# Patient Record
Sex: MALE | Race: WHITE | Employment: OTHER | URBAN - METROPOLITAN AREA
[De-identification: names, ages, dates, MRNs, and addresses within clinical notes are randomized per-mention and may not be internally consistent; named-entity substitution may affect disease eponyms.]

---

## 2017-02-03 ENCOUNTER — HOSPITAL ENCOUNTER (OUTPATIENT)
Dept: VASCULAR SURGERY | Age: 47
Discharge: HOME | End: 2017-02-03
Attending: SPECIALIST
Payer: COMMERCIAL

## 2017-02-03 DIAGNOSIS — Z86.718 HISTORY OF DVT (DEEP VEIN THROMBOSIS): ICD-10-CM

## 2017-02-03 DIAGNOSIS — M79.89 LEG SWELLING: ICD-10-CM

## 2017-02-03 PROCEDURE — 93970 EXTREMITY STUDY: CPT

## 2017-02-03 NOTE — MR AVS SNAPSHOT
Visit Information Date & Time Provider Department Dept. Phone Encounter #  
 2/3/2017  9:00 AM [facility] [address] [id number] Upcoming Health Maintenance Date Due DTaP/Tdap/Td series (1 - Tdap) 5/18/1991 INFLUENZA AGE 9 TO ADULT 8/1/2016 Allergies as of 2/3/2017  Review Complete On: 12/8/2016 By: Cherry Gustafson RN No Known Allergies Current Immunizations  Never Reviewed Name Date Influenza Vaccine (Quad) PF 1/26/2016 12:10 PM  
  
 Not reviewed this visit You Were Diagnosed With   
  
 Codes Comments Leg swelling     ICD-10-CM: M79.89 ICD-9-CM: 729.81 History of DVT (deep vein thrombosis)     ICD-10-CM: T26.329 ICD-9-CM: V12.51 Vitals Smoking Status Never Smoker Your Updated Medication List  
  
ASK your doctor about these medications   
 amLODIPine 5 mg tablet Commonly known as:  Esha Harris Take 5 mg by mouth daily. lisinopril 40 mg tablet Commonly known as:  Ashia Olivarez Take 40 mg by mouth daily. PERCOCET  mg per tablet Generic drug:  oxyCODONE-acetaminophen Take 1 Tab by mouth every six (6) hours as needed for Pain.  
  
 phenylephrine 1 % Spry Commonly known as:  NEOSYNEPHRINE  
2 Sprays by Both Nostrils route every six (6) hours as needed. We Performed the Following DUPLEX LOWER EXT VENOUS BILAT [id number] CPT(R)] Comments:  
 R/o dvt Introducing [address]! Angel Almaraz introduces [employer] patient portal. Now you can access parts of your medical record, email your doctor's office, and request medication refills online. 1. In your internet browser, go to [email or web address] 2. Click on the First Time User? Click Here link in the Sign In box. You will see the New Member Sign Up page. 3. Enter your [employer] Access Code exactly as it appears below.  You will not need to use this code after youve completed the sign-up process. If you do not sign up before the expiration date, you must request a new code. · [employer] Access Code: [email or web address] Expires: 3/5/2017  2:52 PM 
 
4. Enter the last four digits of your Social Security Number (xxxx) and Date of Birth (mm/dd/yyyy) as indicated and click Submit. You will be taken to the next sign-up page. 5. Create a [employer] ID. This will be your [employer] login ID and cannot be changed, so think of one that is secure and easy to remember. 6. Create a [employer] password. You can change your password at any time. 7. Enter your Password Reset Question and Answer. This can be used at a later time if you forget your password. 8. Enter your e-mail address. You will receive e-mail notification when new information is available in [address]. 9. Click Sign Up. You can now view and download portions of your medical record. 10. Click the Download Summary menu link to download a portable copy of your medical information. If you have questions, please visit the Frequently Asked Questions section of the [employer] website. Remember, [employer] is NOT to be used for urgent needs. For medical emergencies, dial 911. Now available from your iPhone and Android! Please provide this summary of care documentation to your next provider. Your primary care clinician is listed as Michel Lawrence. If you have any questions after today's visit, please call [phone].

## 2017-02-03 NOTE — PROCEDURES
[facility], located within [facility]  *** FINAL REPORT ***    Name: Antonio Ghosh  MRN: [id number]    Outpatient  : 18 May 1970  HIS Order #: [id number]  [address] [facility] Visit #: [id number]  Date: 2017    TYPE OF TEST: Peripheral Venous Testing    REASON FOR TEST  Pain in limb    Right Leg:-  Deep venous thrombosis:           Yes  Proximal extent of thrombus:      Posterior Tibial  Superficial venous thrombosis:    No  Deep venous insufficiency:        Not examined  Superficial venous insufficiency: Not examined    Left Leg:-  Deep venous thrombosis:           Yes  Proximal extent of thrombus:      Posterior Tibial  Superficial venous thrombosis:    No  Deep venous insufficiency:        Not examined  Superficial venous insufficiency: Not examined      INTERPRETATION/FINDINGS  Duplex images were obtained using 2-D gray scale, color flow, and  spectral Doppler analysis. Right leg :  1. Deep vein(s) visualized include the common femoral, deep femoral,  proximal femoral, mid femoral, distal femoral, popliteal(above knee),  popliteal(fossa), popliteal(below knee), posterior tibial and peroneal   veins. 2. Non occlusive deep venous thrombosis identified in the mid  posterior tibial vein. 3. No evidence of superficial thrombosis detected. Left leg :  1. Deep vein(s) visualized include the common femoral, deep femoral,  proximal femoral, mid femoral, distal femoral, popliteal(above knee),  popliteal(fossa), popliteal(below knee), posterior tibial and peroneal   veins. 2. Non occlusive deep venous thrombosis identified in one of the  posterior tibial and peroneal veins. 3. No evidence of superficial thrombosis detected. ADDITIONAL COMMENTS  Verbal report given to Dr. Jeff Lebron have personally reviewed the data relevant to the interpretation of  this  study. TECHNOLOGIST: Frankie Breen  Signed: 2017 10:25 AM    PHYSICIAN: Merrill Black MD  Signed: 2017 01:08 PM

## 2023-02-01 ENCOUNTER — HOSPITAL ENCOUNTER (INPATIENT)
Age: 53
LOS: 8 days | Discharge: HOME HEALTH | DRG: 871 | End: 2023-02-09
Attending: EMERGENCY MEDICINE | Admitting: HOSPITALIST
Payer: COMMERCIAL

## 2023-02-01 ENCOUNTER — APPOINTMENT (OUTPATIENT)
Dept: CT IMAGING | Age: 53
DRG: 871 | End: 2023-02-01
Attending: EMERGENCY MEDICINE
Payer: COMMERCIAL

## 2023-02-01 ENCOUNTER — APPOINTMENT (OUTPATIENT)
Dept: GENERAL RADIOLOGY | Age: 53
DRG: 871 | End: 2023-02-01
Attending: EMERGENCY MEDICINE
Payer: COMMERCIAL

## 2023-02-01 DIAGNOSIS — N17.9 AKI (ACUTE KIDNEY INJURY) (HCC): ICD-10-CM

## 2023-02-01 DIAGNOSIS — E11.622 TYPE 2 DIABETES MELLITUS WITH OTHER SKIN ULCER, WITHOUT LONG-TERM CURRENT USE OF INSULIN (HCC): ICD-10-CM

## 2023-02-01 DIAGNOSIS — L03.115 CELLULITIS OF RIGHT LOWER EXTREMITY: Primary | ICD-10-CM

## 2023-02-01 DIAGNOSIS — I95.9 ACUTE HYPOTENSION: ICD-10-CM

## 2023-02-01 DIAGNOSIS — A41.9 SEPSIS, DUE TO UNSPECIFIED ORGANISM, UNSPECIFIED WHETHER ACUTE ORGAN DYSFUNCTION PRESENT (HCC): ICD-10-CM

## 2023-02-01 DIAGNOSIS — I95.9 HYPOTENSION, UNSPECIFIED HYPOTENSION TYPE: ICD-10-CM

## 2023-02-01 PROBLEM — L03.90 CELLULITIS: Status: ACTIVE | Noted: 2023-02-01

## 2023-02-01 PROBLEM — R65.21 SEPTIC SHOCK (HCC): Status: ACTIVE | Noted: 2023-02-01

## 2023-02-01 PROBLEM — G47.33 OSA (OBSTRUCTIVE SLEEP APNEA): Status: ACTIVE | Noted: 2023-02-01

## 2023-02-01 LAB
ALBUMIN SERPL-MCNC: 2.8 G/DL (ref 3.4–5)
ALBUMIN/GLOB SERPL: 0.6 (ref 0.8–1.7)
ALP SERPL-CCNC: 71 U/L (ref 45–117)
ALT SERPL-CCNC: 29 U/L (ref 16–61)
ANION GAP SERPL CALC-SCNC: 10 MMOL/L (ref 3–18)
AST SERPL-CCNC: 12 U/L (ref 10–38)
BASOPHILS # BLD: 0 K/UL (ref 0–0.1)
BASOPHILS NFR BLD: 0 % (ref 0–2)
BILIRUB SERPL-MCNC: 0.8 MG/DL (ref 0.2–1)
BUN SERPL-MCNC: 25 MG/DL (ref 7–18)
BUN/CREAT SERPL: 8 (ref 12–20)
CALCIUM SERPL-MCNC: 9.6 MG/DL (ref 8.5–10.1)
CHLORIDE SERPL-SCNC: 99 MMOL/L (ref 100–111)
CO2 SERPL-SCNC: 27 MMOL/L (ref 21–32)
CREAT SERPL-MCNC: 3.26 MG/DL (ref 0.6–1.3)
DIFFERENTIAL METHOD BLD: ABNORMAL
EOSINOPHIL # BLD: 0.3 K/UL (ref 0–0.4)
EOSINOPHIL NFR BLD: 1 % (ref 0–5)
ERYTHROCYTE [DISTWIDTH] IN BLOOD BY AUTOMATED COUNT: 13.7 % (ref 11.6–14.5)
GLOBULIN SER CALC-MCNC: 4.9 G/DL (ref 2–4)
GLUCOSE BLD STRIP.AUTO-MCNC: 176 MG/DL (ref 74–106)
GLUCOSE SERPL-MCNC: 184 MG/DL (ref 74–99)
HCT VFR BLD AUTO: 46 % (ref 36–48)
HGB BLD-MCNC: 14.8 G/DL (ref 13–16)
IMM GRANULOCYTES # BLD AUTO: 0.8 K/UL (ref 0–0.04)
IMM GRANULOCYTES NFR BLD AUTO: 3 % (ref 0–0.5)
LACTATE BLD-SCNC: 3.44 MMOL/L (ref 0.4–2)
LACTATE SERPL-SCNC: 3.3 MMOL/L (ref 0.4–2)
LYMPHOCYTES # BLD: 0.8 K/UL (ref 0.9–3.6)
LYMPHOCYTES NFR BLD: 3 % (ref 21–52)
MCH RBC QN AUTO: 27.9 PG (ref 24–34)
MCHC RBC AUTO-ENTMCNC: 32.2 G/DL (ref 31–37)
MCV RBC AUTO: 86.8 FL (ref 78–100)
MONOCYTES # BLD: 1.7 K/UL (ref 0.05–1.2)
MONOCYTES NFR BLD: 6 % (ref 3–10)
NEUTS SEG # BLD: 24.5 K/UL (ref 1.8–8)
NEUTS SEG NFR BLD: 87 % (ref 40–73)
NRBC # BLD: 0 K/UL (ref 0–0.01)
NRBC BLD-RTO: 0 PER 100 WBC
PLATELET # BLD AUTO: 245 K/UL (ref 135–420)
PLATELET COMMENTS,PCOM: ABNORMAL
PMV BLD AUTO: 10.6 FL (ref 9.2–11.8)
POTASSIUM SERPL-SCNC: 4.1 MMOL/L (ref 3.5–5.5)
PROT SERPL-MCNC: 7.7 G/DL (ref 6.4–8.2)
RBC # BLD AUTO: 5.3 M/UL (ref 4.35–5.65)
RBC MORPH BLD: ABNORMAL
RBC MORPH BLD: ABNORMAL
RPR SER QL: NONREACTIVE
SERVICE CMNT-IMP: ABNORMAL
SODIUM SERPL-SCNC: 136 MMOL/L (ref 136–145)
TROPONIN I SERPL HS-MCNC: 16 NG/L (ref 0–78)
WBC # BLD AUTO: 28.1 K/UL (ref 4.6–13.2)
WBC MORPH BLD: ABNORMAL

## 2023-02-01 PROCEDURE — P9047 ALBUMIN (HUMAN), 25%, 50ML: HCPCS | Performed by: HOSPITALIST

## 2023-02-01 PROCEDURE — 96365 THER/PROPH/DIAG IV INF INIT: CPT

## 2023-02-01 PROCEDURE — 87205 SMEAR GRAM STAIN: CPT

## 2023-02-01 PROCEDURE — 87040 BLOOD CULTURE FOR BACTERIA: CPT

## 2023-02-01 PROCEDURE — 74011000250 HC RX REV CODE- 250: Performed by: FAMILY MEDICINE

## 2023-02-01 PROCEDURE — 83605 ASSAY OF LACTIC ACID: CPT

## 2023-02-01 PROCEDURE — 74011250636 HC RX REV CODE- 250/636: Performed by: EMERGENCY MEDICINE

## 2023-02-01 PROCEDURE — 86592 SYPHILIS TEST NON-TREP QUAL: CPT

## 2023-02-01 PROCEDURE — 96375 TX/PRO/DX INJ NEW DRUG ADDON: CPT

## 2023-02-01 PROCEDURE — 71045 X-RAY EXAM CHEST 1 VIEW: CPT

## 2023-02-01 PROCEDURE — 65610000006 HC RM INTENSIVE CARE

## 2023-02-01 PROCEDURE — 85025 COMPLETE CBC W/AUTO DIFF WBC: CPT

## 2023-02-01 PROCEDURE — 93005 ELECTROCARDIOGRAM TRACING: CPT

## 2023-02-01 PROCEDURE — 87147 CULTURE TYPE IMMUNOLOGIC: CPT

## 2023-02-01 PROCEDURE — 84484 ASSAY OF TROPONIN QUANT: CPT

## 2023-02-01 PROCEDURE — 73701 CT LOWER EXTREMITY W/DYE: CPT

## 2023-02-01 PROCEDURE — 80053 COMPREHEN METABOLIC PANEL: CPT

## 2023-02-01 PROCEDURE — 82962 GLUCOSE BLOOD TEST: CPT

## 2023-02-01 PROCEDURE — 74011000636 HC RX REV CODE- 636: Performed by: EMERGENCY MEDICINE

## 2023-02-01 PROCEDURE — 74011000258 HC RX REV CODE- 258: Performed by: EMERGENCY MEDICINE

## 2023-02-01 PROCEDURE — 74011250636 HC RX REV CODE- 250/636: Performed by: HOSPITALIST

## 2023-02-01 PROCEDURE — 99285 EMERGENCY DEPT VISIT HI MDM: CPT

## 2023-02-01 RX ORDER — HEPARIN SODIUM 5000 [USP'U]/ML
5000 INJECTION, SOLUTION INTRAVENOUS; SUBCUTANEOUS EVERY 8 HOURS
Status: DISCONTINUED | OUTPATIENT
Start: 2023-02-01 | End: 2023-02-09 | Stop reason: HOSPADM

## 2023-02-01 RX ORDER — NOREPINEPHRINE BIT/0.9 % NACL 16MG/250ML
.5-3 INFUSION BOTTLE (ML) INTRAVENOUS
Status: DISCONTINUED | OUTPATIENT
Start: 2023-02-01 | End: 2023-02-03

## 2023-02-01 RX ORDER — METFORMIN HYDROCHLORIDE 500 MG/1
500 TABLET, EXTENDED RELEASE ORAL
COMMUNITY

## 2023-02-01 RX ORDER — METOPROLOL TARTRATE 100 MG/1
100 TABLET ORAL 2 TIMES DAILY
COMMUNITY

## 2023-02-01 RX ORDER — BUPROPION HYDROCHLORIDE 300 MG/1
300 TABLET ORAL DAILY
COMMUNITY

## 2023-02-01 RX ORDER — SODIUM CHLORIDE 0.9 % (FLUSH) 0.9 %
5-10 SYRINGE (ML) INJECTION AS NEEDED
Status: DISCONTINUED | OUTPATIENT
Start: 2023-02-01 | End: 2023-02-09 | Stop reason: HOSPADM

## 2023-02-01 RX ORDER — SODIUM CHLORIDE 9 MG/ML
125 INJECTION, SOLUTION INTRAVENOUS CONTINUOUS
Status: DISCONTINUED | OUTPATIENT
Start: 2023-02-01 | End: 2023-02-03

## 2023-02-01 RX ORDER — OXYCODONE AND ACETAMINOPHEN 5; 325 MG/1; MG/1
1 TABLET ORAL
Status: DISCONTINUED | OUTPATIENT
Start: 2023-02-01 | End: 2023-02-02

## 2023-02-01 RX ORDER — VERAPAMIL HYDROCHLORIDE 120 MG/1
120 TABLET, FILM COATED ORAL 3 TIMES DAILY
COMMUNITY

## 2023-02-01 RX ORDER — PANTOPRAZOLE SODIUM 40 MG/1
40 TABLET, DELAYED RELEASE ORAL
Status: DISCONTINUED | OUTPATIENT
Start: 2023-02-02 | End: 2023-02-09 | Stop reason: HOSPADM

## 2023-02-01 RX ORDER — EZETIMIBE 10 MG/1
10 TABLET ORAL DAILY
COMMUNITY

## 2023-02-01 RX ORDER — LATANOPROST 50 UG/ML
1 SOLUTION/ DROPS OPHTHALMIC
COMMUNITY

## 2023-02-01 RX ORDER — VANCOMYCIN 2 GRAM/500 ML IN 0.9 % SODIUM CHLORIDE INTRAVENOUS
2000 ONCE
Status: COMPLETED | OUTPATIENT
Start: 2023-02-01 | End: 2023-02-01

## 2023-02-01 RX ORDER — CLINDAMYCIN HYDROCHLORIDE 300 MG/1
300 CAPSULE ORAL 3 TIMES DAILY
COMMUNITY
End: 2023-02-09

## 2023-02-01 RX ORDER — MORPHINE SULFATE 4 MG/ML
4 INJECTION INTRAVENOUS ONCE
Status: COMPLETED | OUTPATIENT
Start: 2023-02-01 | End: 2023-02-01

## 2023-02-01 RX ORDER — ROSUVASTATIN CALCIUM 40 MG/1
40 TABLET, COATED ORAL
COMMUNITY

## 2023-02-01 RX ORDER — ALBUMIN HUMAN 250 G/1000ML
25 SOLUTION INTRAVENOUS EVERY 6 HOURS
Status: COMPLETED | OUTPATIENT
Start: 2023-02-01 | End: 2023-02-03

## 2023-02-01 RX ADMIN — HEPARIN SODIUM 5000 UNITS: 5000 INJECTION INTRAVENOUS; SUBCUTANEOUS at 20:11

## 2023-02-01 RX ADMIN — SODIUM CHLORIDE 1000 ML: 9 INJECTION, SOLUTION INTRAVENOUS at 15:15

## 2023-02-01 RX ADMIN — SODIUM CHLORIDE 125 ML/HR: 9 INJECTION, SOLUTION INTRAVENOUS at 20:11

## 2023-02-01 RX ADMIN — Medication 4 MCG/MIN: at 20:33

## 2023-02-01 RX ADMIN — IOPAMIDOL 100 ML: 612 INJECTION, SOLUTION INTRAVENOUS at 16:59

## 2023-02-01 RX ADMIN — ALBUMIN (HUMAN) 25 G: 0.25 INJECTION, SOLUTION INTRAVENOUS at 23:41

## 2023-02-01 RX ADMIN — SODIUM CHLORIDE 1000 ML: 9 INJECTION, SOLUTION INTRAVENOUS at 15:14

## 2023-02-01 RX ADMIN — MORPHINE SULFATE 4 MG: 4 INJECTION INTRAVENOUS at 16:40

## 2023-02-01 RX ADMIN — CEFTRIAXONE 1 G: 1 INJECTION, POWDER, FOR SOLUTION INTRAMUSCULAR; INTRAVENOUS at 15:23

## 2023-02-01 RX ADMIN — SODIUM CHLORIDE 121 ML: 9 INJECTION, SOLUTION INTRAVENOUS at 15:15

## 2023-02-01 RX ADMIN — ALBUMIN (HUMAN) 25 G: 0.25 INJECTION, SOLUTION INTRAVENOUS at 20:11

## 2023-02-01 RX ADMIN — VANCOMYCIN HYDROCHLORIDE 2000 MG: 10 INJECTION, POWDER, LYOPHILIZED, FOR SOLUTION INTRAVENOUS at 15:24

## 2023-02-01 NOTE — ED TRIAGE NOTES
Patient wheelchaired to ED states that he was sent by his primary care provider. Patient states he has an abscess to the right hip that he believed is infected.

## 2023-02-01 NOTE — PROGRESS NOTES
[address] [facility] [place], [place] Pharmacokinetic Monitoring Service - Vancomycin     Jannette Benavidez is a 46 y.o. male starting on vancomycin therapy for SSTI. Pharmacy consulted by [facility] for monitoring and adjustment. Target Concentration: Goal AUC/TIMOTHY <500 mg*hr/L    Additional Antimicrobials: Ceftriaxone    Pertinent Laboratory Values: Wt Readings from Last 1 Encounters:   02/01/23 149.7 kg (330 lb)     Temp Readings from Last 1 Encounters:   02/01/23 98.1 °F (36.7 °C)     No components found for: PROCAL  Estimated Creatinine Clearance: 38.4 mL/min (A) (based on SCr of 3.26 mg/dL (H)).   Recent Labs     02/01/23  1500   WBC 28.1*     Plan:  Dosing recommendations based on Bayesian software  Start vancomycin 2 g IV x once 2/1/23 1500 then 750 mg IV q24h 2/2/23 1500  Anticipated AUC of 489 mg/L.hr and trough concentration of 15.3 mcg/ml at steady state  Renal labs as indicated   Vancomycin concentration ordered for 2/2/23 @ 0400   Pharmacy will continue to monitor patient and adjust therapy as indicated    Thank you for the consult,  BOWEN Waldrop  2/1/2023 4:32 PM

## 2023-02-01 NOTE — ED PROVIDER NOTES
EMERGENCY DEPARTMENT HISTORY AND PHYSICAL EXAM      Patient Name: Michaela Sosa  MRN: [id number]  YOB: 1970  Provider: Edward Lou MD  PCP: Steff Reed MD   Time/Date of evaluation: 2:47 PM 2/1/23      History of Presenting Illness     Chief Complaint   Patient presents with    Skin Problem       History Provided By: Patient and family      History (Narative):   Michaela Sosa is a 46 y.o. male with a PMHx of diabetes, chronic lumbar pain, sleep apnea, arthritis  who presents to the emergency department (room 6) by POV C/O skin wound/abscess. Patient feels that the abscess/open wound has been slowly improving over the last 3 weeks, however today he is having more difficulty ambulation due to increased swelling and pain. Associated sxs include leg pain. Pt denies any other sxs or complaints. Past History     Past Medical History:  Past Medical History:   Diagnosis Date    Arthritis     spine    Chronic pain     lumbar    GERD (gastroesophageal reflux disease)     Sleep apnea     CPAP in the past, was 300 lbs, lost weight       Past Surgical History:  Past Surgical History:   Procedure Laterality Date    HX ORTHOPAEDIC  1990    right ankle surgery       Family History:  No family history on file. Social History:  Social History     Tobacco Use    Smoking status: Never    Smokeless tobacco: Never   Substance Use Topics    Alcohol use:  Yes     Alcohol/week: 5.0 standard drinks     Types: 5 Shots of liquor per week    Drug use: No       Medications:  Current Facility-Administered Medications   Medication Dose Route Frequency Provider Last Rate Last Admin    sodium chloride (NS) flush 5-10 mL  5-10 mL IntraVENous PRN Belinda Erwin MD        [START ON 2/2/2023] Vancomycin level due with AM labs 2/2/23 0400  1 Each Other ONCE Jackie Crump MD        Vanocmycin - Rx to dose and monitor  1 Each Other Rx Dosing/Monitoring Jackie Crump MD        [START ON 2/2/2023] vancomycin (VANCOCIN) 750 mg in 0.9% sodium chloride 250 mL (VIAL-MATE)  750 mg IntraVENous Q24H Henri Erwin MD        [START ON 2/2/2023] cefTRIAXone (ROCEPHIN) 2 g in 0.9% sodium chloride (MBP/ADV) 50 mL MBP  2 g IntraVENous Q24H Andrea Carias MD        albumin human 25% (BUMINATE) solution 25 g  25 g IntraVENous Q6H Andrea Carias MD   25 g at 02/01/23 2011    0.9% sodium chloride infusion  125 mL/hr IntraVENous CONTINUOUS Sheila SEBASTIAN  mL/hr at 02/01/23 2011 125 mL/hr at 02/01/23 2011    heparin (porcine) injection 5,000 Units  5,000 Units SubCUTAneous Q8H Sheila SEBASTIAN MD   5,000 Units at 02/01/23 2011    [START ON 2/2/2023] pantoprazole (PROTONIX) tablet 40 mg  40 mg Oral ACB Sheila SEBASTIAN MD        NOREPINephrine (LEVOPHED) 16 mg in 0.9% sodium chloride 250 mL infusion  0.5-30 mcg/min IntraVENous TITRATE Zeinab Laguna MD 4.7 mL/hr at 02/01/23 2147 5 mcg/min at 02/01/23 2147    oxyCODONE-acetaminophen (PERCOCET) 5-325 mg per tablet 1 Tablet  1 Tablet Oral Q4H PRN Zeinab Laguna MD           Allergies:  No Known Allergies    Social Determinants of Health:  Social Determinants of Health     Tobacco Use: Not on file   Alcohol Use: Not on file   Financial Resource Strain: Not on file   Food Insecurity: Not on file   Transportation Needs: Not on file   Physical Activity: Not on file   Stress: Not on file   Social Connections: Not on file   Intimate Partner Violence: Not on file   Depression: Not on file   Housing Stability: Not on file       Review of Systems      Review of Systems   Skin:  Positive for color change and wound. Physical Exam     Vitals:    02/01/23 2001 02/01/23 2004 02/01/23 2015 02/01/23 2019   BP: (!) 143/109 (!) 132/99 (!) 78/52 (!) 88/43   Pulse: 69 67 68 66   Resp: 18 19 21 20   Temp:  98.1 °F (36.7 °C)     SpO2: 93% 98% 95% 99%   Weight:       Height:           Physical Exam  Vitals and nursing note reviewed.    Constitutional:       General: He is not in acute distress. Appearance: Normal appearance. He is normal weight. He is not ill-appearing. HENT:      Head: Normocephalic and atraumatic. Nose: Nose normal. No rhinorrhea. Mouth/Throat:      Mouth: Mucous membranes are moist.      Pharynx: No oropharyngeal exudate or posterior oropharyngeal erythema. Eyes:      Extraocular Movements: Extraocular movements intact. Conjunctiva/sclera: Conjunctivae normal.      Pupils: Pupils are equal, round, and reactive to light. Cardiovascular:      Rate and Rhythm: Regular rhythm. Bradycardia present. Heart sounds: No murmur heard. No friction rub. No gallop. Pulmonary:      Effort: Pulmonary effort is normal. No respiratory distress. Breath sounds: Normal breath sounds. No wheezing, rhonchi or rales. Abdominal:      General: Bowel sounds are normal.      Palpations: Abdomen is soft. Tenderness: There is no abdominal tenderness. There is no guarding or rebound. Musculoskeletal:         General: No swelling, tenderness or deformity. Normal range of motion. Cervical back: Normal range of motion and neck supple. No rigidity. Lymphadenopathy:      Cervical: No cervical adenopathy. Skin:     General: Skin is warm and dry. Findings: Abscess and erythema present. No rash. Neurological:      General: No focal deficit present. Mental Status: He is alert and oriented to person, place, and time.    Psychiatric:         Mood and Affect: Mood normal.         Behavior: Behavior normal.       Diagnostic Study Results     Labs:  Recent Results (from the past 12 hour(s))   TROPONIN-HIGH SENSITIVITY    Collection Time: 02/01/23  3:00 PM   Result Value Ref Range    Troponin-High Sensitivity 16 0 - 78 ng/L   METABOLIC PANEL, COMPREHENSIVE    Collection Time: 02/01/23  3:00 PM   Result Value Ref Range    Sodium 136 136 - 145 mmol/L    Potassium 4.1 3.5 - 5.5 mmol/L    Chloride 99 (L) 100 - 111 mmol/L    CO2 27 21 - 32 mmol/L    Anion gap 10 3.0 - 18 mmol/L    Glucose 184 (H) 74 - 99 mg/dL    BUN 25 (H) 7.0 - 18 MG/DL    Creatinine 3.26 (H) 0.6 - 1.3 MG/DL    BUN/Creatinine ratio 8 (L) 12 - 20      eGFR 22 (L) >60 ml/min/1.73m2    Calcium 9.6 8.5 - 10.1 MG/DL    Bilirubin, total 0.8 0.2 - 1.0 MG/DL    ALT (SGPT) 29 16 - 61 U/L    AST (SGOT) 12 10 - 38 U/L    Alk. phosphatase 71 45 - 117 U/L    Protein, total 7.7 6.4 - 8.2 g/dL    Albumin 2.8 (L) 3.4 - 5.0 g/dL    Globulin 4.9 (H) 2.0 - 4.0 g/dL    A-G Ratio 0.6 (L) 0.8 - 1.7     CBC WITH AUTOMATED DIFF    Collection Time: 02/01/23  3:00 PM   Result Value Ref Range    WBC 28.1 (H) 4.6 - 13.2 K/uL    RBC 5.30 4.35 - 5.65 M/uL    HGB 14.8 13.0 - 16.0 g/dL    HCT 46.0 36.0 - 48.0 %    MCV 86.8 78.0 - 100.0 FL    MCH 27.9 24.0 - 34.0 PG    MCHC 32.2 31.0 - 37.0 g/dL    RDW 13.7 11.6 - 14.5 %    PLATELET 376 361 - 654 K/uL    MPV 10.6 9.2 - 11.8 FL    NRBC 0.0 0  WBC    ABSOLUTE NRBC 0.00 0.00 - 0.01 K/uL    NEUTROPHILS 87 (H) 40 - 73 %    LYMPHOCYTES 3 (L) 21 - 52 %    MONOCYTES 6 3 - 10 %    EOSINOPHILS 1 0 - 5 %    BASOPHILS 0 0 - 2 %    IMMATURE GRANULOCYTES 3 (H) 0.0 - 0.5 %    ABS. NEUTROPHILS 24.5 (H) 1.8 - 8.0 K/UL    ABS. LYMPHOCYTES 0.8 (L) 0.9 - 3.6 K/UL    ABS. MONOCYTES 1.7 (H) 0.05 - 1.2 K/UL    ABS. EOSINOPHILS 0.3 0.0 - 0.4 K/UL    ABS. BASOPHILS 0.0 0.0 - 0.1 K/UL    ABS. IMM.  GRANS. 0.8 (H) 0.00 - 0.04 K/UL    DF AUTOMATED      PLATELET COMMENTS ADEQUATE PLATELETS      RBC COMMENTS POLYCHROMASIA  1+        RBC COMMENTS        OVALOCYTES  FEW  SCHISTOCYTES  FEW  ODILIA CELLS  FEW      WBC COMMENTS VACUOLATED POLYS     LACTIC ACID    Collection Time: 02/01/23  3:00 PM   Result Value Ref Range    Lactic acid 3.3 (HH) 0.4 - 2.0 MMOL/L   RPR    Collection Time: 02/01/23  3:00 PM   Result Value Ref Range    RPR NONREACTIVE NR     POC LACTIC ACID    Collection Time: 02/01/23  3:01 PM   Result Value Ref Range    Lactic Acid (POC) 3.44 (HH) 0.40 - 2.00 mmol/L   EKG, 12 LEAD, INITIAL    Collection Time: 02/01/23  3:21 PM   Result Value Ref Range    Ventricular Rate 56 BPM    QRS Duration 86 ms    Q-T Interval 404 ms    QTC Calculation (Bezet) 389 ms    Calculated R Axis 25 degrees    Calculated T Axis 11 degrees    Diagnosis       Junctional rhythm with retrograde conduction  Abnormal ECG  When compared with ECG of 20-JAN-2016 10:15,  Junctional rhythm has replaced Sinus rhythm  T wave inversion now evident in Inferior leads  Nonspecific T wave abnormality now evident in Anterior leads     GLUCOSE, POC    Collection Time: 02/01/23  6:47 PM   Result Value Ref Range    Glucose,  (H) 74 - 106 MG/DL    Performed by Meredith Cruz        Radiologic Studies:   CT HIP RT W CONT   Final Result      Right thigh cellulitis. Ill-defined phlegmon in the proximal, anterior right   thigh involves the right sartorius muscle. No drainable abscess at this time. No   fracture or osteomyelitis. XR CHEST PORT   Final Result   Cardiomegaly. No focal pulmonary process. DUPLEX LOWER EXT VENOUS RIGHT    (Results Pending)   US ABD COMP    (Results Pending)     CT Results  (Last 48 hours)                 02/01/23 1710  CT HIP RT W CONT Final result    Impression:      Right thigh cellulitis. Ill-defined phlegmon in the proximal, anterior right   thigh involves the right sartorius muscle. No drainable abscess at this time. No   fracture or osteomyelitis. Narrative:  EXAM: CT HIP RT W CONT       INDICATION: Right hip swelling, possible abscess. COMPARISON: None       TECHNIQUE: Helical CT of the right hip with coronal and sagittal reformats. Images reviewed in soft tissue and bone windows. CT dose reduction was achieved   through the use of a standardized protocol tailored for this examination and   automatic exposure control for dose modulation. CONTRAST: Post IV contrast 100 mL Isovue-300       FINDINGS: Bones: Normal bone mineralization.  No acute fracture, dislocation,   suspicious bone lesion, or osteomyelitis. Hardware: Lumbar spinal hardware and postsurgical findings are partially imaged. Severe right foraminal stenosis at L5-S1. Joint fluid: None. Articulations: Mild right hip osteoarthritis. No evidence of septic arthritis. Tendons: No full-thickness tendon tear. Muscles: Mild atrophy of the right gluteus cheryl muscle. Inflammation involves   the right sartorius muscle. Soft tissue mass: No mass. Multiple reactive right inguinal lymph nodes. No   drainable fluid collection. Skin thickening and stranding in the subcutaneous   adipose tissues involve the right sartorius muscle. Heterogeneous increased   attenuation in the fat lateral to the sartorius muscle measures 7.9 x 8.3 x 4.0   cm. CXR Results  (Last 48 hours)                 02/01/23 1646  XR CHEST PORT Final result    Impression:  Cardiomegaly. No focal pulmonary process. Narrative:  INDICATION:  meets SIRS criteria       COMPARISON: None       FINDINGS: Single AP portable view of the chest obtained at 1627 demonstrates an   enlarged cardiac silhouette. The lungs are hypoinspiratory but clear   bilaterally. No osseous abnormalities are seen. Procedures     Procedures    ED Course     2:47 PM I Kaushal Hu MD) am the first provider for this patient. Initial assessment performed. I reviewed the vital signs, available nursing notes, past medical history, past surgical history, family history and social history. The patients presenting problems have been discussed, and they are in agreement with the care plan formulated and outlined with them. I have encouraged them to ask questions as they arise throughout their visit.     Records Reviewed: Nursing Notes    Cardiac Monitor:  Rate: 58 bpm  Rhythm: Bradycardic Rhythm    Pulse Oximetry Analysis - 91% on RA    Is this patient to be included in the SEP-1 core measure due to severe sepsis or septic shock? YesSEP-1 CORE MEASURE DATA      Sepsis Criteria   Severe Sepsis Criteria   Septic Shock Criteria       Must meet 2:    []Temp >100.9 F (38.3 C) or < 96.8 F (36 C)  []HR > 90  []RR > 20  [x]WBC > 12 or < 4 or 10% bands    AND:    [x] Infection Confirmed or Suspected. Must meet 1:    [x]Lactate > 2       or   [x]Signs of Organ Dysfunction:    - SBP < 90 or MAP < 65  -Creatinine > 2 or increased from baseline  -Urine Output < 0.5 ml/kg/hr  -Bilirubin > 2  -INR > 1.5 (not anticoagulated)  -Platelets < 842,761  -Acute Respiratory Failure as evidenced by new need for NIPPV or mechanical ventilation   Must meet 1:    []Lactate > 4        or   [x]SBP < 90 or MAP < 65 for at least two readings in the first hour after fluid bolus administration    []Vasopressors initiated (if hypotension persists after fluid resuscitation)   Patient Vitals for the past 6 hrs:   Level of Consciousness Temp Temp Source Pulse Resp BP MAP (Monitor) MAP (Calculated) SpO2   02/01/23 1438 0 -- -- (!) 58 18 (!) 77/64 -- 68 100 %   02/01/23 1457 -- -- -- -- -- (!) 100/90 95 93 99 %   02/01/23 1459 -- 98.1 °F (36.7 °C) Oral -- -- -- -- -- --   02/01/23 1502 -- -- -- -- -- 94/74 82 81 91 %      No results for input(s): WBC, LACT, INR, PLT, INREXT, PLTEXT in the last 72 hours. No lab exists for component: CREATININE, BILITOT     Date/Time Sepsis Identified: 3:05 PM, 1 February 2023    Fluid Resuscitation Rationale: at least 30mL/kg based on ideal body weight due to obesity defined as BMI >30 (patient's BMI is Body mass index is 48.73 kg/m².  and IBW is Ideal body weight: 70.7 kg (155 lb 13.8 oz)  Adjusted ideal body weight: 102.3 kg (225 lb 8.3 oz))    Repeat lactate level: ordered and pending at this time    Reassessment Exam: I have reassessed tissue perfusion and hemodynamic status after fluid bolus at this date/time: 6:55 PM on 1 February 2023    [address] [facility] ED:  Medications   sodium chloride (NS) flush 5-10 mL (has no administration in time range)   Vancomycin level due with AM labs 2/2/23 0400 (has no administration in time range)   Vanocmycin - Rx to dose and monitor (has no administration in time range)   vancomycin (VANCOCIN) 750 mg in 0.9% sodium chloride 250 mL (VIAL-MATE) (has no administration in time range)   cefTRIAXone (ROCEPHIN) 2 g in 0.9% sodium chloride (MBP/ADV) 50 mL MBP (has no administration in time range)   albumin human 25% (BUMINATE) solution 25 g (25 g IntraVENous New Bag 2/1/23 2011)   0.9% sodium chloride infusion (125 mL/hr IntraVENous New Bag 2/1/23 2011)   heparin (porcine) injection 5,000 Units (5,000 Units SubCUTAneous Given 2/1/23 2011)   pantoprazole (PROTONIX) tablet 40 mg (has no administration in time range)   NOREPINephrine (LEVOPHED) 16 mg in 0.9% sodium chloride 250 mL infusion (5 mcg/min IntraVENous Rate Change 2/1/23 2147)   oxyCODONE-acetaminophen (PERCOCET) 5-325 mg per tablet 1 Tablet (has no administration in time range)   sodium chloride 0.9 % bolus infusion 1,000 mL (1,000 mL IntraVENous New Bag 2/1/23 1514)     Followed by   sodium chloride 0.9 % bolus infusion 1,000 mL (1,000 mL IntraVENous New Bag 2/1/23 1515)     Followed by   sodium chloride 0.9 % bolus infusion 121 mL (121 mL IntraVENous New Bag 2/1/23 1515)   vancomycin (VANCOCIN) 2000 mg in  ml infusion (2,000 mg IntraVENous New Bag 2/1/23 1524)   morphine injection 4 mg (4 mg IntraVENous Given 2/1/23 1640)   iopamidoL (ISOVUE 300) 300 mg iodine /mL (61 %) contrast injection 100 mL (100 mL IntraVENous Given 2/1/23 1659)       ED Course as of 02/01/23 2205 Wed Feb 01, 2023   1521 EKG interpretation:  Rhythm: Junctional rhythm. Rate: 56 bpm; no STEMI  EKG read by Reid Clifton MD   [JM]   6817 CONSULT NOTE:   Reid Clifton MD spoke with Dr. Giselle Lopez, intensivist who agrees with admission to ICU. [JM]   0088 CONSULT NOTE:   Reid Clifton MD spoke with Dr. Aga Gonzalez for ICU admission.   He requests duplex of the RLE. [JM]      ED Course User Index  [JM] Elenore Hammans, MD       Medical Decision Making     DDX: Cellulitis, abscess, sepsis    DISCUSSION:  This appears to be a severe conditon. This appears to be an acute condition. 46 y.o. male with 3 weeks of open sore on right thigh which seems to be improving however over the last several days he has had increased pain and induration of the skin consistent with a cellulitis. In evaluation of the above differential diagnosis, consideration was given to the following tests and treatments. Patient arrived and having concern for sepsis based on hypotension and suspected source of infection with right hip cellulitis. Sepsis protocol was immediately ordered including 30 bag per cake fluid bolus based on ideal body weight due to elevated BMI. Antibiotics were ordered immediately for skin and soft tissue infection pathway. Repeat lactic acid was ordered dizziness initial lactic acid was elevated. Initial glucose was collected and was moderately elevated at 176. Troponin was within normal limits. Creatinine was elevated at 3.26 but it is unknown whether this patient's last creatinine was, although this likely represents an VIKTOR as her distinguished if he has had a history of renal failure in the past.  White blood cell count was elevated markedly to 28,000 with left shift 87% which confirms suspicion of sepsis. CT scan of his hip was ordered and showed a right thigh cellulitis without drainable fluid collection. A culture of the wound was also sent. Patient had some penile lesions which did not seem to be concerning to him but RPR was sent to evaluate for potential syphilis. Chest x-ray was ordered and did not demonstrate any acute process. Due to the patient's persistent hypotension decision was made to admit him to the ICU as he is likely not suitable for telemetry floor.   Discussed with the hospitalist and the intensivist.  Midline was placed in the ED. Pressors were not started in the ED as the patient's MAP was greater than 65. The decision to perform testing and results were discussed with the patient and family. I discussed each of these tests and considerations with the patient and family. The patient and family agrees with the plan of admission. Additional Considerations: The following Chronic Conditions impacted the patient's care: Diabetes because high risk for infection or severe infection. Critical Care Time:     I have spent 99 minutes of critical care time involved in lab review, consultations with specialist, family decision-making, and documentation. This time does not include time spent on separately billable procedures. During this entire length of time, I was immediately available to the patient. Critical Care: The reason for providing this level of medical care for this critically ill patient was due a critical illness that impaired one or more vital organ systems such that there was a high probability of imminent or life-threatening deterioration in the patient's condition. This care involved high complexity decision making to assess, manipulate, and support vital system functions, to treat this degreee vital organ system failure and to prevent further life-threatening deterioration of the patients condition. Dirk Robert MD    Diagnosis and Disposition     Critical Care Time: 99 minutes    Core Measures:  For Hospitalized Patients:    1. Hospitalization Decision Time:  The decision to hospitalize the patient was made by Dirk Robert MD at 3:05 PM on 2/1/2023    2. Aspirin: Aspirin was not given because the patient did not present with a stroke at the time of their Emergency Department evaluation    6:30 PM Patient is being admitted to the hospital by MARTIN Eid. The results of their tests and reasons for their admission have been discussed with them and/or available family.  They convey agreement and understanding for the need to be admitted and for their admission diagnosis. CONDITIONS ON ADMISSION:  Sepsis is present at the time of admission. Deep Vein Thrombosis is not present at the time of admission. Thrombosis is not present at the time of admission. Urinary Tract Infection is not present at the time of admission. Pneumonia is not present at the time of admission. MRSA is not present at the time of admission. Wound infection is not present at the time of admission. Pressure Ulcer is not present at the time of admission. CLINICAL IMPRESSION:    1. Cellulitis of right lower extremity    2. Sepsis, due to unspecified organism, unspecified whether acute organ dysfunction present ([address]    3. Hypotension, unspecified hypotension type    4. VIKTOR (acute kidney injury) ([address]    5. Type 2 diabetes mellitus with other skin ulcer, without long-term current use of insulin ([address]        PLAN:  Admit to ICU    I Feilcity Bravo MD am the primary clinician of record. Dragon Disclaimer     Please note that this dictation was completed with Emair, the computer voice recognition software. Quite often unanticipated grammatical, syntax, homophones, and other interpretive errors are inadvertently transcribed by the computer software. Please disregard these errors. Please excuse any errors that have escaped final proofreading.     Felicity Bravo MD

## 2023-02-01 NOTE — Clinical Note
Status[de-identified] INPATIENT [101]   Type of Bed: Intensive Care [6]   Cardiac Monitoring Required?: Yes   Inpatient Hospitalization Certified Necessary for the Following Reasons: 4.  Patient requires ICU level of care interventions (further clarification in H&P documentation)   Admitting Diagnosis: Sepsis [facility]) [[id number]]   Admitting Diagnosis: Cellulitis [[id number]]   Admitting Diagnosis: Hypotension [[id number]]   Admitting Diagnosis: VIKTOR (acute kidney injury) [facility]) [[id number]]   Admitting Physician: Gaudencio Franklin [[id number]]   Attending Physician: Gaudencio Franklin [[id number]]   Estimated Length of Stay: 2 Midnights   Discharge Plan[de-identified] Home with Office Follow-up

## 2023-02-02 ENCOUNTER — APPOINTMENT (OUTPATIENT)
Dept: NON INVASIVE DIAGNOSTICS | Age: 53
DRG: 871 | End: 2023-02-02
Attending: INTERNAL MEDICINE
Payer: COMMERCIAL

## 2023-02-02 ENCOUNTER — APPOINTMENT (OUTPATIENT)
Dept: VASCULAR SURGERY | Age: 53
DRG: 871 | End: 2023-02-02
Attending: EMERGENCY MEDICINE
Payer: COMMERCIAL

## 2023-02-02 ENCOUNTER — APPOINTMENT (OUTPATIENT)
Dept: VASCULAR SURGERY | Age: 53
DRG: 871 | End: 2023-02-02
Attending: INTERNAL MEDICINE
Payer: COMMERCIAL

## 2023-02-02 ENCOUNTER — APPOINTMENT (OUTPATIENT)
Dept: ULTRASOUND IMAGING | Age: 53
DRG: 871 | End: 2023-02-02
Attending: INTERNAL MEDICINE
Payer: COMMERCIAL

## 2023-02-02 PROBLEM — E87.20 LACTIC ACIDOSIS: Status: ACTIVE | Noted: 2023-02-02

## 2023-02-02 PROBLEM — E11.9 CONTROLLED TYPE 2 DIABETES MELLITUS, WITHOUT LONG-TERM CURRENT USE OF INSULIN (HCC): Status: ACTIVE | Noted: 2023-02-02

## 2023-02-02 LAB
ANION GAP SERPL CALC-SCNC: 9 MMOL/L (ref 3–18)
BUN SERPL-MCNC: 32 MG/DL (ref 7–18)
BUN/CREAT SERPL: 10 (ref 12–20)
CALCIUM SERPL-MCNC: 8.2 MG/DL (ref 8.5–10.1)
CHLORIDE SERPL-SCNC: 103 MMOL/L (ref 100–111)
CO2 SERPL-SCNC: 23 MMOL/L (ref 21–32)
CREAT SERPL-MCNC: 3.33 MG/DL (ref 0.6–1.3)
CREAT UR-MCNC: 55 MG/DL (ref 30–125)
ECHO AO ROOT DIAM: 3.4 CM
ECHO AO ROOT INDEX: 1.33 CM/M2
ECHO AV AREA PEAK VELOCITY: 2.4 CM2
ECHO AV AREA VTI: 2.8 CM2
ECHO AV AREA/BSA PEAK VELOCITY: 0.9 CM2/M2
ECHO AV AREA/BSA VTI: 1.1 CM2/M2
ECHO AV MEAN GRADIENT: 7 MMHG
ECHO AV MEAN VELOCITY: 1.3 M/S
ECHO AV PEAK GRADIENT: 14 MMHG
ECHO AV PEAK VELOCITY: 1.9 M/S
ECHO AV VELOCITY RATIO: 0.63
ECHO AV VTI: 30.5 CM
ECHO LA DIAMETER INDEX: 1.68 CM/M2
ECHO LA DIAMETER: 4.3 CM
ECHO LA TO AORTIC ROOT RATIO: 1.26
ECHO LA VOL 2C: 57 ML (ref 18–58)
ECHO LA VOL 4C: 73 ML (ref 18–58)
ECHO LA VOL BP: 68 ML (ref 18–58)
ECHO LA VOL/BSA BIPLANE: 27 ML/M2 (ref 16–34)
ECHO LA VOLUME AREA LENGTH: 76 ML
ECHO LA VOLUME INDEX A2C: 22 ML/M2 (ref 16–34)
ECHO LA VOLUME INDEX A4C: 29 ML/M2 (ref 16–34)
ECHO LA VOLUME INDEX AREA LENGTH: 30 ML/M2 (ref 16–34)
ECHO LV E' LATERAL VELOCITY: 14 CM/S
ECHO LV E' SEPTAL VELOCITY: 9 CM/S
ECHO LV EDV A2C: 114 ML
ECHO LV EDV A4C: 156 ML
ECHO LV EDV BP: 134 ML (ref 67–155)
ECHO LV EDV INDEX A4C: 61 ML/M2
ECHO LV EDV INDEX BP: 52 ML/M2
ECHO LV EDV NDEX A2C: 45 ML/M2
ECHO LV EJECTION FRACTION A2C: 63 %
ECHO LV EJECTION FRACTION A4C: 55 %
ECHO LV EJECTION FRACTION BIPLANE: 58 % (ref 55–100)
ECHO LV ESV A2C: 42 ML
ECHO LV ESV A4C: 70 ML
ECHO LV ESV BP: 57 ML (ref 22–58)
ECHO LV ESV INDEX A2C: 16 ML/M2
ECHO LV ESV INDEX A4C: 27 ML/M2
ECHO LV ESV INDEX BP: 22 ML/M2
ECHO LV FRACTIONAL SHORTENING: 30 % (ref 28–44)
ECHO LV INTERNAL DIMENSION DIASTOLE INDEX: 2.07 CM/M2
ECHO LV INTERNAL DIMENSION DIASTOLIC: 5.3 CM (ref 4.2–5.9)
ECHO LV INTERNAL DIMENSION SYSTOLIC INDEX: 1.45 CM/M2
ECHO LV INTERNAL DIMENSION SYSTOLIC: 3.7 CM
ECHO LV IVSD: 1.5 CM (ref 0.6–1)
ECHO LV MASS 2D: 352.5 G (ref 88–224)
ECHO LV MASS INDEX 2D: 137.7 G/M2 (ref 49–115)
ECHO LV POSTERIOR WALL DIASTOLIC: 1.5 CM (ref 0.6–1)
ECHO LV RELATIVE WALL THICKNESS RATIO: 0.57
ECHO LVOT AREA: 3.8 CM2
ECHO LVOT AV VTI INDEX: 0.73
ECHO LVOT DIAM: 2.2 CM
ECHO LVOT MEAN GRADIENT: 3 MMHG
ECHO LVOT PEAK GRADIENT: 5 MMHG
ECHO LVOT PEAK VELOCITY: 1.2 M/S
ECHO LVOT STROKE VOLUME INDEX: 32.9 ML/M2
ECHO LVOT SV: 84.3 ML
ECHO LVOT VTI: 22.2 CM
ECHO MV A VELOCITY: 0.77 M/S
ECHO MV E DECELERATION TIME (DT): 151.4 MS
ECHO MV E VELOCITY: 0.95 M/S
ECHO MV E/A RATIO: 1.23
ECHO MV E/E' LATERAL: 6.79
ECHO MV E/E' RATIO (AVERAGED): 8.67
ECHO MV E/E' SEPTAL: 10.56
ECHO PV MAX VELOCITY: 1.1 M/S
ECHO PV PEAK GRADIENT: 4 MMHG
ECHO RV FREE WALL PEAK S': 24 CM/S
ECHO RV INTERNAL DIMENSION: 3.6 CM
ECHO RV TAPSE: 2.9 CM (ref 1.7–?)
ERYTHROCYTE [DISTWIDTH] IN BLOOD BY AUTOMATED COUNT: 13.9 % (ref 11.6–14.5)
EST. AVERAGE GLUCOSE BLD GHB EST-MCNC: 143 MG/DL
GLUCOSE BLD STRIP.AUTO-MCNC: 154 MG/DL (ref 70–110)
GLUCOSE BLD STRIP.AUTO-MCNC: 167 MG/DL (ref 70–110)
GLUCOSE BLD STRIP.AUTO-MCNC: 201 MG/DL (ref 70–110)
GLUCOSE SERPL-MCNC: 140 MG/DL (ref 74–99)
HBA1C MFR BLD: 6.6 % (ref 4.2–5.6)
HCT VFR BLD AUTO: 40.8 % (ref 36–48)
HGB BLD-MCNC: 12.7 G/DL (ref 13–16)
LACTATE SERPL-SCNC: 1.5 MMOL/L (ref 0.4–2)
MCH RBC QN AUTO: 27.5 PG (ref 24–34)
MCHC RBC AUTO-ENTMCNC: 31.1 G/DL (ref 31–37)
MCV RBC AUTO: 88.3 FL (ref 78–100)
NRBC # BLD: 0 K/UL (ref 0–0.01)
NRBC BLD-RTO: 0 PER 100 WBC
PLATELET # BLD AUTO: 194 K/UL (ref 135–420)
PMV BLD AUTO: 11.3 FL (ref 9.2–11.8)
POTASSIUM SERPL-SCNC: 3.9 MMOL/L (ref 3.5–5.5)
RBC # BLD AUTO: 4.62 M/UL (ref 4.35–5.65)
SODIUM SERPL-SCNC: 135 MMOL/L (ref 136–145)
SODIUM UR-SCNC: 90 MMOL/L (ref 20–110)
VANCOMYCIN SERPL-MCNC: 16.5 UG/ML (ref 5–40)
WBC # BLD AUTO: 21.7 K/UL (ref 4.6–13.2)

## 2023-02-02 PROCEDURE — 36415 COLL VENOUS BLD VENIPUNCTURE: CPT

## 2023-02-02 PROCEDURE — 74011250636 HC RX REV CODE- 250/636: Performed by: INTERNAL MEDICINE

## 2023-02-02 PROCEDURE — 80202 ASSAY OF VANCOMYCIN: CPT

## 2023-02-02 PROCEDURE — 83036 HEMOGLOBIN GLYCOSYLATED A1C: CPT

## 2023-02-02 PROCEDURE — 74011250637 HC RX REV CODE- 250/637: Performed by: FAMILY MEDICINE

## 2023-02-02 PROCEDURE — 74011636637 HC RX REV CODE- 636/637: Performed by: INTERNAL MEDICINE

## 2023-02-02 PROCEDURE — 74011000250 HC RX REV CODE- 250: Performed by: INTERNAL MEDICINE

## 2023-02-02 PROCEDURE — 76770 US EXAM ABDO BACK WALL COMP: CPT

## 2023-02-02 PROCEDURE — 65610000006 HC RM INTENSIVE CARE

## 2023-02-02 PROCEDURE — 74011250636 HC RX REV CODE- 250/636: Performed by: EMERGENCY MEDICINE

## 2023-02-02 PROCEDURE — 74011250637 HC RX REV CODE- 250/637: Performed by: HOSPITALIST

## 2023-02-02 PROCEDURE — 80048 BASIC METABOLIC PNL TOTAL CA: CPT

## 2023-02-02 PROCEDURE — 82570 ASSAY OF URINE CREATININE: CPT

## 2023-02-02 PROCEDURE — 74011000258 HC RX REV CODE- 258: Performed by: HOSPITALIST

## 2023-02-02 PROCEDURE — 74011000250 HC RX REV CODE- 250: Performed by: FAMILY MEDICINE

## 2023-02-02 PROCEDURE — 93306 TTE W/DOPPLER COMPLETE: CPT

## 2023-02-02 PROCEDURE — 83605 ASSAY OF LACTIC ACID: CPT

## 2023-02-02 PROCEDURE — 82962 GLUCOSE BLOOD TEST: CPT

## 2023-02-02 PROCEDURE — 84300 ASSAY OF URINE SODIUM: CPT

## 2023-02-02 PROCEDURE — 74011250636 HC RX REV CODE- 250/636: Performed by: HOSPITALIST

## 2023-02-02 PROCEDURE — 74011000258 HC RX REV CODE- 258: Performed by: INTERNAL MEDICINE

## 2023-02-02 PROCEDURE — P9047 ALBUMIN (HUMAN), 25%, 50ML: HCPCS | Performed by: HOSPITALIST

## 2023-02-02 PROCEDURE — 85027 COMPLETE CBC AUTOMATED: CPT

## 2023-02-02 PROCEDURE — 93971 EXTREMITY STUDY: CPT

## 2023-02-02 RX ORDER — SILDENAFIL 100 MG/1
100 TABLET, FILM COATED ORAL 2 TIMES DAILY
COMMUNITY

## 2023-02-02 RX ORDER — DORZOLAMIDE HYDROCHLORIDE AND TIMOLOL MALEATE 20; 5 MG/ML; MG/ML
1 SOLUTION/ DROPS OPHTHALMIC 2 TIMES DAILY
Status: DISCONTINUED | OUTPATIENT
Start: 2023-02-02 | End: 2023-02-09 | Stop reason: HOSPADM

## 2023-02-02 RX ORDER — OXYCODONE HYDROCHLORIDE 5 MG/1
5 TABLET ORAL
Status: DISCONTINUED | OUTPATIENT
Start: 2023-02-02 | End: 2023-02-04

## 2023-02-02 RX ORDER — DEXTROSE MONOHYDRATE 100 MG/ML
0-250 INJECTION, SOLUTION INTRAVENOUS AS NEEDED
Status: DISCONTINUED | OUTPATIENT
Start: 2023-02-02 | End: 2023-02-09 | Stop reason: HOSPADM

## 2023-02-02 RX ORDER — INSULIN LISPRO 100 [IU]/ML
INJECTION, SOLUTION INTRAVENOUS; SUBCUTANEOUS
Status: DISCONTINUED | OUTPATIENT
Start: 2023-02-02 | End: 2023-02-09 | Stop reason: HOSPADM

## 2023-02-02 RX ORDER — IBUPROFEN 200 MG
4 TABLET ORAL AS NEEDED
Status: DISCONTINUED | OUTPATIENT
Start: 2023-02-02 | End: 2023-02-09 | Stop reason: HOSPADM

## 2023-02-02 RX ORDER — HYDROMORPHONE HYDROCHLORIDE 1 MG/ML
1 INJECTION, SOLUTION INTRAMUSCULAR; INTRAVENOUS; SUBCUTANEOUS
Status: DISPENSED | OUTPATIENT
Start: 2023-02-02 | End: 2023-02-04

## 2023-02-02 RX ORDER — ACETAMINOPHEN 325 MG/1
650 TABLET ORAL
Status: DISCONTINUED | OUTPATIENT
Start: 2023-02-02 | End: 2023-02-09 | Stop reason: HOSPADM

## 2023-02-02 RX ADMIN — HEPARIN SODIUM 5000 UNITS: 5000 INJECTION INTRAVENOUS; SUBCUTANEOUS at 03:47

## 2023-02-02 RX ADMIN — Medication 2 UNITS: at 11:33

## 2023-02-02 RX ADMIN — PANTOPRAZOLE SODIUM 40 MG: 40 TABLET, DELAYED RELEASE ORAL at 07:57

## 2023-02-02 RX ADMIN — HYDROMORPHONE HYDROCHLORIDE 1 MG: 1 INJECTION, SOLUTION INTRAMUSCULAR; INTRAVENOUS; SUBCUTANEOUS at 17:25

## 2023-02-02 RX ADMIN — ACETAMINOPHEN 650 MG: 325 TABLET ORAL at 01:49

## 2023-02-02 RX ADMIN — Medication 2 UNITS: at 21:54

## 2023-02-02 RX ADMIN — VANCOMYCIN HYDROCHLORIDE 750 MG: 750 INJECTION, POWDER, LYOPHILIZED, FOR SOLUTION INTRAVENOUS at 15:50

## 2023-02-02 RX ADMIN — DORZOLAMIDE HYDROCHLORIDE AND TIMOLOL MALEATE 1 DROP: 20; 5 SOLUTION/ DROPS OPHTHALMIC at 11:11

## 2023-02-02 RX ADMIN — HYDROMORPHONE HYDROCHLORIDE 1 MG: 1 INJECTION, SOLUTION INTRAMUSCULAR; INTRAVENOUS; SUBCUTANEOUS at 13:46

## 2023-02-02 RX ADMIN — HEPARIN SODIUM 5000 UNITS: 5000 INJECTION INTRAVENOUS; SUBCUTANEOUS at 11:33

## 2023-02-02 RX ADMIN — HYDROMORPHONE HYDROCHLORIDE 1 MG: 1 INJECTION, SOLUTION INTRAMUSCULAR; INTRAVENOUS; SUBCUTANEOUS at 09:40

## 2023-02-02 RX ADMIN — ACETAMINOPHEN 650 MG: 325 TABLET ORAL at 16:33

## 2023-02-02 RX ADMIN — PIPERACILLIN AND TAZOBACTAM 3.38 G: 3; .375 INJECTION, POWDER, LYOPHILIZED, FOR SOLUTION INTRAVENOUS at 12:51

## 2023-02-02 RX ADMIN — SODIUM CHLORIDE 125 ML/HR: 9 INJECTION, SOLUTION INTRAVENOUS at 04:28

## 2023-02-02 RX ADMIN — Medication 4 UNITS: at 16:33

## 2023-02-02 RX ADMIN — DORZOLAMIDE HYDROCHLORIDE AND TIMOLOL MALEATE 1 DROP: 20; 5 SOLUTION/ DROPS OPHTHALMIC at 20:55

## 2023-02-02 RX ADMIN — PIPERACILLIN AND TAZOBACTAM 3.38 G: 3; .375 INJECTION, POWDER, LYOPHILIZED, FOR SOLUTION INTRAVENOUS at 08:39

## 2023-02-02 RX ADMIN — HEPARIN SODIUM 5000 UNITS: 5000 INJECTION INTRAVENOUS; SUBCUTANEOUS at 20:50

## 2023-02-02 RX ADMIN — SODIUM CHLORIDE 125 ML/HR: 9 INJECTION, SOLUTION INTRAVENOUS at 18:42

## 2023-02-02 RX ADMIN — PIPERACILLIN AND TAZOBACTAM 3.38 G: 3; .375 INJECTION, POWDER, LYOPHILIZED, FOR SOLUTION INTRAVENOUS at 20:50

## 2023-02-02 RX ADMIN — ALBUMIN (HUMAN) 25 G: 0.25 INJECTION, SOLUTION INTRAVENOUS at 17:24

## 2023-02-02 RX ADMIN — Medication 9 MCG/MIN: at 01:19

## 2023-02-02 RX ADMIN — ALBUMIN (HUMAN) 25 G: 0.25 INJECTION, SOLUTION INTRAVENOUS at 11:33

## 2023-02-02 RX ADMIN — HYDROMORPHONE HYDROCHLORIDE 1 MG: 1 INJECTION, SOLUTION INTRAMUSCULAR; INTRAVENOUS; SUBCUTANEOUS at 22:05

## 2023-02-02 RX ADMIN — Medication 4 MCG/MIN: at 08:35

## 2023-02-02 RX ADMIN — ALBUMIN (HUMAN) 25 G: 0.25 INJECTION, SOLUTION INTRAVENOUS at 06:00

## 2023-02-02 RX ADMIN — ACETAMINOPHEN 650 MG: 325 TABLET ORAL at 07:57

## 2023-02-02 NOTE — CONSULTS
RENAL CONSULT  2023    Patient:  Sanya Bridges  :  1970  Gender:  male  MRN #:  [id number]    Reason for Consult: Acute renal failure     History of Present Illness:    Sanya Bridges is a 46y.o. year old male  hypertension, GERD, sleep apnea, Type 2 diabetes mellitus, chronic back pain  presented with right thigh wound, redness, swelling. He was seen by PCP  and clindamycin started   For last few days swelling and pain worsening so presented in ED     In ED he was found to have Hypotensive , BP 77/64 mmHg , elevated lactate , creatinine 3.26 mg/dl   CT scan of thigh - showed right thigh cellulitis   Started on IVF, levophed and antibiotics  When I saw him this afternoon , he says he is doing better     Reports did not have renal disease before. He has SHAHRIAR , prescribed but does not uses CPAP   He reports taking 800 mg of ibuprofen 3-4 times a day for many years . Takes verapamil , HCTZ , triamterene and metoprolol   Says urinating fine, no fever and chills       Past Medical History:   Diagnosis Date    Arthritis     spine    Chronic pain     lumbar    GERD (gastroesophageal reflux disease)     Sleep apnea     CPAP in the past, was 300 lbs, lost weight     Past Surgical History:   Procedure Laterality Date    HX ORTHOPAEDIC      right ankle surgery     No family history on file.   No Known Allergies  Current Facility-Administered Medications   Medication Dose Route Frequency Provider Last Rate Last Admin    acetaminophen (TYLENOL) tablet 650 mg  650 mg Oral Q6H PRN Jonathan Laguna MD   650 mg at 23 0757    oxyCODONE IR (ROXICODONE) tablet 5 mg  5 mg Oral Q4H PRN Lolis Laguna MD        piperacillin-tazobactam (ZOSYN) 3.375 g in 0.9% sodium chloride (MBP/ADV) 100 mL MBP  3.375 g IntraVENous Q8H Letitia Koenig MD 25 mL/hr at 23 1251 3.375 g at 23 1251    HYDROmorphone (DILAUDID) injection 1 mg  1 mg IntraVENous Q4H PRN Letitia Koenig MD   1 mg at 02/02/23 1346    insulin lispro (HUMALOG) injection   SubCUTAneous AC&HS Radha Cotton MD   2 Units at 02/02/23 1133    glucose chewable tablet 16 g  4 Tablet Oral PRN Radha Cotton MD        glucagon (GLUCAGEN) injection 1 mg  1 mg IntraMUSCular PRN Radha Cotton MD        dextrose 10% infusion 0-250 mL  0-250 mL IntraVENous PRN Radha Cotton MD        dorzolamide-timoloL (COSOPT) 22.3-6.8 mg/mL ophthalmic solution 1 Drop  1 Drop Both Eyes BID Radha Cotton MD   1 Drop at 02/02/23 1111    perflutren lipid microspheres (DEFINITY) in NS bolus IV  1 mL IntraVENous RAD ONCE Andrea Carias MD        perflutren lipid microspheres (DEFINITY) in NS bolus IV  1 mL IntraVENous RAD ONCE Andrea Carias MD        sodium chloride (NS) flush 5-10 mL  5-10 mL IntraVENous PRN Parth Orr MD        Vanocmycin - Rx to dose and monitor  1 Each Other Rx Dosing/Monitoring Parth Orr MD        vancomycin (VANCOCIN) 750 mg in 0.9% sodium chloride 250 mL (VIAL-MATE)  750 mg IntraVENous Q24H Parth Orr MD        albumin human 25% (BUMINATE) solution 25 g  25 g IntraVENous Q6H Andrea Carias MD   25 g at 02/02/23 1133    0.9% sodium chloride infusion  125 mL/hr IntraVENous CONTINUOUS Elma SEBASTIAN  mL/hr at 02/02/23 0428 125 mL/hr at 02/02/23 0428    heparin (porcine) injection 5,000 Units  5,000 Units SubCUTAneous Q8H Elma SEBASTIAN MD   5,000 Units at 02/02/23 1133    pantoprazole (PROTONIX) tablet 40 mg  40 mg Oral ACB Elma SEBASTIAN MD   40 mg at 02/02/23 0757    NOREPINephrine (LEVOPHED) 16 mg in 0.9% sodium chloride 250 mL infusion  0.5-30 mcg/min IntraVENous TITRATE Javan Laguna MD   Stopped at 02/02/23 1200         Review of Symptoms:     Consitutional Symptoms: No fever, weight loss, weight gain and fatigue  Eyes:- No change in vision , no itching   Ears, Nose, Mouth, Throat:  No neck pain , swelling ,hearing loss and nose bleed.   Pulmonary: No cough and shortness of breath . CVS: No  Chest pain , palpitation and orthopnea  GI: No nausea, vomiting, abdominal pain, and blood in stool   - No burning, frequency ,urgency  and difficulty voiding . Neurological:No dizzy ness, syncope, focal weakness and numbness . Skin : erythema , swelling and pain in right thigh   Endocrine: No feeling of excessive cold or warmth, hot flushes  Psychiatric: Denied feeling depressed    Objective:    Visit Vitals  BP (!) 150/74   Pulse 91   Temp 97.9 °F (36.6 °C)   Resp 22   Ht 5' 9\" (1.753 m)   Wt 149.7 kg (330 lb)   SpO2 98%   BMI 48.73 kg/m²       Physical Exam:    Pt awake,  alert and comfortable  HEENT: No JVD, anicteric sclera, no neck swelling  Lung: clear to auscultation, no crackles and wheeze  Heart: s1s2 regualr no rubs or murmur  Abdomen: soft, non tender, no guarding, normal bowel sounds. Ext:  trace edema in ankle   Tenderness and swelling in right thigh   CNS- Oriented to time , place and person     Laboratory Data:    Lab Results   Component Value Date    BUN 32 (H) 02/02/2023    BUN 25 (H) 02/01/2023    BUN 30 (H) 01/20/2016     (L) 02/02/2023     02/01/2023     01/20/2016    CO2 23 02/02/2023    CO2 27 02/01/2023    CO2 29 01/20/2016     Lab Results   Component Value Date    WBC 21.7 (H) 02/02/2023    HGB 12.7 (L) 02/02/2023    HCT 40.8 02/02/2023     No components found for: CALCIUM, PHOSPHORUS, MAGNESIUM  No results found for: HDL  No results found for: SPECIMENTYP, TURBIDITY, UGLU    Imaging Reveiwed:    CT thigh- Right thigh cellulitis. Ill-defined phlegmon in the proximal, anterior right  thigh involves the right sartorius muscle. No drainable abscess at this time. No  fracture or osteomyelitis      Retroperitoneal USG- IMPRESSION  Medical renal disease. No evidence for hydronephrosis.     Assessment:  Ten Harman is a 46y.o. year old male  hypertension, GERD, sleep apnea, Type 2 diabetes mellitus, chronic back pain  presented with right thigh wound, redness, swelling. He was seen by PCP  and clindamycin started ,For last few days swelling and pain worsening so presented in ED . He was found to have hypotension, leukocytosis , elevated lactate suggestive of septic shock due to cellulitis     He has been taking NSAIDS for long time and reports Hypertension and DM for long duration   On review of renal trajectory he has mckenna renal function on 1/20/23 with creatinine 1.1 mg /dl   No hydronephrosis in USG     Now he has Acute renal failure with creatinine 3.3 mg/dl  - this is ATN caused by septic shock   Hypertension  Diabetes  SHAHRIAR   Obesity   Septic shock       Plan:    Continue Normal saline 125 cc/hour  Vasopressors to keep MAP around 65 mmhg   Treatment of Cellulitis/septic shock per ICU and ID  Strict intake and output recording , especially urine output   Ensure that patient does not retain urine   Bladder scan daily prn   Avoid NSAIDS, contrast , nephrotoxin   Dose all medicine and antibiotics  for current eGFR   No clinical and metabolic indication of dialysis     Hypertension: was taking metoprolol, verapamil , HCTZ plus triamterene  For now hold antihypertensive   Vasopressors for hypotension    Discussed with patient' family and ICU nurse     Total of 40 minutes of critical care time spent in the direct evaluation and formulation of treatment plan of this high risk patient. The reason for providing this level of medical care was due a critical illness that impaired one or more vital organ systems such that there was a high probability of imminent or life threatening deterioration in the patients condition. This care involved high complexity decision making to assess, manipulate, and support vital system functions, to treat this degreee vital organ system failure and to prevent further life threatening deterioration of the patients condition.            Vanessa Farias MD, MD  [facility].- Nephrology

## 2023-02-02 NOTE — PROGRESS NOTES
Zosyn (Piperacillin/Tazobactam) Extended Infusion    Leandro Kearney, a 46 y.o. yo male, has been converted to an extended infusion of Zosyn while in the intensive care unit. A loading dose of 3.375 or 4.5 gm will be given over 30 minutes depending on indication. Extended infusions will begin 4 hours after the initial dose if CrCl  >/= 20 ml/min or 8 hours after the initial dose if CrCl < 20 ml/min. Extended infusions will run over 4 hours (240 minutes).     Recent Labs     02/02/23  0500 02/01/23  1500   CREA 3.33* 3.26*     Ht Readings from Last 1 Encounters:   02/01/23 175.3 cm (69\")     Wt Readings from Last 1 Encounters:   02/01/23 149.7 kg (330 lb)       CrCl : Serum creatinine: 3.33 mg/dL (H) 02/02/23 0500  Estimated creatinine clearance: 37.5 mL/min (A)    Renal adjustment of extended infusion of Zosyn  3.375 or 4.5 gm every 8 hours for CrCl >/= 20 ml/min  3.375 or 4.5 gm every 12 hours for CrCl < 20 ml/min, intermittent HD or PD

## 2023-02-02 NOTE — PROGRESS NOTES
D/C Plan: Home with HH vs. Rehab and possible IV ABX depending upon clinical progression and therapy recommendations    Care Management    Reason for Admission: cellultis    Chart reviewed. Per H&P: \"Jhoan Rebollar is a 46 y.o. male with hypertension, GERD, sleep apnea presents to ER with concerns of right thigh wound, redness, swelling. Patient reports that he may have fallen about 4 weeks ago. Initially he did not notice anything however later he noticed an open wound. He also started developing redness and swelling. He was seen by PCP who put him on clindamycin. Patient reports that it was improving however over the past couple of days his redness and swelling got worse. He denies any fever, chills, nausea, vomiting. The area of induration mostly on the groin site is very painful. In ER he was noted to be hypotensive with blood pressure of 77/64, white count at 28.1, BUN/creatinine of 25/3.26, lactic acid of 3.3. CT scan of right hip showed right thigh cellulitis. Ill-defined phlegmon in the proximal, anterior right thigh involve the right sartorius muscle. No drainable abscess at this point. No fracture or osteomyelitis.        Prior to admission patient was living: with spouse    Prior to admission patient was using the following DME: has a w/c he uses occasionally                     RUR Score: 6%                   Plan for utilizing home health: anticipate possible HH for IV ABX depending upon clinical progression           PCP: First and Last name: Jose De Jesus Aleman MD    Name of Practice:    Are you a current patient: Yes/No: yes   Approximate date of last visit: yesterday afternoon   Can you participate in a virtual visit with your PCP:                     Current Advanced Directive/Advance Care Plan: Full Code    Healthcare Decision Maker:   Primary Decision MakeGerald Guerrier - [phone]    Secondary Decision Maker: Lisa Mitchell [facility] - [phone]    Secondary Decision Maker: Bonnie Mathews - [phone]  Click here to complete [address] including selection of the Healthcare Decision Maker Relationship (ie \"Primary\")                         Transition of Care Plan: Home with [facility] versus rehab and possible IV ABX depending upon clinical progression and therapy recommendations    CM met with patient at bedside. Patient verified facesheet, PCP, that he lives with spouse, is independent at baseline, his only DME is a w/c he uses \"occasionally\" and denies using home O2 or a CPAP (says he \"used to have\" a CPAP but he quit using it due to clogged nasal passages). He is not active with HH and has not been to SNF for rehab in the past 60 days. Per the patient his spouse or friend will drive him home at discharge. CM notes ID and nephrology is on board. Care Management Interventions  PCP Verified by CM: Yes  Last Visit to PCP: 02/01/23  Palliative Care Criteria Met (RRAT>21 & CHF Dx)?: No  Mode of Transport at Discharge:  Other (see comment) (family or frends to drive)  Transition of Care Consult (CM Consult): Discharge Planning  Health Maintenance Reviewed: Yes  Physical Therapy Consult: No  Occupational Therapy Consult: No  Support Systems: Spouse/Significant Other  Confirm Follow Up Transport: Friends  The Plan for Transition of Care is Related to the Following Treatment Goals : home with [facility] versus rehab depending upon clinical progression and therapy recommendations  The Patient and/or Patient Representative was Provided with a Choice of Provider and Agrees with the Discharge Plan?: Yes  Freedom of Choice List was Provided with Basic Dialogue that Supports the Patient's Individualized Plan of Care/Goals, Treatment Preferences and Shares the Quality Data Associated with the Providers?: Yes  Discharge Location  Patient Expects to be Discharged to[de-identified] Home with home health

## 2023-02-02 NOTE — CONSULTS
[facility]  (A Division of [address] [place] [address])                                                           Date of Admission: 2/1/2023   Date of Note: 2/2/2023  Reason for Consult: cellulitis  Referring MD: Dr Kareem Gonsales    Thank you for involving me in the care of this patient. Please do not hesitate to contact me on the above number if question or concern. Current Antimicrobials:    Prior Antimicrobials:  Zosyn and Vancomycin Clindamycin PO PTA  Ceftriaxone 1 gm IV X1 --2/1/23   Allergy to antibiotics:     Assessment:     Sepsis / leucocytosis / elevated lactic acid 2/2 below-- non sustained hypotension  Right thigh SSI/Cellulitis --possible early abscess  VIKTOR-- acute Vs acute on chronic ( BMP 2019- normal)? No CPK   Pre-diabetes-- On Metformin by history  SHAHRIAR  Morbidly obese- BMI 48  Pul Hypertension-- was on Viagra  History of back surgery- 2016    Recommendation -- ID related:     Check CPK-- assess for myositis  FU BCX   MRSA screen  WCX from right hip is from superficial open wound-- FU until final  Agree with Zosyn and Vanco-- will streamline soon  Monitor locally and with imaging progression of R anterior thigh infection-- may need to have I and D if it collects furthe   ICU RN      HPI:      (Patient interviewed with friend in room.)    Arvin Connell is a 46 y.o. male with hypertension, GERD, sleep apnea ,Pul hypertension and Obese. He had a fall 4 weeks ago which happened when he was pushing heavy set equipment. Had small wound with pain over there that later became red and was given Clindamycin by his PCP- > a week ago. It got better initially, but later he noted anterior thigh -right- swelling and pain, had fever/chills and body aches daily for >4-5 days. He came in to ED on 2/2-- found to have leucocytosis, briefly hypotensive, no tachycardia or hypoxia. CT showed R thigh cellulitis with ill defined phlegmon -- no drain able abscess.  No fracture of OM.    C/O of headaches, right thigh pain, but no N/V/Diarrhea/SOB/ abdominal pain. Voiding urine without any issue. Currently on Vancomycin and zosyn. He is unemployed due to back pain. He used to work in construction    Active Hospital Problems    Diagnosis Date Noted    Controlled type 2 diabetes mellitus, without long-term current use of insulin ([facility] 02/02/2023    Lactic acidosis 02/02/2023    Cellulitis 02/01/2023    Hypotension 02/01/2023    Sepsis ([facility] 02/01/2023    VIKTOR (acute kidney injury) ([facility] 02/01/2023    Septic shock ([facility] 02/01/2023    SHAHRIAR (obstructive sleep apnea) 02/01/2023    BMI 45.0-49.9, adult ([facility] 02/01/2023     Past Medical History:   Diagnosis Date    Arthritis     spine    Chronic pain     lumbar    GERD (gastroesophageal reflux disease)     Sleep apnea     CPAP in the past, was 300 lbs, lost weight     Past Surgical History:   Procedure Laterality Date    HX ORTHOPAEDIC  1990    right ankle surgery     No family history on file. Social History     Socioeconomic History    Marital status:      Spouse name: Not on file    Number of children: Not on file    Years of education: Not on file    Highest education level: Not on file   Occupational History    Not on file   Tobacco Use    Smoking status: Never    Smokeless tobacco: Never   Substance and Sexual Activity    Alcohol use:  Yes     Alcohol/week: 5.0 standard drinks     Types: 5 Shots of liquor per week    Drug use: No    Sexual activity: Yes   Other Topics Concern    Not on file   Social History Narrative    Not on file     Social Determinants of Health     Financial Resource Strain: Not on file   Food Insecurity: Not on file   Transportation Needs: Not on file   Physical Activity: Not on file   Stress: Not on file   Social Connections: Not on file   Intimate Partner Violence: Not on file   Housing Stability: Not on file       Medications:  Current Facility-Administered Medications   Medication Dose Route Frequency acetaminophen (TYLENOL) tablet 650 mg  650 mg Oral Q6H PRN    oxyCODONE IR (ROXICODONE) tablet 5 mg  5 mg Oral Q4H PRN    piperacillin-tazobactam (ZOSYN) 3.375 g in 0.9% sodium chloride (MBP/ADV) 100 mL MBP  3.375 g IntraVENous Q8H    piperacillin-tazobactam (ZOSYN) 3.375 g in 0.9% sodium chloride (MBP/ADV) 100 mL MBP  3.375 g IntraVENous ONCE    HYDROmorphone (DILAUDID) injection 1 mg  1 mg IntraVENous Q4H PRN    sodium chloride (NS) flush 5-10 mL  5-10 mL IntraVENous PRN    Vanocmycin - Rx to dose and monitor  1 Each Other Rx Dosing/Monitoring    vancomycin (VANCOCIN) 750 mg in 0.9% sodium chloride 250 mL (VIAL-MATE)  750 mg IntraVENous Q24H    albumin human 25% (BUMINATE) solution 25 g  25 g IntraVENous Q6H    0.9% sodium chloride infusion  125 mL/hr IntraVENous CONTINUOUS    heparin (porcine) injection 5,000 Units  5,000 Units SubCUTAneous Q8H    pantoprazole (PROTONIX) tablet 40 mg  40 mg Oral ACB    NOREPINephrine (LEVOPHED) 16 mg in 0.9% sodium chloride 250 mL infusion  0.5-30 mcg/min IntraVENous TITRATE        Review of Systems     Negative Unless BOLDED     General: fevers, chills, myalgias, arthralgias, unexplained weight loss, malaise, fatigue. HEENT:  headaches, recent URI  PUlMONARY:  cough , shortness of breath  Cardiovascular: chest pain  GI:   nausea, vomiting, diarrhea, abdominal pain, prior C.diff  :  urinary frequency, dysuria, hematuria, bladder incontinence.    Neurologic:  seizures, syncope or prior CVA/TIA, confusion, memory impairment, neuropathy  Musculoskeletal:  myalgias arthralgias, joint pain/ swelling,  back pain  Skin:  Purities, cellulitis,  chronic stasis ulcer, diabetic foot ulcers  Endocrine: polyuria, polydipsia, hair loss, weight gain  Psych: Denies depression or anxiety       Objective:       Visit Vitals  BP (!) 143/41   Pulse 87   Temp 97.9 °F (36.6 °C)   Resp 27   Ht 5' 9\" (1.753 m)   Wt 149.7 kg (330 lb)   SpO2 95%   BMI 48.73 kg/m²     Temp (24hrs), Av °F (36.7 °C), Min:97.9 °F (36.6 °C), Max:98.1 °F (36.7 °C)      Lines:  PIV    General:   WD Obese , on RA awake alert and oriented   Skin:   no diffuse rashes  Erythema and boggy swelling on proximal upper thigh-- groin area and scrotum-- not involved  Dime sized open wound on R buttock area from fall   HEENT:  Normocephalic, atraumatic, EOMI, no scleral icterus or pallor; no conjunctival hemmohage; No thrush. Dentition good. Neck supple, no bruits. Lymph Nodes:   no cervical, axillary or inguinal adenopathy   Lungs:   non-labored, bilaterally clear to aspiration- no crackles wheezes rales or rhonchi   Heart:  RRR, s1 and s2; no murmurs rubs or gallops, no edema, + pedal pulses   Abdomen:  soft, severely obese, active bowel sounds. Appropriate surgical scars for stated surgeries. Non-tender   Genitourinary:  deferred   Extremities:   no clubbing, cyanosis; no joint effusions or swelling; ; muscle mass appropriate for age   Neurologic:  No gross focal sensory abnormalities;  Cranial nerves intact   Psychiatric:   appropriate and interactive.         Labs: Results:   Chemistry Recent Labs     02/02/23  0500 02/01/23  1500   * 184*   * 136   K 3.9 4.1    99*   CO2 23 27   BUN 32* 25*   CREA 3.33* 3.26*   CA 8.2* 9.6   AGAP 9 10   BUCR 10* 8*   AP  --  71   TP  --  7.7   ALB  --  2.8*   GLOB  --  4.9*   AGRAT  --  0.6*      CBC w/Diff Recent Labs     02/02/23  0500 02/01/23  1500   WBC 21.7* 28.1*   RBC 4.62 5.30   HGB 12.7* 14.8   HCT 40.8 46.0    245   GRANS  --  87*   LYMPH  --  3*   EOS  --  1            No results found for: DEANDRE Lab Results   Component Value Date/Time    Culture result: PENDING 02/01/2023 03:20 PM    Culture result:  01/20/2016 09:45 AM     MRSA target DNA is not detected (presumptive not colonized with MRSA)        Results       Procedure Component Value Units Date/Time    CULTURE, BLOOD [[id number]] Collected: 02/02/23 0400    Order Status: Canceled Specimen: Blood     CULTURE, Mat Nielson STAIN [[id number]] Collected: 02/01/23 1520    Order Status: Completed Specimen: Wound from Abscess Updated: 02/01/23 1712     Special Requests: --        RIGHT  HIP       GRAM STAIN OCCASIONAL WBCS SEEN               2+ Gram positive cocci IN PAIRS           Culture result: PENDING    CULTURE, BLOOD [[id number]] Collected: 02/01/23 1500    Order Status: Sent Specimen: Blood Updated: 02/01/23 1536              Imaging: All imaging reviewed from Admission to present as per radiology interpretation in [address] [facility]  Filiberto Hallman MD  [place] [facility])  Office #:     [phone]-CNEOCR #[phone]   Office Fax: [phone]

## 2023-02-02 NOTE — ACP (ADVANCE CARE PLANNING)
Advance Care Planning   Advance Care Planning Inpatient Note  [facility]    Today's Date: 2/2/2023  [facility]    Received request from rounding. Upon review of chart and communication with care team, patient's decision making abilities are not in question. Patient was alone in the room during visit. Goals of ACP Conversation:  Discuss Advance Care planning documents    Health Care Decision Makers:      Primary Decision Maker: Lorene Castellano - [phone]    Secondary Decision Maker: Shabbir Tyler - Parent - [phone]    Secondary Decision Maker: Anurag Pantoja - Sister - [phone]    Summary:  Completed New Documents    Advance Care Planning Documents (Patient Wishes) on file:  Healthcare Power of /Advance Directive appointment of Health care agent  Living Will/ Advance Directive  Anatomical Gift/Organ donation     Assessment:    Patient wanted his wife first and then his parents and sister and brother-in-law. He didn't want \"everything thrown on her (his wife) alone. \" Let him know that she can pull in others to help make decisions but she would be the final decision/one to sign consents. He is comfortable with that.       Interventions:  Provided education on documents for clarity and greater understanding  Assisted in the completion of documents according to patient's wishes at this time  Encouraged ongoing ACP conversation with future decision makers and loved ones    Care Preferences Communicated:  No    Outcomes/Plan:  ACP Discussion Completed  New Advance Directive completed  Returned original document(s) to patient, as well as copies for distribution to appointed agents  Copy of Advance Directive given to staff to scan into medical record    [facility] on 2/2/2023 at 2:14 PM

## 2023-02-02 NOTE — H&P
History & Physical    Patient: Pacheco Harrison MRN: [id number]  CSN: [id number]    YOB: 1970  Age: 46 y.o. Sex: male      DOA: 2/1/2023  Primary Care Provider:  Akash Crawford MD      Assessment/Plan     Patient Active Problem List   Diagnosis Code    Lumbar stenosis M48.061    Cellulitis L03.90    Hypotension I95.9    Sepsis ([facility] [address] A41.9    VIKTOR (acute kidney injury) ([facility] [address] N17.9    Septic shock ([facility] [address] A41.9, R65.21    SHAHRIAR (obstructive sleep apnea) G47.33    BMI 45.0-49.9, adult ([facility] [address] Z68.42       Admit to ICU    CRITICAL CARE PLAN    Resp - no acute respiratory issues, will use oxygen by NC as needed  SHAHRIAR - had CPAP years ago, does not use CPAP    ID - Follow up blood  cx. Cellulitis - right thigh  Open wound right thigh  ANTIBIOTICS vancomycin, ceftriaxone. Trend temps, wbc, LA. CVS - Monitor HD. Septic shock - IVF, albumin, start on levophed if BP not improved    Heme/onc - Follow H&H, plts. Renal - Trend BUN, Cr, follow I/O. Check and replace Mg, K, phos. VIKTOR - started on IVF  Get renal US  Avoid nephrotoxins    Endocrine -  Follow FSG    Neuro/ Pain/ Sedation -   Stable mental status    GI -   Diabetic diet. Prophylaxis - DVT: heparin, GI: protonix     Estimated length of stay : 3-5 days      [id number]  40 minutes of critical care time spent in the direct evaluation and treatment of this high risk patient. The reason for providing this level of medical care for this critically ill patient was due a critical illness that impaired one or more vital organ systems such that there was a high probability of imminent or life threatening deterioration in the patients condition. This care involved high complexity decision making to assess, manipulate, and support vital system functions, to treat this degreee vital organ system failure and to prevent further life threatening deterioration of the patients condition.         CC: right thigh cellulitis       HPI:     Pacheco Harrison is a 46 y.o. male with hypertension, GERD, sleep apnea presents to ER with concerns of right thigh wound, redness, swelling. Patient reports that he may have fallen about 4 weeks ago. Initially he did not notice anything however later he noticed an open wound. He also started developing redness and swelling. He was seen by PCP who put him on clindamycin. Patient reports that it was improving however over the past couple of days his redness and swelling got worse. He denies any fever, chills, nausea, vomiting. The area of induration mostly on the groin site is very painful. In ER he was noted to be hypotensive with blood pressure of 77/64, white count at 28.1, BUN/creatinine of 25/3.26, lactic acid of 3.3. CT scan of right hip showed right thigh cellulitis. Ill-defined phlegmon in the proximal, anterior right thigh involve the right sartorius muscle. No drainable abscess at this point. No fracture or osteomyelitis. Past Medical History:   Diagnosis Date    Arthritis     spine    Chronic pain     lumbar    GERD (gastroesophageal reflux disease)     Sleep apnea     CPAP in the past, was 300 lbs, lost weight       Past Surgical History:   Procedure Laterality Date    HX ORTHOPAEDIC  1990    right ankle surgery       No family history on file. Social History     Socioeconomic History    Marital status:    Tobacco Use    Smoking status: Never    Smokeless tobacco: Never   Substance and Sexual Activity    Alcohol use: Yes     Alcohol/week: 5.0 standard drinks     Types: 5 Shots of liquor per week    Drug use: No    Sexual activity: Yes       Prior to Admission medications    Medication Sig Start Date End Date Taking? Authorizing Provider   oxyCODONE-acetaminophen (PERCOCET)  mg per tablet Take 1 Tab by mouth every six (6) hours as needed for Pain. Provider, Historical   lisinopril (PRINIVIL, ZESTRIL) 40 mg tablet Take 40 mg by mouth daily.     Provider, Historical   amLODIPine (NORVASC) 5 mg tablet Take 5 mg by mouth daily. Provider, Historical   phenylephrine (NEOSYNEPHRINE) 1 % spry 2 Sprays by Both Nostrils route every six (6) hours as needed. Provider, Historical       No Known Allergies    Review of Systems  Gen: No fever, chills, malaise, weight loss/gain. Heent: No headache, rhinorrhea, epistaxis, ear pain, hearing loss, sinus pain, neck pain/stiffness, sore throat. Heart: No chest pain, palpitations, MORENO, pnd, or orthopnea. Resp: No cough, hemoptysis, wheezing and shortness of breath. GI: No nausea, vomiting, diarrhea, constipation, melena or hematochezia. : No urinary obstruction, dysuria or hematuria. Derm: see above. Musc/skeletal: no bone or joint complains. Vasc: No edema, cyanosis or claudication. Endo: No heat/cold intolerance, no polyuria,polydipsia or polyphagia. Neuro: No unilateral weakness, numbness, tingling. No seizures. Heme: No easy bruising or bleeding. Physical Exam:     Physical Exam:  Visit Vitals  /85   Pulse 63   Temp 98.1 °F (36.7 °C)   Resp 20   Ht 5' 9\" (1.753 m)   Wt 149.7 kg (330 lb)   SpO2 93%   BMI 48.73 kg/m²           Temp (24hrs), Av.1 °F (36.7 °C), Min:98.1 °F (36.7 °C), Max:98.1 °F (36.7 °C)    No intake/output data recorded. No intake/output data recorded. General:  Awake, cooperative, no distress. Head:  Normocephalic, without obvious abnormality, atraumatic. Eyes:  Conjunctivae/corneas clear, sclera anicteric, PERRL, EOMs intact. Nose: Nares normal. No drainage or sinus tenderness. Throat: Lips, mucosa, and tongue normal.    Neck: Supple, symmetrical, trachea midline, no adenopathy. Lungs:   Clear to auscultation bilaterally. Heart:   S1, S2, no murmur, click, rub or gallop. Abdomen: Soft, non-tender. Bowel sounds normal. No masses,  No organomegaly. Extremities: Extremities normal, atraumatic, no cyanosis or edema. Capillary refill normal.   Pulses: 2+ and symmetric all extremities. Skin: Skin color pink, turgor normal. No rashes or lesions   Neurologic: CNII-XII intact. No focal motor or sensory deficit. Labs Reviewed:    CMP:   Lab Results   Component Value Date/Time     02/01/2023 03:00 PM    K 4.1 02/01/2023 03:00 PM    CL 99 (L) 02/01/2023 03:00 PM    CO2 27 02/01/2023 03:00 PM    AGAP 10 02/01/2023 03:00 PM     (H) 02/01/2023 03:00 PM    BUN 25 (H) 02/01/2023 03:00 PM    CREA 3.26 (H) 02/01/2023 03:00 PM    CA 9.6 02/01/2023 03:00 PM    ALB 2.8 (L) 02/01/2023 03:00 PM    TP 7.7 02/01/2023 03:00 PM    GLOB 4.9 (H) 02/01/2023 03:00 PM    AGRAT 0.6 (L) 02/01/2023 03:00 PM    ALT 29 02/01/2023 03:00 PM     CBC:   Lab Results   Component Value Date/Time    WBC 28.1 (H) 02/01/2023 03:00 PM    HGB 14.8 02/01/2023 03:00 PM    HCT 46.0 02/01/2023 03:00 PM     02/01/2023 03:00 PM         Procedures/imaging: see electronic medical records for all procedures/Xrays and details which were not copied into this note but were reviewed prior to creation of Plan    Please note that this dictation was completed with [employer], the [employer] voice recognition software. Quite often unanticipated grammatical, syntax, homophones, and other interpretive errors are inadvertently transcribed by the computer software. Please disregard these errors. Please excuse any errors that have escaped final proofreading.         CC: Kvng Bautista MD

## 2023-02-02 NOTE — PROGRESS NOTES
Hospitalist Progress Note    Patient: Mina Gilmore MRN: [id number]  CSN: [id number]    YOB: 1970  Age: 46 y.o. Sex: male    DOA: 2/1/2023 LOS:  LOS: 1 day                Assessment/Plan     Patient Active Problem List   Diagnosis Code    Lumbar stenosis M48.061    Cellulitis L03.90    Hypotension I95.9    Sepsis ([facility] [address] A41.9    VIKTOR (acute kidney injury) ([facility] [address] N17.9    Septic shock (HCC) A41.9, R65.21    SHAHRIAR (obstructive sleep apnea) G47.33    BMI 45.0-49.9, adult ([facility] [address] Z68.42    Controlled type 2 diabetes mellitus, without long-term current use of insulin (HCC) E11.9    Lactic acidosis E87.20        Chief complaint :  Sepsis, cellulitis. 46 y.o. male with hypertension, GERD, sleep apnea presents to ER with concerns of right thigh wound, redness, swelling. CRITICAL CARE PLAN     Resp - no acute respiratory issues, will use oxygen by NC as needed  SHAHRIAR - had CPAP years ago, does not use CPAP. ID - Follow up blood  cx. Cellulitis - right thigh  Open wound right thigh  ANTIBIOTICS vancomycin, zosyn. ID consulted     CVS - Monitor HD. Septic shock - IVF, albumin,    on levophed, wean as tolerated     Heme/onc - Follow H&H, plts. Renal - Trend BUN, Cr, follow I/O. Check and replace Mg, K, phos. VIKTOR - started on IVF  renal US with no obstruction  Avoid nephrotoxins  Nephrology consulted. Endocrine -  Follow FSG     Neuro/ Pain/ Sedation -   Stable mental status     GI -   Diabetic diet. Prophylaxis - DVT: heparin, GI: protonix. Estimated length of stay : 3-4 days. [id number]  35 minutes of critical care time spent in the direct evaluation and treatment of this high risk patient. The reason for providing this level of medical care for this critically ill patient was due a critical illness that impaired one or more vital organ systems such that there was a high probability of imminent or life threatening deterioration in the patients condition.  This care involved high complexity decision making to assess, manipulate, and support vital system functions, to treat this degreee vital organ system failure and to prevent further life threatening deterioration of the patients condition. Review of systems  General: No fevers or chills. Cardiovascular: No chest pain or pressure. No palpitations. Pulmonary: No shortness of breath. Gastrointestinal: No nausea, vomiting. Physical Exam:  General: Awake, cooperative, no acute distress    HEENT: NC, Atraumatic. PERRLA, anicteric sclerae. Lungs: CTA Bilaterally. No Wheezing/Rhonchi/Rales. Heart:  S1 S2,  No murmur, No Rubs, No Gallops  Abdomen: Soft, Non distended, Non tender. +Bowel sounds,   Extremities: Right thigh around groin and right hip are with redness, swelling and induration. TTP. Open wound right thigh  Psych:   Not anxious or agitated. Neurologic:  No acute neurological deficit. Vital signs/Intake and Output:  Visit Vitals  BP (!) 150/74   Pulse 91   Temp 97.9 °F (36.6 °C)   Resp 22   Ht 5' 9\" (1.753 m)   Wt 149.7 kg (330 lb)   SpO2 98%   BMI 48.73 kg/m²     Current Shift:  02/02 0701 - 02/02 1900  In: 340 [P.O.:240; I.V.:100]  Out: 1200 [Urine:1200]  Last three shifts:  01/31 1901 - 02/02 0700  In: 1609.9 [I.V.:1609.9]  Out: 700 [Urine:700]            Labs: Results:       Chemistry Recent Labs     02/02/23  0500 02/01/23  1500   * 184*   * 136   K 3.9 4.1    99*   CO2 23 27   BUN 32* 25*   CREA 3.33* 3.26*   CA 8.2* 9.6   AGAP 9 10   BUCR 10* 8*   AP  --  71   TP  --  7.7   ALB  --  2.8*   GLOB  --  4.9*   AGRAT  --  0.6*      CBC w/Diff Recent Labs     02/02/23  0500 02/01/23  1500   WBC 21.7* 28.1*   RBC 4.62 5.30   HGB 12.7* 14.8   HCT 40.8 46.0    245   GRANS  --  87*   LYMPH  --  3*   EOS  --  1      Cardiac Enzymes No results for input(s): CPK, CKND1, ADELINA in the last 72 hours.     No lab exists for component: CKRMB, TROIP   Coagulation No results for input(s): PTP, INR, APTT, INREXT in the last 72 hours. Lipid Panel No results found for: CHOL, CHOLPOCT, CHOLX, CHLST, CHOLV, 655312, HDL, HDLP, LDL, LDLC, DLDLP, 381431, VLDLC, VLDL, TGLX, TRIGL, TRIGP, TGLPOCT, CHHD, CHHDX   BNP No results for input(s): BNPP in the last 72 hours.    Liver Enzymes Recent Labs     02/01/23  1500   TP 7.7   ALB 2.8*   AP 71      Thyroid Studies No results found for: T4, T3U, TSH, TSHEXT     Procedures/imaging: see electronic medical records for all procedures/Xrays and details which were not copied into this note but were reviewed prior to creation of Plan

## 2023-02-02 NOTE — PROGRESS NOTES
2000 - Arrived from ED. Care receive and shift assessment complete. Alert and orientated, right leg and hip red in color, warm to touch, tender when touched. No other distress noted. 2030 - SBP in 80s, Dr. Tucker Cabello notified, orders received. 2345 - Reassessment complete, no new changes. 0145 - Complain of migraine, Dr. Tucker Cabello notified, orders received. 0230 - Reassessment complete, NAD.     0345 - Reassessment complete, no new changes.

## 2023-02-02 NOTE — PROGRESS NOTES
[address] [facility] Pharmacokinetic Monitoring Service - Vancomycin    Consulting Provider: [facility]   Indication: SSTI  Target Concentration: AUC/TIMOTHY <500 mg*hr/L  Day of Therapy: 2  Additional Antimicrobials: Zosyn    Pertinent Laboratory Values: Wt Readings from Last 1 Encounters:   02/02/23 149.7 kg (330 lb)     Temp Readings from Last 1 Encounters:   02/02/23 97.9 °F (36.6 °C)     No components found for: PROCAL  Estimated Creatinine Clearance: 37.5 mL/min (A) (based on SCr of 3.33 mg/dL (H)).   Recent Labs     02/02/23  0500 02/01/23  1500   WBC 21.7* 28.1*     Assessment:  Date/Time Current Dose Concentration Timing of Concentration (h) AUC   2/2/23 0500 Vancomycin 2000 mg x 1 then 750 mg q24h 16.5 mcg/ml 13 hours since last dose 400 mg/L.hr   Note: Serum concentrations collected for AUC dosing may appear elevated if collected in close proximity to the dose administered, this is not necessarily an indication of toxicity    Plan:  Current dosing regimen is therapeutic  Continue current dose vancomycin 750 mg IV q24h @ 1500 2/2/23  Pharmacy will continue to monitor patient and adjust therapy as indicated    Thank you for the consult,  BOWEN Shepard  2/2/2023 11:09 AM

## 2023-02-02 NOTE — CONSULTS
[facility]  Pulmonary, Critical Care, and Sleep Medicine    Name: Tatum Davidson MRN: [id number]   : 1970 Hospital: [facility] [place]    Date: 2023  Room: [address] [place] [address] Note                                              Consult requesting physician: Dr. Balbina Ontiveros  Reason for Consult: Septic shock       IMPRESSION:   Septic shock  Right thigh cellulitis  Acute renal failure  Lactic acidosis  Type 2 diabetes mellitus  SHAHRIAR-not using CPAP  Morbid obesity    Patient Active Problem List   Diagnosis Code    Lumbar stenosis M48.061    Cellulitis L03.90    Hypotension I95.9    Sepsis ([address] A41.9    VIKTOR (acute kidney injury) ([address] N17.9    Septic shock ([address] A41.9, R65.21    SHAHRIAR (obstructive sleep apnea) G47.33    BMI 45.0-49.9, adult ([address] Z68.42    Controlled type 2 diabetes mellitus, without long-term current use of insulin ([address] E11.9    Lactic acidosis E87.20         Code status: Full code      RECOMMENDATIONS:   Patient with type 2 diabetes and obesity, presenting with right thigh cellulitis and septic shock. Respiratory: Stable respirations; on room air. History of SHAHRIAR, but not able to use CPAP therapy in the past; discussed about CPAP therapy use while in the hospital at nighttime, but patient declined. Chest x-ray reviewed images-cardiomegaly; no focal infiltrates or consolidation; no pleural effusions. Keep SPO2 >=92%. HOB 30 degree elevation all the time. Aspiration precautions. Incentive spirometry. CVS: Monitor blood pressure; Levophed can be weaned down-target systolic blood pressure greater than 95 mmHg; diastolic readings not accurate since blood pressure cuff in the wrist.  Check echo. Ultrasound right leg-negative for DVT. ID: Follow cultures; Leukocytosis due to sepsis. Broad-spectrum antibiotics-switch from ceftriaxone to Zosyn for anaerobic coverage; IV vancomycin; ID consulted. Antibiotics per renal dosing per pharmacy protocol.   Wound care consulted. Lactic acid resolved. Deescalate antibiotic when appropriate. Hematology/Oncology: Monitor hemoglobin and platelets; no active bleeding issues. Renal: Monitor renal function and urine output. Check ultrasound kidneys. Check Ur Na and Crt. If renal function does not improve, would need nephrology consultation. GI: No active issues. Endocrine: Monitor BS. SSI ordered. Neurology: Normal mentation. Pain/Sedation: Prn Dilaudid for thigh and low back pains. Skin/Wound: Wound care consult. Electrolytes: Replace electrolytes per ICU electrolyte replacement protocol; caution renal failure. IVF:  ml/hr. Nutrition: Oral ADA diet. Prophylaxis: DVT Prophylaxis: Sc heparin. GI Prophylaxis: Protonix. Lines/Tubes: PIVs, midlines  Quality Care: PPI, DVT prophylaxis, HOB elevated, Infection control all reviewed and addressed. Care of plan d/w RN. D/w patient, and updated management plans in detail. (answered all questions to satisfaction). High complexity decision making was performed during the evaluation of this patient at high risk for decompensation with multiple organ involvement. Total critical care time spent rendering care exclusive of procedures/family discussion/coordination of care: 42 minutes. Subjective/History of Present Illness:     Patient is a [address] y.o. male with PMHx significant for type 2 diabetes mellitus, SHAHRIAR, but not using CPAP therapy due to intolerance, obesity, lumbar surgeries with chronic lumbar pains. Patient came to the ER yesterday with right thigh cellulitis and septic shock. Patient reports history of oral infection about 4 to 5 weeks ago, which resolved. Subsequently, a week later he fell on his right thigh but did not note any injuries. He noticed pain, and progressive swelling and inflammation in the next few days. Subsequently, he started seeing ulceration in the right upper thigh laterally with some drainage. Patient seen in ICU.   He is awake and alert. No acute issues overnight. No chest pain or palpitations. No cough or shortness of breath. No vomiting or diarrhea. Complaining of right thigh and low back pains. Telemetry-sinus rhythm. Blood pressure being recorded from right wrist due to both arm midlines. Patient on Levophed-currently 7 mcg/min. Urine output-700 ml overnight. Review of Systems:   HEENT: No epistaxis, no nasal drainage, no difficulty in swallowing  Respiratory: No cough or shortness of breath; non-smoker  Cardiovascular: no chest pain, no palpitations, no chronic leg edema  Gastrointestinal: no abd pain, no vomiting, no diarrhea, no bleeding symptoms  Genitourinary: No urinary symptoms or hematuria  Musculoskeletal: As above  Neurological: No focal weakness, no seizures, no headaches  Behvioral/Psych: No anxiety, no depression  Constitutional: No fever, no chills    No Known Allergies   Past Medical History:   Diagnosis Date    Arthritis     spine    Chronic pain     lumbar    GERD (gastroesophageal reflux disease)     Sleep apnea     CPAP in the past, was 300 lbs, lost weight      Past Surgical History:   Procedure Laterality Date    HX ORTHOPAEDIC  1990    right ankle surgery      Social History     Tobacco Use    Smoking status: Never    Smokeless tobacco: Never   Substance Use Topics    Alcohol use: Yes     Alcohol/week: 5.0 standard drinks     Types: 5 Shots of liquor per week      No family history on file. Prior to Admission medications    Medication Sig Start Date End Date Taking? Authorizing Provider   metoprolol tartrate (LOPRESSOR) 100 mg IR tablet Take 100 mg by mouth two (2) times a day. Yes Provider, Historical   ezetimibe (ZETIA) 10 mg tablet Take 10 mg by mouth daily. Yes Provider, Historical   rosuvastatin (CRESTOR) 40 mg tablet Take 40 mg by mouth nightly. Yes Provider, Historical   latanoprost (XALATAN) 0.005 % ophthalmic solution Administer 1 Drop to both eyes nightly.    Yes Provider, Historical   metFORMIN ER (GLUCOPHAGE XR) 500 mg tablet Take 500 mg by mouth daily (with dinner). Yes Provider, Historical   verapamiL (CALAN) 120 mg tablet Take 120 mg by mouth three (3) times daily. Only been taking 2x daily, took 1 tab this morning   Yes Provider, Historical   buPROPion XL (WELLBUTRIN XL) 300 mg XL tablet Take 300 mg by mouth daily. Yes Provider, Historical   clindamycin (CLEOCIN) 300 mg capsule Take 300 mg by mouth three (3) times daily. Has 100 mg bottle will capsules at bedside as well to make a total of 400 mg   Yes Provider, Historical   oxyCODONE-acetaminophen (PERCOCET 10)  mg per tablet Take 1 Tab by mouth every six (6) hours as needed for Pain. Yes Provider, Historical   lisinopril (PRINIVIL, ZESTRIL) 40 mg tablet Take 40 mg by mouth daily. Patient not taking: Reported on 2/1/2023    Provider, Historical   amLODIPine (NORVASC) 5 mg tablet Take 5 mg by mouth daily. Patient not taking: Reported on 2/1/2023    Provider, Historical   phenylephrine (NEOSYNEPHRINE) 1 % spry 2 Sprays by Both Nostrils route every six (6) hours as needed.   Patient not taking: Reported on 2/1/2023    Provider, Historical     Current Facility-Administered Medications   Medication Dose Route Frequency    Vanocmycin - Rx to dose and monitor  1 Each Other Rx Dosing/Monitoring    vancomycin (VANCOCIN) 750 mg in 0.9% sodium chloride 250 mL (VIAL-MATE)  750 mg IntraVENous Q24H    cefTRIAXone (ROCEPHIN) 2 g in 0.9% sodium chloride (MBP/ADV) 50 mL MBP  2 g IntraVENous Q24H    albumin human 25% (BUMINATE) solution 25 g  25 g IntraVENous Q6H    0.9% sodium chloride infusion  125 mL/hr IntraVENous CONTINUOUS    heparin (porcine) injection 5,000 Units  5,000 Units SubCUTAneous Q8H    pantoprazole (PROTONIX) tablet 40 mg  40 mg Oral ACB    NOREPINephrine (LEVOPHED) 16 mg in 0.9% sodium chloride 250 mL infusion  0.5-30 mcg/min IntraVENous TITRATE         Objective:   Vital Signs:    Visit Vitals  /77   Pulse 81   Temp 97.9 °F (36.6 °C)   Resp 28   Ht 5' 9\" (1.753 m)   Wt 149.7 kg (330 lb)   SpO2 100%   BMI 48.73 kg/m²       O2 Device: None (Room air)       Temp (24hrs), Av °F (36.7 °C), Min:97.9 °F (36.6 °C), Max:98.1 °F (36.7 °C)       Intake/Output:   Last shift:      No intake/output data recorded.     Last 3 shifts:  1901 -  0700  In: 1609.9 [I.V.:1609.9]  Out: 700 [Urine:700]      Intake/Output Summary (Last 24 hours) at 2023 8845  Last data filed at 2023 0615  Gross per 24 hour   Intake 1609.92 ml   Output 700 ml   Net 909.92 ml       Last 3 Recorded Weights in this Encounter    23 1438   Weight: 149.7 kg (330 lb)        Physical Exam:   Comfortable; on room air; obese; acyanotic  HEENT: pupils not dilated, reactive, no scleral jaundice, moist oral mucosa  Neck: No adenopathy or thyroid swelling  CVS: S1S2 no murmurs; JVD not elevated; telemetry-sinus rhythm  RS: Good air entry bilaterally, normal respirations, no wheezes or crackles  Abd: soft, non tender, no hepatosplenomegaly, no abd distension, no guarding or rigidity, bowel sounds heard  Neuro: non focal, awake, alert  Extrm: no leg edema or swelling or clubbing; right upper thigh erythematous and swollen  Skin: no rash; right upper thigh small ulcer laterally  Lymphatic: no cervical or supraclavicular adenopathy  Psych: Cooperative        Data:       Recent Results (from the past 24 hour(s))   TROPONIN-HIGH SENSITIVITY    Collection Time: 23  3:00 PM   Result Value Ref Range    Troponin-High Sensitivity 16 0 - 78 ng/L   METABOLIC PANEL, COMPREHENSIVE    Collection Time: 23  3:00 PM   Result Value Ref Range    Sodium 136 136 - 145 mmol/L    Potassium 4.1 3.5 - 5.5 mmol/L    Chloride 99 (L) 100 - 111 mmol/L    CO2 27 21 - 32 mmol/L    Anion gap 10 3.0 - 18 mmol/L    Glucose 184 (H) 74 - 99 mg/dL    BUN 25 (H) 7.0 - 18 MG/DL    Creatinine 3.26 (H) 0.6 - 1.3 MG/DL    BUN/Creatinine ratio 8 (L) 12 - 20      eGFR 22 (L) >60 ml/min/1.73m2    Calcium 9.6 8.5 - 10.1 MG/DL    Bilirubin, total 0.8 0.2 - 1.0 MG/DL    ALT (SGPT) 29 16 - 61 U/L    AST (SGOT) 12 10 - 38 U/L    Alk. phosphatase 71 45 - 117 U/L    Protein, total 7.7 6.4 - 8.2 g/dL    Albumin 2.8 (L) 3.4 - 5.0 g/dL    Globulin 4.9 (H) 2.0 - 4.0 g/dL    A-G Ratio 0.6 (L) 0.8 - 1.7     CBC WITH AUTOMATED DIFF    Collection Time: 02/01/23  3:00 PM   Result Value Ref Range    WBC 28.1 (H) 4.6 - 13.2 K/uL    RBC 5.30 4.35 - 5.65 M/uL    HGB 14.8 13.0 - 16.0 g/dL    HCT 46.0 36.0 - 48.0 %    MCV 86.8 78.0 - 100.0 FL    MCH 27.9 24.0 - 34.0 PG    MCHC 32.2 31.0 - 37.0 g/dL    RDW 13.7 11.6 - 14.5 %    PLATELET 930 312 - 589 K/uL    MPV 10.6 9.2 - 11.8 FL    NRBC 0.0 0  WBC    ABSOLUTE NRBC 0.00 0.00 - 0.01 K/uL    NEUTROPHILS 87 (H) 40 - 73 %    LYMPHOCYTES 3 (L) 21 - 52 %    MONOCYTES 6 3 - 10 %    EOSINOPHILS 1 0 - 5 %    BASOPHILS 0 0 - 2 %    IMMATURE GRANULOCYTES 3 (H) 0.0 - 0.5 %    ABS. NEUTROPHILS 24.5 (H) 1.8 - 8.0 K/UL    ABS. LYMPHOCYTES 0.8 (L) 0.9 - 3.6 K/UL    ABS. MONOCYTES 1.7 (H) 0.05 - 1.2 K/UL    ABS. EOSINOPHILS 0.3 0.0 - 0.4 K/UL    ABS. BASOPHILS 0.0 0.0 - 0.1 K/UL    ABS. IMM. GRANS. 0.8 (H) 0.00 - 0.04 K/UL    DF AUTOMATED      PLATELET COMMENTS ADEQUATE PLATELETS      RBC COMMENTS POLYCHROMASIA  1+        RBC COMMENTS        OVALOCYTES  FEW  SCHISTOCYTES  FEW  ODILIA CELLS  FEW      WBC COMMENTS VACUOLATED POLYS     LACTIC ACID    Collection Time: 02/01/23  3:00 PM   Result Value Ref Range    Lactic acid 3.3 (HH) 0.4 - 2.0 MMOL/L   RPR    Collection Time: 02/01/23  3:00 PM   Result Value Ref Range    RPR NONREACTIVE NR     POC LACTIC ACID    Collection Time: 02/01/23  3:01 PM   Result Value Ref Range    Lactic Acid (POC) 3.44 (HH) 0.40 - 2.00 mmol/L   CULTURE, WOUND W GRAM STAIN    Collection Time: 02/01/23  3:20 PM    Specimen: Abscess;  Wound   Result Value Ref Range    Special Requests: RIGHT  HIP        GRAM STAIN OCCASIONAL WBCS SEEN      GRAM STAIN 2+ Gram positive cocci IN PAIRS      Culture result: PENDING    EKG, 12 LEAD, INITIAL    Collection Time: 02/01/23  3:21 PM   Result Value Ref Range    Ventricular Rate 56 BPM    QRS Duration 86 ms    Q-T Interval 404 ms    QTC Calculation (Bezet) 389 ms    Calculated R Axis 25 degrees    Calculated T Axis 11 degrees    Diagnosis       Junctional rhythm with retrograde conduction  Abnormal ECG  When compared with ECG of 20-JAN-2016 10:15,  Junctional rhythm has replaced Sinus rhythm  T wave inversion now evident in Inferior leads  Nonspecific T wave abnormality now evident in Anterior leads     GLUCOSE, POC    Collection Time: 02/01/23  6:47 PM   Result Value Ref Range    Glucose,  (H) 74 - 106 MG/DL    Performed by Evan Vidales    LACTIC ACID    Collection Time: 02/02/23 12:15 AM   Result Value Ref Range    Lactic acid 1.5 0.4 - 2.0 MMOL/L   VANCOMYCIN, RANDOM    Collection Time: 02/02/23  5:00 AM   Result Value Ref Range    Vancomycin, random 16.5 5.0 - 94.8 UG/ML   METABOLIC PANEL, BASIC    Collection Time: 02/02/23  5:00 AM   Result Value Ref Range    Sodium 135 (L) 136 - 145 mmol/L    Potassium 3.9 3.5 - 5.5 mmol/L    Chloride 103 100 - 111 mmol/L    CO2 23 21 - 32 mmol/L    Anion gap 9 3.0 - 18 mmol/L    Glucose 140 (H) 74 - 99 mg/dL    BUN 32 (H) 7.0 - 18 MG/DL    Creatinine 3.33 (H) 0.6 - 1.3 MG/DL    BUN/Creatinine ratio 10 (L) 12 - 20      eGFR 21 (L) >60 ml/min/1.73m2    Calcium 8.2 (L) 8.5 - 10.1 MG/DL   CBC W/O DIFF    Collection Time: 02/02/23  5:00 AM   Result Value Ref Range    WBC 21.7 (H) 4.6 - 13.2 K/uL    RBC 4.62 4.35 - 5.65 M/uL    HGB 12.7 (L) 13.0 - 16.0 g/dL    HCT 40.8 36.0 - 48.0 %    MCV 88.3 78.0 - 100.0 FL    MCH 27.5 24.0 - 34.0 PG    MCHC 31.1 31.0 - 37.0 g/dL    RDW 13.9 11.6 - 14.5 %    PLATELET 095 325 - 527 K/uL    MPV 11.3 9.2 - 11.8 FL    NRBC 0.0 0  WBC    ABSOLUTE NRBC 0.00 0.00 - 0.01 K/uL         Chemistry Recent Labs     02/02/23  0500 02/01/23  1500   * 184*   * 136   K 3.9 4.1    99*   CO2 23 27   BUN 32* 25*   CREA 3.33* 3.26*   CA 8.2* 9.6   AGAP 9 10   BUCR 10* 8*   AP  --  71   TP  --  7.7   ALB  --  2.8*   GLOB  --  4.9*   AGRAT  --  0.6*        Lactic Acid Lactic acid   Date Value Ref Range Status   02/02/2023 1.5 0.4 - 2.0 MMOL/L Final     Recent Labs     02/02/23  0015 02/01/23  1500   LAC 1.5 3.3*        Liver Enzymes Protein, total   Date Value Ref Range Status   02/01/2023 7.7 6.4 - 8.2 g/dL Final     Albumin   Date Value Ref Range Status   02/01/2023 2.8 (L) 3.4 - 5.0 g/dL Final     Globulin   Date Value Ref Range Status   02/01/2023 4.9 (H) 2.0 - 4.0 g/dL Final     A-G Ratio   Date Value Ref Range Status   02/01/2023 0.6 (L) 0.8 - 1.7   Final     Alk. phosphatase   Date Value Ref Range Status   02/01/2023 71 45 - 117 U/L Final     Recent Labs     02/01/23  1500   TP 7.7   ALB 2.8*   GLOB 4.9*   AGRAT 0.6*   AP 71        CBC w/Diff Recent Labs     02/02/23  0500 02/01/23  1500   WBC 21.7* 28.1*   RBC 4.62 5.30   HGB 12.7* 14.8   HCT 40.8 46.0    245   GRANS  --  87*   LYMPH  --  3*   EOS  --  1          Culture data during this hospitalization. All Micro Results       Procedure Component Value Units Date/Time    CULTURE, BLOOD [[id number]] Collected: 02/02/23 0400    Order Status: Canceled Specimen: Blood     CULTURE, [place] STAIN [[id number]] Collected: 02/01/23 1520    Order Status: Completed Specimen: Wound from Abscess Updated: 02/01/23 8203     Special Requests: --        RIGHT  HIP       GRAM STAIN OCCASIONAL WBCS SEEN               2+ Gram positive cocci IN PAIRS           Culture result: PENDING    CULTURE, BLOOD [[id number]] Collected: 02/01/23 1500    Order Status: Sent Specimen: Blood Updated: 02/01/23 1536             LE Doppler 2/2/2023 Interpretation Summary  No evidence of deep vein thrombosis in the right lower extremity. No evidence of deep vein thrombosis in the left common femoral vein.      ECHO       Images report reviewed by me:  CT (Most Recent)  Results from Hospital Encounter encounter on 02/01/23    CT HIP RT W CONT    Narrative  EXAM: CT HIP RT W CONT    INDICATION: Right hip swelling, possible abscess. COMPARISON: None    TECHNIQUE: Helical CT of the right hip with coronal and sagittal reformats. Images reviewed in soft tissue and bone windows. CT dose reduction was achieved  through the use of a standardized protocol tailored for this examination and  automatic exposure control for dose modulation. CONTRAST: Post IV contrast 100 mL Isovue-300    FINDINGS: Bones: Normal bone mineralization. No acute fracture, dislocation,  suspicious bone lesion, or osteomyelitis. Hardware: Lumbar spinal hardware and postsurgical findings are partially imaged. Severe right foraminal stenosis at L5-S1. Joint fluid: None. Articulations: Mild right hip osteoarthritis. No evidence of septic arthritis. Tendons: No full-thickness tendon tear. Muscles: Mild atrophy of the right gluteus cheryl muscle. Inflammation involves  the right sartorius muscle. Soft tissue mass: No mass. Multiple reactive right inguinal lymph nodes. No  drainable fluid collection. Skin thickening and stranding in the subcutaneous  adipose tissues involve the right sartorius muscle. Heterogeneous increased  attenuation in the fat lateral to the sartorius muscle measures 7.9 x 8.3 x 4.0  cm. Impression  Right thigh cellulitis. Ill-defined phlegmon in the proximal, anterior right  thigh involves the right sartorius muscle. No drainable abscess at this time. No  fracture or osteomyelitis. CXR reviewed by me:  XR (Most Recent). CXR   Results from Hospital Encounter encounter on 02/01/23    XR CHEST PORT    Narrative  INDICATION:  meets SIRS criteria    COMPARISON: None    FINDINGS: Single AP portable view of the chest obtained at 1627 demonstrates an  enlarged cardiac silhouette.  The lungs are hypoinspiratory but clear  bilaterally. No osseous abnormalities are seen. Impression  Cardiomegaly. No focal pulmonary process. Please note: Voice-recognition software may have been used to generate this report, which may have resulted in some phonetic-based errors in grammar and contents. Even though attempts were made to correct all the mistakes, some may have been missed, and remained in the body of the document. Dragon software not working well since recent [employer], and care taken as much as possible to correct mistakes.       Cornelius Ferguson MD  2/2/2023

## 2023-02-03 ENCOUNTER — APPOINTMENT (OUTPATIENT)
Dept: ULTRASOUND IMAGING | Age: 53
DRG: 871 | End: 2023-02-03
Attending: INTERNAL MEDICINE
Payer: COMMERCIAL

## 2023-02-03 PROBLEM — L02.415 ABSCESS OF RIGHT THIGH: Status: ACTIVE | Noted: 2023-02-03

## 2023-02-03 LAB
ANION GAP SERPL CALC-SCNC: 10 MMOL/L (ref 3–18)
BACTERIA SPEC CULT: ABNORMAL
BACTERIA SPEC CULT: ABNORMAL
BACTERIA SPEC CULT: NORMAL
BACTERIA SPEC CULT: NORMAL
BUN SERPL-MCNC: 23 MG/DL (ref 7–18)
BUN/CREAT SERPL: 13 (ref 12–20)
CALCIUM SERPL-MCNC: 8.4 MG/DL (ref 8.5–10.1)
CHLORIDE SERPL-SCNC: 101 MMOL/L (ref 100–111)
CK SERPL-CCNC: 65 U/L (ref 39–308)
CO2 SERPL-SCNC: 24 MMOL/L (ref 21–32)
CREAT SERPL-MCNC: 1.8 MG/DL (ref 0.6–1.3)
ERYTHROCYTE [DISTWIDTH] IN BLOOD BY AUTOMATED COUNT: 13.9 % (ref 11.6–14.5)
GLUCOSE BLD STRIP.AUTO-MCNC: 151 MG/DL (ref 70–110)
GLUCOSE BLD STRIP.AUTO-MCNC: 181 MG/DL (ref 70–110)
GLUCOSE BLD STRIP.AUTO-MCNC: 221 MG/DL (ref 70–110)
GLUCOSE SERPL-MCNC: 154 MG/DL (ref 74–99)
GRAM STN SPEC: ABNORMAL
GRAM STN SPEC: ABNORMAL
HCT VFR BLD AUTO: 39.6 % (ref 36–48)
HGB BLD-MCNC: 12.5 G/DL (ref 13–16)
MCH RBC QN AUTO: 27.7 PG (ref 24–34)
MCHC RBC AUTO-ENTMCNC: 31.6 G/DL (ref 31–37)
MCV RBC AUTO: 87.6 FL (ref 78–100)
NRBC # BLD: 0 K/UL (ref 0–0.01)
NRBC BLD-RTO: 0 PER 100 WBC
PLATELET # BLD AUTO: 199 K/UL (ref 135–420)
PMV BLD AUTO: 11.8 FL (ref 9.2–11.8)
POTASSIUM SERPL-SCNC: 3.6 MMOL/L (ref 3.5–5.5)
RBC # BLD AUTO: 4.52 M/UL (ref 4.35–5.65)
SERVICE CMNT-IMP: ABNORMAL
SERVICE CMNT-IMP: NORMAL
SODIUM SERPL-SCNC: 135 MMOL/L (ref 136–145)
WBC # BLD AUTO: 16.6 K/UL (ref 4.6–13.2)

## 2023-02-03 PROCEDURE — 82550 ASSAY OF CK (CPK): CPT

## 2023-02-03 PROCEDURE — P9047 ALBUMIN (HUMAN), 25%, 50ML: HCPCS | Performed by: HOSPITALIST

## 2023-02-03 PROCEDURE — 65270000029 HC RM PRIVATE

## 2023-02-03 PROCEDURE — 74011000258 HC RX REV CODE- 258: Performed by: INTERNAL MEDICINE

## 2023-02-03 PROCEDURE — 97530 THERAPEUTIC ACTIVITIES: CPT

## 2023-02-03 PROCEDURE — 74011250636 HC RX REV CODE- 250/636: Performed by: INTERNAL MEDICINE

## 2023-02-03 PROCEDURE — 97165 OT EVAL LOW COMPLEX 30 MIN: CPT

## 2023-02-03 PROCEDURE — 74011250637 HC RX REV CODE- 250/637: Performed by: FAMILY MEDICINE

## 2023-02-03 PROCEDURE — 77010033678 HC OXYGEN DAILY

## 2023-02-03 PROCEDURE — 74011636637 HC RX REV CODE- 636/637: Performed by: INTERNAL MEDICINE

## 2023-02-03 PROCEDURE — 36415 COLL VENOUS BLD VENIPUNCTURE: CPT

## 2023-02-03 PROCEDURE — 80048 BASIC METABOLIC PNL TOTAL CA: CPT

## 2023-02-03 PROCEDURE — 74011250636 HC RX REV CODE- 250/636: Performed by: EMERGENCY MEDICINE

## 2023-02-03 PROCEDURE — 74011250636 HC RX REV CODE- 250/636: Performed by: HOSPITALIST

## 2023-02-03 PROCEDURE — 77030040393 HC DRSG OPTIFOAM GENT MDII -B

## 2023-02-03 PROCEDURE — 97162 PT EVAL MOD COMPLEX 30 MIN: CPT

## 2023-02-03 PROCEDURE — 74011250637 HC RX REV CODE- 250/637: Performed by: HOSPITALIST

## 2023-02-03 PROCEDURE — 76882 US LMTD JT/FCL EVL NVASC XTR: CPT

## 2023-02-03 PROCEDURE — 2709999900 HC NON-CHARGEABLE SUPPLY

## 2023-02-03 PROCEDURE — 82962 GLUCOSE BLOOD TEST: CPT

## 2023-02-03 PROCEDURE — 85027 COMPLETE CBC AUTOMATED: CPT

## 2023-02-03 RX ORDER — INSULIN GLARGINE 100 [IU]/ML
6 INJECTION, SOLUTION SUBCUTANEOUS DAILY
Status: DISCONTINUED | OUTPATIENT
Start: 2023-02-03 | End: 2023-02-09 | Stop reason: HOSPADM

## 2023-02-03 RX ORDER — VANCOMYCIN/0.9 % SOD CHLORIDE 1.5G/250ML
1500 PLASTIC BAG, INJECTION (ML) INTRAVENOUS EVERY 24 HOURS
Status: DISCONTINUED | OUTPATIENT
Start: 2023-02-03 | End: 2023-02-04

## 2023-02-03 RX ADMIN — PIPERACILLIN AND TAZOBACTAM 3.38 G: 3; .375 INJECTION, POWDER, LYOPHILIZED, FOR SOLUTION INTRAVENOUS at 04:33

## 2023-02-03 RX ADMIN — Medication 6 UNITS: at 11:00

## 2023-02-03 RX ADMIN — HYDROMORPHONE HYDROCHLORIDE 1 MG: 1 INJECTION, SOLUTION INTRAMUSCULAR; INTRAVENOUS; SUBCUTANEOUS at 18:02

## 2023-02-03 RX ADMIN — DORZOLAMIDE HYDROCHLORIDE AND TIMOLOL MALEATE 1 DROP: 20; 5 SOLUTION/ DROPS OPHTHALMIC at 10:00

## 2023-02-03 RX ADMIN — VANCOMYCIN HYDROCHLORIDE 1500 MG: 10 INJECTION, POWDER, LYOPHILIZED, FOR SOLUTION INTRAVENOUS at 14:24

## 2023-02-03 RX ADMIN — DORZOLAMIDE HYDROCHLORIDE AND TIMOLOL MALEATE 1 DROP: 20; 5 SOLUTION/ DROPS OPHTHALMIC at 22:41

## 2023-02-03 RX ADMIN — CEFTRIAXONE 2 G: 2 INJECTION, POWDER, FOR SOLUTION INTRAMUSCULAR; INTRAVENOUS at 12:31

## 2023-02-03 RX ADMIN — ALBUMIN (HUMAN) 25 G: 0.25 INJECTION, SOLUTION INTRAVENOUS at 00:57

## 2023-02-03 RX ADMIN — PANTOPRAZOLE SODIUM 40 MG: 40 TABLET, DELAYED RELEASE ORAL at 07:49

## 2023-02-03 RX ADMIN — Medication 2 UNITS: at 22:39

## 2023-02-03 RX ADMIN — HYDROMORPHONE HYDROCHLORIDE 1 MG: 1 INJECTION, SOLUTION INTRAMUSCULAR; INTRAVENOUS; SUBCUTANEOUS at 22:40

## 2023-02-03 RX ADMIN — HYDROMORPHONE HYDROCHLORIDE 1 MG: 1 INJECTION, SOLUTION INTRAMUSCULAR; INTRAVENOUS; SUBCUTANEOUS at 09:59

## 2023-02-03 RX ADMIN — Medication 2 UNITS: at 16:50

## 2023-02-03 RX ADMIN — ACETAMINOPHEN 650 MG: 325 TABLET ORAL at 14:24

## 2023-02-03 RX ADMIN — ACETAMINOPHEN 650 MG: 325 TABLET ORAL at 19:50

## 2023-02-03 RX ADMIN — HEPARIN SODIUM 5000 UNITS: 5000 INJECTION INTRAVENOUS; SUBCUTANEOUS at 03:48

## 2023-02-03 RX ADMIN — Medication 4 UNITS: at 12:32

## 2023-02-03 RX ADMIN — ACETAMINOPHEN 650 MG: 325 TABLET ORAL at 07:49

## 2023-02-03 RX ADMIN — SODIUM CHLORIDE 125 ML/HR: 9 INJECTION, SOLUTION INTRAVENOUS at 04:32

## 2023-02-03 RX ADMIN — HYDROMORPHONE HYDROCHLORIDE 1 MG: 1 INJECTION, SOLUTION INTRAMUSCULAR; INTRAVENOUS; SUBCUTANEOUS at 02:21

## 2023-02-03 RX ADMIN — HEPARIN SODIUM 5000 UNITS: 5000 INJECTION INTRAVENOUS; SUBCUTANEOUS at 12:32

## 2023-02-03 NOTE — ROUTINE PROCESS
Bedside shift change report given to [address] Marie (oncoming nurse) by Amarilys Dolan RN (offgoing nurse). Report included the following information SBAR, Kardex, Intake/Output, and MAR.

## 2023-02-03 NOTE — PROGRESS NOTES
RENAL CONSULT PROGRESS NOTE   2/3/2023    Patient:  Dorothy Walters  :  1970  Gender:  male  MRN #:  [id number]    Reason for Consult: Acute renal failure     Subjective:  Says he is doing fine, denied chest pain and SOB  Urinating robust  No fever and chills       Objective:    Visit Vitals  /63   Pulse 92   Temp 98.5 °F (36.9 °C)   Resp 17   Ht 5' 9\" (1.753 m)   Wt 149.7 kg (330 lb)   SpO2 96%   BMI 48.73 kg/m²       Physical Exam:    Pt awake,  alert and comfortable  Lung: clear to auscultation  Ext:  NO edema in ankle   Tenderness and swelling in right thigh   CNS- Oriented to time , place and person     Laboratory Data:    Lab Results   Component Value Date    BUN 23 (H) 2023    BUN 32 (H) 2023    BUN 25 (H) 2023     (L) 2023     (L) 2023     2023    CO2 24 2023    CO2 23 2023    CO2 27 2023     Lab Results   Component Value Date    WBC 16.6 (H) 2023    HGB 12.5 (L) 2023    HCT 39.6 2023     No components found for: CALCIUM, PHOSPHORUS, MAGNESIUM  No results found for: HDL  No results found for: SPECIMENTYP, TURBIDITY, UGLU    Imaging Reveiwed:    CT thigh- Right thigh cellulitis. Ill-defined phlegmon in the proximal, anterior right  thigh involves the right sartorius muscle. No drainable abscess at this time. No  fracture or osteomyelitis      Retroperitoneal USG- IMPRESSION  Medical renal disease. No evidence for hydronephrosis. Assessment:  Dorothy Walters is a 46y.o. year old male  hypertension, GERD, sleep apnea, Type 2 diabetes mellitus, chronic back pain  presented with right thigh wound, redness, swelling. He was seen by PCP  and clindamycin started ,For last few days swelling and pain worsening so presented in ED .  He was found to have hypotension, leukocytosis , elevated lactate suggestive of septic shock due to cellulitis     He has been taking NSAIDS for long time and reports Hypertension and DM for long duration   On review of renal trajectory he has mckenna renal function on 1/20/23 with creatinine 1.1 mg /dl   No hydronephrosis in USG     Now he has Acute renal failure with creatinine 3.3 mg/dl on 2/2/23   - this is ATN caused by septic shock   Hypertension  Diabetes  SHAHRIAR   Obesity   Septic shock - improving       Plan:    Robust urine output and decent improvement in renal function .   Agree to stop normal saline infusion   - closely watch urine output , if he continues to have such polyuria, needs work up and volume infusion to prevent volume depletion   Encourage water intake   Treatment of Cellulitis per ICU and ID  Strict intake and output recording , especially urine output   Ensure that patient does not retain urine   Bladder scan daily prn   Avoid NSAIDS, contrast , nephrotoxin   Dose all medicine and antibiotics  for current eGFR     Hypertension: was taking metoprolol, verapamil , HCTZ plus triamterene  For now hold antihypertensive             Bonnie Escobar MD, MD  [facility], [facility].- Nephrology

## 2023-02-03 NOTE — PROGRESS NOTES
[facility] Pharmacokinetic Monitoring Service - Vancomycin    Consulting Provider: Dr. Anson Bernal   Indication: SSTI   Target Concentration: Goal AUC/TIMOTHY 400-600 mg*hr/L  Day of Therapy: 3  Additional Antimicrobials: Zosyn     Pertinent Laboratory Values: Wt Readings from Last 1 Encounters:   02/02/23 149.7 kg (330 lb)     Temp Readings from Last 1 Encounters:   02/03/23 98.5 °F (36.9 °C)     No components found for: PROCAL  Estimated Creatinine Clearance: 69.5 mL/min (A) (based on SCr of 1.8 mg/dL (H)).   Recent Labs     02/03/23  0412 02/02/23  0500   WBC 16.6* 21.7*     Plan:  Vancomycin current dose 750 mg IV q24h ==> SCr significantly improved to 1.8 today   Predicted  mg/l.h ( sub-therapeutic )   Will increase Vancomycin to 1500 mg IV q24h   Predicted  mg/l.h and trough 16.1 mg/l   Repeat vancomycin concentration ordered for 2/4/23 with am labs    Pharmacy will continue to monitor patient and adjust therapy as indicated    JENNIFER Rodriguez [facility] [place], [facility]   2/3/2023 9:50 AM

## 2023-02-03 NOTE — PROGRESS NOTES
Problem: Self Care Deficits Care Plan (Adult)  Goal: *Acute Goals and Plan of Care (Insert Text)  Description: Occupational Therapy Goals  Initiated 2/3/2023 within 7 day(s). 1.  Patient will perform grooming with supervision/set-up standing at sink for 5 minutes or more. 2.  Patient will perform upper body dressing with independence. 3.  Patient will perform lower body dressing with minimal assistance/contact guard assist.  4.  Patient will perform toilet transfers with minimal assistance/contact guard assist.  5.  Patient will perform all aspects of toileting with minimal assistance/contact guard assist.  6.  Patient will participate in upper extremity therapeutic exercise/activities with supervision/set-up for 10 minutes. 7.  Patient will utilize energy conservation techniques during functional activities with verbal, visual, and tactile cues. OCCUPATIONAL THERAPY EVALUATION    Patient: Cruz Mcclendon (21 y.o. male)  Date: 2/3/2023  Primary Diagnosis: Sepsis ([facility] [address] [A41.9]  Cellulitis [L03.90]  Hypotension [I95.9]  VIKTOR (acute kidney injury) ([facility] [address] [N17.9]       Precautions: ***  Fall  PLOF: ***    ASSESSMENT :  Based on the objective data described below, the patient presents with ***. Patient will benefit from skilled intervention to address the above impairments.   Patient's rehabilitation potential is considered to be {Potential:07661:\"***\"}  Factors which may influence rehabilitation potential include: ***  []             None noted  []             Mental ability/status  []             Medical condition  []             Home/family situation and support systems  []             Safety awareness  []             Pain tolerance/management  []             Other:      PLAN :  Recommendations and Planned Interventions: ***  []               Self Care Training                  []      Therapeutic Activities  []               Functional Mobility Training   []      Cognitive Retraining  [] Therapeutic Exercises           []      Endurance Activities  []               Balance Training                    []      Neuromuscular Re-Education  []               Visual/Perceptual Training     []      Home Safety Training  []               Patient Education                   []      Family Training/Education  []               Other (comment):    Frequency/Duration: Patient will be followed by occupational therapy {FREQUENCY:32812::1-2 times per day/4-7 days per week} to address goals. Discharge Recommendations: {AFTER XSHT:08358063}  Further Equipment Recommendations for Discharge: { :90428::N/A}    AMPAC: ***    This AMPAC score should be considered in conjunction with interdisciplinary team recommendations to determine the most appropriate discharge setting. Patient's social support, diagnosis, medical stability, and prior level of function should also be taken into consideration.      SUBJECTIVE:   Patient stated ***.    OBJECTIVE DATA SUMMARY:     Past Medical History:   Diagnosis Date    Arthritis     spine    Chronic pain     lumbar    GERD (gastroesophageal reflux disease)     Sleep apnea     CPAP in the past, was 300 lbs, lost weight     Past Surgical History:   Procedure Laterality Date    HX ORTHOPAEDIC  1990    right ankle surgery     Barriers to Learning/Limitations: {barriers to learning/limitations:06192:a}  Compensate with: visual, verbal, tactile, kinesthetic cues/model    Home Situation:   Home Situation  Home Environment: Private residence  # Steps to Enter: 5  Rails to Enter: Yes  Hand Rails : Bilateral  Wheelchair Ramp: No  One/Two Story Residence: Two story, live on 1st floor  Living Alone: No  Support Systems: Spouse/Significant Other  Patient Expects to be Discharged to[de-identified] Home with family assistance  Current DME Used/Available at Home: Wheelchair, Walker, rolling, Commode, bedside  Tub or Shower Type: Tub/Shower combination  []  Right hand dominant   []  Left hand dominant    Cognitive/Behavioral Status:  Neurologic State: Alert  Orientation Level: Oriented X4  Cognition: Appropriate for age attention/concentration; Follows commands  Safety/Judgement: Fall prevention    Skin: ***  Edema: ***    Vision/Perceptual:    Tracking: Able to track stimulus in all quadrants w/o difficulty                                Coordination: BUE     Fine Motor Skills-Upper: Left Intact; Right Intact    Gross Motor Skills-Upper: Left Intact; Right Intact    Balance:  Sitting: Intact  Standing: Impaired; With support  Standing - Static: Fair  Standing - Dynamic : Fair    Strength: BUE  ***  Strength: Generally decreased, functional                Tone & Sensation: BUE  ***  Tone: Normal  Sensation: Intact                      Range of Motion: BUE  ***  AROM: Generally decreased, functional                         Functional Mobility and Transfers for ADLs:  Bed Mobility:        Sit to Supine: Moderate assistance  Scooting: Minimum assistance;Contact guard assistance  Transfers:  Sit to Stand: Minimum assistance;Contact guard assistance  Stand to Sit: Contact guard assistance                                 ADL Assessment:   Feeding: Independent    Oral Facial Hygiene/Grooming: Contact guard assistance         Upper Body Dressing: Supervision    Lower Body Dressing: Moderate assistance    Toileting: Moderate assistance                ADL Intervention:                                     Cognitive Retraining  Safety/Judgement: Fall prevention    Therapeutic Exercise:  ***    Pain:  Pain level pre-treatment: ***/10   Pain level post-treatment: ***/10   Pain Intervention(s): Medication (see MAR); Rest, Ice, Repositioning***   Response to intervention: Nurse notified, See doc flow***    Activity Tolerance:   ***  Please refer to the flowsheet for vital signs taken during this treatment.   After treatment:   [] Patient left in no apparent distress sitting up in chair  [] Patient left in no apparent distress in bed  [] Call bell left within reach  [] Nursing notified  [] Caregiver present  [] Bed alarm activated    COMMUNICATION/EDUCATION:   [] Role of Occupational Therapy in the acute care setting  [] Home safety education was provided and the patient/caregiver indicated understanding. [] Patient/family have participated as able in goal setting and plan of care. [] Patient/family agree to work toward stated goals and plan of care. [] Patient understands intent and goals of therapy, but is neutral about his/her participation. [] Patient is unable to participate in goal setting and plan of care. Thank you for this referral.  Fabiana Knox OT  Time Calculation: 18 mins    Eval Complexity: History: {OT IP History:88883}; Examination: {OT IP Examination:82511}; Decision Making:{[facility] Decision [facility] Daily Activity Inpatient Short Form (6-Clicks)*    How much HELP from another person does the patient currently need    (If the patient hasn't done an activity recently, how much help from another person do you think he/she would need if he/she tried?)   Total (Total A or Dep)   A Lot  (Mod to Max A)   A Little (Sup or Min A)   None (Mod I to I)   Putting on and taking off regular lower body clothing? [] 1 [] 2 [] 3 [] 4   2. Bathing (including washing, rinsing,      drying)? [] 1 [] 2 [] 3 [] 4   3. Toileting, which includes using toilet, bedpan or urinal?   [] 1 [] 2 [] 3 [] 4   4. Putting on and taking off regular upper body clothing? [] 1 [] 2 [] 3 [] 4   5. Taking care of personal grooming such as brushing teeth? [] 1 [] 2 [] 3 [] 4   6. Eating meals? [] 1 [] 2 [] 3 [] 4     Based on an AM-PAC score of **/24 and their current ADL deficits; it is recommended that the patient have 5-7 sessions per week of Occupational Therapy at d/c to increase the patient's independence.   Currently, this patient demonstrates the potential endurance, and/or tolerance for 3 hours of therapy each day at d/c. Based on an AM-PAC score of **/24 and their current ADL deficits; it is recommended that the patient have 3-5 sessions per week of Occupational Therapy at d/c to increase the patient's independence. Based on an AM-PAC score of **/24 and their current ADL deficits; it is recommended that the patient have 2-3 sessions per week of Occupational Therapy at d/c to increase the patient's independence. At this time and based on an AM-PAC score of **/24, no further OT is recommended upon discharge due to (i.e. patient at baseline functional statusetc). Recommend patient returns to prior setting with prior services.

## 2023-02-03 NOTE — ROUTINE PROCESS
Bedside shift change report given to Veronique Horner RN (oncoming nurse) by Corine Null RN (offgoing nurse). Report included the following information SBAR, Kardex, Intake/Output, and MAR.

## 2023-02-03 NOTE — PROGRESS NOTES
Problem: Falls - Risk of  Goal: *Absence of Falls  Description: Document Radha Hightower Fall Risk and appropriate interventions in the flowsheet.   Outcome: Progressing Towards Goal  Note: Fall Risk Interventions:            Medication Interventions: Bed/chair exit alarm, Evaluate medications/consider consulting pharmacy

## 2023-02-03 NOTE — PROGRESS NOTES
1915 - Care received and shift assessment complete. Sitting at side of bed, 2L nasal canula in place, NAD. Mother at bedside. 0000 - Reassessment complete, no new changes. 0340 - Reassessment complete, no new changes.

## 2023-02-03 NOTE — PROGRESS NOTES
Problem: Mobility Impaired (Adult and Pediatric)  Goal: *Acute Goals and Plan of Care (Insert Text)  Description: Physical Therapy Goals  Initiated 2/3/2023 and to be accomplished within 7 day(s)  1. Patient will move from supine to sit and sit to supine  in bed with supervision/set-up. 2.  Patient will transfer from bed to chair and chair to bed with modified independence using the least restrictive device. 3.  Patient will perform sit to stand with modified independence. 4.  Patient will ambulate with modified independence for 200 feet with the least restrictive device. 5.  Patient will ascend/descend 5 stairs with 2 handrail(s) with modified independence. Outcome: Progressing Towards Goal     PHYSICAL THERAPY EVALUATION    Patient: Oswaldo Cisneros (19 y.o. male)  Date: 2/3/2023  Primary Diagnosis: Sepsis ([address] [A41.9]  Cellulitis [L03.90]  Hypotension [I95.9]  VIKTOR (acute kidney injury) ([address] [N17.9]  Precautions:   Fall  PLOF: Independent w/o AD    ASSESSMENT :  Based on the objective data described below, the patient is seen on 3S unit. Upon entering the room, the pt was found seated EOB, IV in RUE. During assessment, pt is noted with decreased strength of the RLE>LLE, deficits with bed mobility and transfers, and gait abnormalities as documented below. Pt requires Mod A with sit to supine for LE management. During gait trial, pt required CGA/RW/vc for safety and RW placement, 4ft in room. Pt felt \"a head rush\" and was returned to sitting EOB, /71. Prior to leaving, pt was left with all needs in reach, returned to reclined in bed and nurse [facility] notified of pt condition post encounter. Pt would benefit from skilled therapy services to address listed deficits and to improve activity tolerance in order to safely return to home environment or other discharge arrangements. Consider home with HHPT for discharge arrangements.     Pt Education: Role of physical therapy in acute care setting, fall prevention and safety/technique during functional mobility tasks    Patient's rehabilitation potential is considered to be Fair  Factors which may influence rehabilitation potential include:   []         None noted  []         Mental ability/status  [x]         Medical condition  []         Home/family situation and support systems  []         Safety awareness  [x]         Pain tolerance/management  []         Other:        PLAN :  Recommendations and Planned Interventions:   [x]           Bed Mobility Training             []    Neuromuscular Re-Education  [x]           Transfer Training                   []    Orthotic/Prosthetic Training  [x]           Gait Training                          []    Modalities  [x]           Therapeutic Exercises           []    Edema Management/Control  [x]           Therapeutic Activities            []    Family Training/Education  [x]           Patient Education  []           Other (comment):    Frequency/Duration: Patient will be followed by physical therapy 1-2 times per day/4-7 days per week to address goals. Discharge Recommendations: Home Physical Therapy  Further Equipment Recommendations for Discharge: shower chair (Pt request since he sits at edge of tub during bathing.)    AMPAC: 15/20    This AMPAC score should be considered in conjunction with interdisciplinary team recommendations to determine the most appropriate discharge setting. Patient's social support, diagnosis, medical stability, and prior level of function should also be taken into consideration. SUBJECTIVE:   Patient stated My right leg feels so tight.     OBJECTIVE DATA SUMMARY:     Past Medical History:   Diagnosis Date    Arthritis     spine    Chronic pain     lumbar    GERD (gastroesophageal reflux disease)     Sleep apnea     CPAP in the past, was 300 lbs, lost weight     Past Surgical History:   Procedure Laterality Date    HX ORTHOPAEDIC  1990    right ankle surgery     Barriers to Learning/Limitations: None  Compensate with: N/A  Home Situation:  Home Situation  Home Environment: Private residence  # Steps to Enter: 5  Rails to Enter: Yes  Hand Rails : Bilateral  Wheelchair Ramp: No  One/Two Story Residence: Two story, live on 1st floor  Living Alone: No  Support Systems: Spouse/Significant Other  Patient Expects to be Discharged to[de-identified] Home with family assistance  Current DME Used/Available at Home: Wheelchair, Commode, bedside  Tub or Shower Type: Tub/Shower combination  Critical Behavior:  Neurologic State: Alert  Orientation Level: Oriented X4  Cognition: Appropriate decision making  Psychosocial  Patient Behaviors: Calm; Cooperative  Family  Behaviors: Calm; Cooperative  Visitor Behaviors: Calm  Needs Expressed: Educational  Purposeful Interaction: Yes  Pt Identified Daily Priority: Clinical issues (comment) (pain meds; orient to room)  Caritas Process: Establish trust;Attend basic human needs  Caring Interventions: Reassure  Reassure: Quiet presence;Caring rounds  Therapeutic Modalities: Humor  Skin Condition/Temp: Hot;Rash (R anterio-lateral thigh)  Family  Behaviors: Calm; Cooperative  Skin Integrity: Wound (add Wound LDA); Other (comment) (right hip cellulitis, swelling, open wound)  Skin Integumentary  Skin Color: Appropriate for ethnicity  Skin Condition/Temp: Hot;Rash (R anterio-lateral thigh)  Skin Integrity: Wound (add Wound LDA); Other (comment) (right hip cellulitis, swelling, open wound)  Turgor: Non-tenting  Strength:    Strength: Generally decreased, functional  R>L limited, LLE WFL  Tone & Sensation:   Tone: Normal  Sensation: Intact  Range Of Motion:  AROM: Generally decreased, functional  R>L limited, LLE WFL  Functional Mobility:  Bed Mobility:  Sit to Supine: Moderate assistance (LE management)  Scooting: Contact guard assistance  Transfers:  Sit to Stand: Contact guard assistance  Stand to Sit: Supervision  Balance:   Sitting: Intact  Standing: Impaired; With support  Standing - Static: Fair  Standing - Dynamic : Fair  Ambulation/Gait Training:  Distance (ft): 4 Feet (ft)  Assistive Device: Gait belt;Walker, rolling  Ambulation - Level of Assistance: Contact guard assistance  Gait Description (WDL): Exceptions to WDL  Gait Abnormalities: Step to gait  Base of Support: Narrowed  Stance: Right decreased  Speed/Denice: Slow  Step Length: Right shortened  Interventions: Safety awareness training;Verbal cues  Pain:  Pain level pre-treatment: 10/10   Pain level post-treatment: 10/10   Pain Intervention(s) : Medication (see MAR); Rest, Ice, Repositioning  Response to intervention: Nurse notified, See doc flow  Activity Tolerance:   Fair  Please refer to the flowsheet for vital signs taken during this treatment. After treatment:   []         Patient left in no apparent distress sitting up in chair  [x]         Patient left in no apparent distress in bed  [x]         Call bell left within reach  [x]         Nursing notified  []         Caregiver present  []         Bed alarm activated  []         SCDs applied    COMMUNICATION/EDUCATION:   [x]         Role of Physical Therapy in the acute care setting. [x]         Fall prevention education was provided and the patient/caregiver indicated understanding. [x]         Patient/family have participated as able in goal setting and plan of care. [x]         Patient/family agree to work toward stated goals and plan of care. []         Patient understands intent and goals of therapy, but is neutral about his/her participation. []         Patient is unable to participate in goal setting/plan of care: ongoing with therapy staff.  []         Other:     Thank you for this referral.  Catina Dutta, SPT   Time Calculation: 32 mins      Eval Complexity: History: MEDIUM  Complexity : 1-2 comorbidities / personal factors will impact the outcome/ POC Exam:MEDIUM Complexity : 3 Standardized tests and measures addressing body structure, function, activity limitation and / or participation in recreation  Presentation: MEDIUM Complexity : Evolving with changing characteristics  Clinical Decision Making:Medium Complexity    Overall Complexity:MEDIUM      [employer] AM-PAC® Basic Mobility Inpatient Short Form (6-Clicks) Version 2    How much HELP from another person does the patient currently need    (If the patient hasn't done an activity recently, how much help from another person do you think he/she would need if he/she tried?)   Total (Total A or Dep)   A Lot  (Mod to Max A)   A Little (Sup or Min A)   None (Mod I to I)   Turning from your back to your side while in a flat bed without using bedrails? [] 1 [] 2 [x] 3 [] 4   2. Moving from lying on your back to sitting on the side of a flat bed without using bedrails? [] 1 [] 2 [x] 3 [] 4   3. Moving to and from a bed to a chair (including a wheelchair)? [] 1 [] 2 [x] 3 [] 4   4. Standing up from a chair using your arms (e.g., wheelchair, or bedside chair)? [] 1 [] 2 [x] 3 [] 4   5. Walking in hospital room? [] 1 [] 2 [x] 3 [] 4   6. Climbing 3-5 steps with a railing?+   [] 1 [] 2 [] 3 [] 4   +If stair climbing cannot be assessed, skip item #6. Sum responses from items 1-5. Based on an AM-PAC score of **/24 (or 15/20 if omitting stairs) and their current functional mobility deficits, it is recommended that the patient have 2-3 sessions per week of Physical Therapy at d/c to increase the patient's independence.

## 2023-02-03 NOTE — PROGRESS NOTES
[facility]  Pulmonary, Critical Care, and Sleep Medicine    Name: Amarjit Neumann MRN: [id number]   : 1970 Hospital: [facility] [place], [place]    Date: 2/3/2023  Room: [address] [place] [address] Note                                              Consult requesting physician: Dr. Hilario Connelly  Reason for Consult: Septic shock       IMPRESSION:   Septic shock  Right thigh cellulitis  Acute renal failure  Lactic acidosis  Type 2 diabetes mellitus  SHAHRIAR-not using CPAP  Morbid obesity    Patient Active Problem List   Diagnosis Code    Lumbar stenosis M48.061    Cellulitis L03.90    Hypotension I95.9    Sepsis ([address] A41.9    VIKTOR (acute kidney injury) ([address] N17.9    Septic shock ([address] A41.9, R65.21    SHAHRIAR (obstructive sleep apnea) G47.33    BMI 45.0-49.9, adult ([address] Z68.42    Controlled type 2 diabetes mellitus, without long-term current use of insulin ([address] E11.9    Lactic acidosis E87.20         Code status: Full code      RECOMMENDATIONS:   Patient with type 2 diabetes and obesity, presenting with right thigh cellulitis and septic shock. Respiratory: Stable respirations; on room air. History of SHAHRIAR, but not able to use CPAP therapy in the past; discussed about CPAP therapy use while in the hospital at nighttime, but patient declined. Chest x-ray on admission -cardiomegaly; no focal infiltrates or consolidation; no pleural effusions. Keep SPO2 >=92%. HOB 30 degree elevation all the time. Aspiration precautions. Incentive spirometry. CVS: Stable hemodynamics; patient off Levophed. Echo-normal LV function; no pulmonary hypertension reported. Ultrasound right leg-negative for DVT. ID: Blood cultures is negative; wound culture-Streptococcus. Antibiotics-Zosyn and IV vancomycin. Afebrile; leukocytosis appears to be improving. ID on case. Deescalate antibiotic when appropriate. Hematology/Oncology: Monitor hemoglobin and platelets; no active bleeding issues.   Renal: Monitor renal function and urine output. Ultrasound kidneys-No hydronephrosis. Creatinine improved to 1.80 with fluids. Nephrology on case. GI: No active issues. Endocrine: Monitor BS-mildly elevated; add Lantus insulin 6 units daily. Continue sliding scale insulin. Neurology: Normal mentation. Pain/Sedation: Prn Dilaudid for thigh and low back pains. Skin/Wound: Wound care consult. Electrolytes: Replace electrolytes per ICU electrolyte replacement protocol; caution renal failure. IVF: Discontinued-encouraged oal hydration; improved renal function. Nutrition: Oral ADA diet. Prophylaxis: DVT Prophylaxis: Sc heparin. GI Prophylaxis: Protonix. Lines/Tubes: PIVs, midlines  Quality Care: PPI, DVT prophylaxis, HOB elevated, Infection control all reviewed and addressed. Care of plan d/w RN. D/w patient, and updated management plans in detail. (answered all questions to satisfaction). Patient likely can be discharged out of ICU today; I would sign off-please call if needed. High complexity decision making was performed during the evaluation of this patient at high risk for decompensation with multiple organ involvement. Total critical care time spent rendering care exclusive of procedures/family discussion/coordination of care: 35 minutes. Subjective/History of Present Illness:     Patient is a 46 y.o. male with PMHx significant for type 2 diabetes mellitus, SHAHRIAR, but not using CPAP therapy due to intolerance, obesity, lumbar surgeries with chronic lumbar pains. Patient came to the ER yesterday with right thigh cellulitis and septic shock. Patient reports history of oral infection about 4 to 5 weeks ago, which resolved. Subsequently, a week later he fell on his right thigh but did not note any injuries. He noticed pain, and progressive swelling and inflammation in the next few days. Subsequently, he started seeing ulceration in the right upper thigh laterally with some drainage.     2/3/2023  Patient seen in ICU.  No acute issues overnight. Mild pain right thigh and low back. Breathing-normal.  No chest pain or palpitations. Afebrile; blood pressure-stable; urine output-3.5 L last night. Drips-normal saline 125 ml per hour      Review of Systems:  - No fever or chills  - No chest pain or palpitations  - No cough or hemoptysis  - No vomiting or diarrhea      No Known Allergies   Past Medical History:   Diagnosis Date    Arthritis     spine    Chronic pain     lumbar    GERD (gastroesophageal reflux disease)     Sleep apnea     CPAP in the past, was 300 lbs, lost weight      Past Surgical History:   Procedure Laterality Date    HX ORTHOPAEDIC  1990    right ankle surgery      Social History     Tobacco Use    Smoking status: Never    Smokeless tobacco: Never   Substance Use Topics    Alcohol use: Yes     Alcohol/week: 5.0 standard drinks     Types: 5 Shots of liquor per week      No family history on file. Prior to Admission medications    Medication Sig Start Date End Date Taking? Authorizing Provider   sildenafil citrate (VIAGRA) 100 mg tablet Take 100 mg by mouth two (2) times a day. Yes Provider, Historical   metoprolol tartrate (LOPRESSOR) 100 mg IR tablet Take 100 mg by mouth two (2) times a day. Yes Provider, Historical   ezetimibe (ZETIA) 10 mg tablet Take 10 mg by mouth daily. Yes Provider, Historical   rosuvastatin (CRESTOR) 40 mg tablet Take 40 mg by mouth nightly. Yes Provider, Historical   latanoprost (XALATAN) 0.005 % ophthalmic solution Administer 1 Drop to both eyes nightly. Yes Provider, Historical   metFORMIN ER (GLUCOPHAGE XR) 500 mg tablet Take 500 mg by mouth daily (with dinner). Yes Provider, Historical   verapamiL (CALAN) 120 mg tablet Take 120 mg by mouth three (3) times daily. Only been taking 2x daily, took 1 tab this morning   Yes Provider, Historical   buPROPion XL (WELLBUTRIN XL) 300 mg XL tablet Take 300 mg by mouth daily.    Yes Provider, Historical   clindamycin (CLEOCIN) 300 mg capsule Take 300 mg by mouth three (3) times daily. Has 100 mg bottle will capsules at bedside as well to make a total of 400 mg   Yes Provider, Historical   oxyCODONE-acetaminophen (PERCOCET 10)  mg per tablet Take 1 Tab by mouth every six (6) hours as needed for Pain. Yes Provider, Historical   lisinopril (PRINIVIL, ZESTRIL) 40 mg tablet Take 40 mg by mouth daily. Patient not taking: Reported on 2023    Provider, Historical   amLODIPine (NORVASC) 5 mg tablet Take 5 mg by mouth daily. Patient not taking: Reported on 2023    Provider, Historical   phenylephrine (NEOSYNEPHRINE) 1 % spry 2 Sprays by Both Nostrils route every six (6) hours as needed.   Patient not taking: Reported on 2023    Provider, Historical     Current Facility-Administered Medications   Medication Dose Route Frequency    piperacillin-tazobactam (ZOSYN) 3.375 g in 0.9% sodium chloride (MBP/ADV) 100 mL MBP  3.375 g IntraVENous Q8H    insulin lispro (HUMALOG) injection   SubCUTAneous AC&HS    dorzolamide-timoloL (COSOPT) 22.3-6.8 mg/mL ophthalmic solution 1 Drop  1 Drop Both Eyes BID    Vanocmycin - Rx to dose and monitor  1 Each Other Rx Dosing/Monitoring    vancomycin (VANCOCIN) 750 mg in 0.9% sodium chloride 250 mL (VIAL-MATE)  750 mg IntraVENous Q24H    0.9% sodium chloride infusion  125 mL/hr IntraVENous CONTINUOUS    heparin (porcine) injection 5,000 Units  5,000 Units SubCUTAneous Q8H    pantoprazole (PROTONIX) tablet 40 mg  40 mg Oral ACB    NOREPINephrine (LEVOPHED) 16 mg in 0.9% sodium chloride 250 mL infusion  0.5-30 mcg/min IntraVENous TITRATE         Objective:   Vital Signs:    Visit Vitals  BP (!) 146/70   Pulse 97   Temp 98.5 °F (36.9 °C)   Resp 29   Ht 5' 9\" (1.753 m)   Wt 149.7 kg (330 lb)   SpO2 99%   BMI 48.73 kg/m²       O2 Device: Nasal cannula   O2 Flow Rate (L/min): 2 l/min   Temp (24hrs), Av.9 °F (36.6 °C), Min:97.7 °F (36.5 °C), Max:98.5 °F (36.9 °C)       Intake/Output:   Last shift:      No intake/output data recorded.     Last 3 shifts: 02/01 1901 - 02/03 0700  In: 5733.4 [P.O.:960; I.V.:4773.4]  Out: 5850 [Urine:5850]      Intake/Output Summary (Last 24 hours) at 2/3/2023 0914  Last data filed at 2/3/2023 0917  Gross per 24 hour   Intake 4123.46 ml   Output 4750 ml   Net -626.54 ml         Last 3 Recorded Weights in this Encounter    02/01/23 1438 02/02/23 1002 02/02/23 1228   Weight: 149.7 kg (330 lb) 149.7 kg (330 lb) 149.7 kg (330 lb)        Physical Exam:   Comfortable; on room air; obese; acyanotic  HEENT: pupils not dilated, no scleral jaundice, moist oral mucosa  Neck: No adenopathy or thyroid swelling  CVS: Normal heart sounds; S1 and S2 with no murmur; JVD not elevated; telemetry-sinus rhythm   RS: Good air entry bilateral; no wheezing or crackles; normal respirations   Abd: Soft, nontender, not distended, obese abdomen  Neuro: non focal, awake, alert  Extrm: no leg edema or swelling or clubbing; right upper thigh erythematous and less swollen  Skin: no rash; right upper thigh small ulcer laterally  Lymphatic: no cervical or supraclavicular adenopathy  Psych: Cooperative        Data:       Recent Results (from the past 24 hour(s))   GLUCOSE, POC    Collection Time: 02/02/23 11:10 AM   Result Value Ref Range    Glucose (POC) 154 (H) 70 - 110 mg/dL   SODIUM, UR, RANDOM    Collection Time: 02/02/23 11:55 AM   Result Value Ref Range    Sodium,urine random 90 20 - 110 MMOL/L   CREATININE, UR, RANDOM    Collection Time: 02/02/23 11:55 AM   Result Value Ref Range    Creatinine, urine random 55.00 30 - 125 mg/dL   ECHO ADULT COMPLETE    Collection Time: 02/02/23 12:34 PM   Result Value Ref Range    IVSd 1.5 (A) 0.6 - 1.0 cm    LVIDd 5.3 4.2 - 5.9 cm    LVIDs 3.7 cm    LVOT Diameter 2.2 cm    LVPWd 1.5 (A) 0.6 - 1.0 cm    EF BP 58 55 - 100 %    LV Ejection Fraction A2C 63 %    LV Ejection Fraction A4C 55 %    LV EDV A2C 114 mL    LV EDV A4C 156 mL    LV EDV  67 - 155 mL    LV ESV A2C 42 mL    LV ESV A4C 70 mL    LV ESV BP 57 22 - 58 mL    LVOT Peak Gradient 5 mmHg    LVOT Mean Gradient 3 mmHg    LVOT SV 84.3 ml    LVOT Peak Velocity 1.2 m/s    LVOT VTI 22.2 cm    RVIDd 3.6 cm    RV Free Wall Peak S' 24 cm/s    LA Diameter 4.3 cm    LA Volume A/L 76 mL    LA Volume 2C 57 18 - 58 mL    LA Volume 4C 73 (A) 18 - 58 mL    LA Volume BP 68 (A) 18 - 58 mL    AV Area by Peak Velocity 2.4 cm2    AV Area by VTI 2.8 cm2    AV Peak Gradient 14 mmHg    AV Mean Gradient 7 mmHg    AV Peak Velocity 1.9 m/s    AV Mean Velocity 1.3 m/s    AV VTI 30.5 cm    MV A Velocity 0.77 m/s    MV E Wave Deceleration Time 151.4 ms    MV E Velocity 0.95 m/s    LV E' Lateral Velocity 14 cm/s    LV E' Septal Velocity 9 cm/s    PV Peak Gradient 4 mmHg    PV Max Velocity 1.1 m/s    TAPSE 2.9 1.7 cm    Aortic Root 3.4 cm    Fractional Shortening 2D 30 28 - 44 %    LV ESV Index BP 22 mL/m2    LV EDV Index BP 52 mL/m2    LV ESV Index A4C 27 mL/m2    LV EDV Index A4C 61 mL/m2    LV ESV Index A2C 16 mL/m2    LV EDV Index A2C 45 mL/m2    LVIDd Index 2.07 cm/m2    LVIDs Index 1.45 cm/m2    LV RWT Ratio 0.57     LV Mass 2D 352.5 (A) 88 - 224 g    LV Mass 2D Index 137.7 (A) 49 - 115 g/m2    MV E/A 1.23     E/E' Ratio (Averaged) 8.67     E/E' Lateral 6.79     E/E' Septal 10.56     LA Volume Index BP 27 16 - 34 ml/m2    LA Volume Index A/L 30 16 - 34 mL/m2    LVOT Stroke Volume Index 32.9 mL/m2    LVOT Area 3.8 cm2    LA Volume Index 2C 22 16 - 34 mL/m2    LA Volume Index 4C 29 16 - 34 mL/m2    LA Size Index 1.68 cm/m2    LA/AO Root Ratio 1.26     Ao Root Index 1.33 cm/m2    AV Velocity Ratio 0.63     LVOT:AV VTI Index 0.73     HENNY/BSA VTI 1.1 cm2/m2    HENNY/BSA Peak Velocity 0.9 cm2/m2   GLUCOSE, POC    Collection Time: 02/02/23  4:29 PM   Result Value Ref Range    Glucose (POC) 201 (H) 70 - 110 mg/dL   GLUCOSE, POC    Collection Time: 02/02/23  9:27 PM   Result Value Ref Range    Glucose (POC) 167 (H) 70 - 237 mg/dL   METABOLIC PANEL, BASIC Collection Time: 02/03/23  4:12 AM   Result Value Ref Range    Sodium 135 (L) 136 - 145 mmol/L    Potassium 3.6 3.5 - 5.5 mmol/L    Chloride 101 100 - 111 mmol/L    CO2 24 21 - 32 mmol/L    Anion gap 10 3.0 - 18 mmol/L    Glucose 154 (H) 74 - 99 mg/dL    BUN 23 (H) 7.0 - 18 MG/DL    Creatinine 1.80 (H) 0.6 - 1.3 MG/DL    BUN/Creatinine ratio 13 12 - 20      eGFR 45 (L) >60 ml/min/1.73m2    Calcium 8.4 (L) 8.5 - 10.1 MG/DL         Chemistry Recent Labs     02/03/23  0412 02/02/23  0500 02/01/23  1500   * 140* 184*   * 135* 136   K 3.6 3.9 4.1    103 99*   CO2 24 23 27   BUN 23* 32* 25*   CREA 1.80* 3.33* 3.26*   CA 8.4* 8.2* 9.6   AGAP 10 9 10   BUCR 13 10* 8*   AP  --   --  71   TP  --   --  7.7   ALB  --   --  2.8*   GLOB  --   --  4.9*   AGRAT  --   --  0.6*          Lactic Acid Lactic acid   Date Value Ref Range Status   02/02/2023 1.5 0.4 - 2.0 MMOL/L Final     Recent Labs     02/02/23  0015 02/01/23  1500   LAC 1.5 3.3*          Liver Enzymes Protein, total   Date Value Ref Range Status   02/01/2023 7.7 6.4 - 8.2 g/dL Final     Albumin   Date Value Ref Range Status   02/01/2023 2.8 (L) 3.4 - 5.0 g/dL Final     Globulin   Date Value Ref Range Status   02/01/2023 4.9 (H) 2.0 - 4.0 g/dL Final     A-G Ratio   Date Value Ref Range Status   02/01/2023 0.6 (L) 0.8 - 1.7   Final     Alk. phosphatase   Date Value Ref Range Status   02/01/2023 71 45 - 117 U/L Final     Recent Labs     02/01/23  1500   TP 7.7   ALB 2.8*   GLOB 4.9*   AGRAT 0.6*   AP 71          CBC w/Diff Recent Labs     02/02/23  0500 02/01/23  1500   WBC 21.7* 28.1*   RBC 4.62 5.30   HGB 12.7* 14.8   HCT 40.8 46.0    245   GRANS  --  87*   LYMPH  --  3*   EOS  --  1            Culture data during this hospitalization.    All Micro Results       Procedure Component Value Units Date/Time    CULTURE, BLOOD [[id number]] Collected: 02/01/23 1500    Order Status: Completed Specimen: Blood Updated: 02/03/23 0717     Special Requests: NO SPECIAL REQUESTS        Culture result: NO GROWTH 2 DAYS       CULTURE, MRSA [[id number]] Collected: 02/02/23 1408    Order Status: Sent Specimen: Nasal from Nares Updated: 02/02/23 2314    CULTURE, Blanca Kirkland STAIN [[id number]]  (Abnormal) Collected: 02/01/23 1520    Order Status: Completed Specimen: Wound from Abscess Updated: 02/02/23 1410     Special Requests: --        RIGHT  HIP       GRAM STAIN OCCASIONAL WBCS SEEN               2+ Gram positive cocci IN PAIRS           Culture result:       HEAVY PROBABLE Streptococcus species          CULTURE, BLOOD [[id number]] Collected: 02/02/23 0400    Order Status: Canceled Specimen: Blood              LE Doppler 2/2/2023 Interpretation Summary  No evidence of deep vein thrombosis in the right lower extremity. No evidence of deep vein thrombosis in the left common femoral vein. ECHO 2/2 Interpretation Summary  Result status: Final result     Left Ventricle: Normal left ventricular systolic function with a visually estimated EF of 55 - 60%. Left ventricle size is normal. Moderately increased wall thickness. Findings consistent with moderate concentric hypertrophy. Normal wall motion. Grade I diastolic dysfunction present with normal LV EF. Left Atrium: Left atrium is mildly dilated. Tricuspid Valve: Unable to assess RVSP due to inadequate or insignificant tricuspid regurgitation. Ultrasound kidneys 2/2 FINDINGS:  The kidneys have diffuse mildly increased echogenicity with no mass, stone or hydronephrosis. A 13 mm cyst is incidentally shown in the lower pole the right kidney and a 9 mm cyst in the interpolar region of the left kidney. The right kidney measures 12.5 cm and the left kidney measures 14.0 cm in length. The aorta and the proximal iliac arteries are obscured by bowel gas and not assessed. The IVC is normal. No retroperitoneal mass is identified. The urinary bladder is normal. Bilateral ureteral jets are documented.   IMPRESSION  Medical renal disease. No evidence for hydronephrosis. Images report reviewed by me:  CT 2/1  (Most Recent)  Results from Hospital Encounter encounter on 02/01/23    CT HIP RT W CONT    Narrative  EXAM: CT HIP RT W CONT    INDICATION: Right hip swelling, possible abscess. COMPARISON: None    TECHNIQUE: Helical CT of the right hip with coronal and sagittal reformats. Images reviewed in soft tissue and bone windows. CT dose reduction was achieved  through the use of a standardized protocol tailored for this examination and  automatic exposure control for dose modulation. CONTRAST: Post IV contrast 100 mL Isovue-300    FINDINGS: Bones: Normal bone mineralization. No acute fracture, dislocation,  suspicious bone lesion, or osteomyelitis. Hardware: Lumbar spinal hardware and postsurgical findings are partially imaged. Severe right foraminal stenosis at L5-S1. Joint fluid: None. Articulations: Mild right hip osteoarthritis. No evidence of septic arthritis. Tendons: No full-thickness tendon tear. Muscles: Mild atrophy of the right gluteus cheryl muscle. Inflammation involves  the right sartorius muscle. Soft tissue mass: No mass. Multiple reactive right inguinal lymph nodes. No  drainable fluid collection. Skin thickening and stranding in the subcutaneous  adipose tissues involve the right sartorius muscle. Heterogeneous increased  attenuation in the fat lateral to the sartorius muscle measures 7.9 x 8.3 x 4.0  cm. Impression  Right thigh cellulitis. Ill-defined phlegmon in the proximal, anterior right  thigh involves the right sartorius muscle. No drainable abscess at this time. No  fracture or osteomyelitis. CXR reviewed by me:  XR 2/1  (Most Recent).  CXR   Results from [place] encounter on 02/01/23    XR CHEST PORT    Narrative  INDICATION:  meets SIRS criteria    COMPARISON: None    FINDINGS: Single AP portable view of the chest obtained at 1627 demonstrates an  enlarged cardiac silhouette. The lungs are hypoinspiratory but clear  bilaterally. No osseous abnormalities are seen. Impression  Cardiomegaly. No focal pulmonary process. Please note: Voice-recognition software may have been used to generate this report, which may have resulted in some phonetic-based errors in grammar and contents. Even though attempts were made to correct all the mistakes, some may have been missed, and remained in the body of the document. Dragon software not working well since recent [employer], and care taken as much as possible to correct mistakes.       Enrique Reed MD  2/3/2023

## 2023-02-03 NOTE — PROGRESS NOTES
Pt admitted to room from ICU. He is alert and oriented and in pleasant spirits. Midlines intact and flushed, IV abx running well. Family at bedside. Dressings in place to right hip with visible redness and pt reports tenderness to touch. PRN meds provided for pain and headache. Pt encouraged to call when he needs to go to the restroom, he stood well at bedside x1 assist. Pt noted with some SOB on exertion. No further concerns at this time.

## 2023-02-03 NOTE — PROGRESS NOTES
[facility]  (A Division of [address] [place] [address])                                                           Date of Admission: 2/1/2023   Date of Note: 2/3/2023  Reason for Consult: cellulitis  Referring MD: Dr Kareem Gonsales    Current Antimicrobials:    Prior Antimicrobials:  Zosyn -> Ceftriaxone and Vancomycin 2/1 to date   Clindamycin PO PTA  Ceftriaxone 1 gm IV X1 --2/1/23   Allergy to antibiotics: None     Assessment:     Sepsis / leucocytosis / elevated lactic acid 2/2 below-- non sustained hypotension:   Right thigh SSI/Cellulitis --possible early abscess  Leucocytosis: Declining  VIKTOR-- acute Vs acute on chronic ( BMP 2019- normal)? No CPK   Pre-diabetes-- On Metformin by history  SHAHRIAR  Morbidly obese- BMI 48  Pul Hypertension-- was on Viagra  History of back surgery- 2016    Recommendation -- ID related:     FU BCX : NGSF  MRSA screen: Pending  WCX from right hip is from superficial open wound-- heavy strep spp  Cont ceftriaxone and  Vanco-- will streamline soon  FU US of R thigh ordered-- to assess abscess formation. May need FU CT over weekend  Dr German Patel is on call over weekend . Please call if questions or consults. Thank you. Subjective:     R thigh hurts, still with some headache  Body aches is better  No N/V/Diarreha    Afebrile, wbc improving. Transfer out of ICU today. IFEOMA RN    Notes/Labs/Cultures and Imaging reports reviewed. HPI:       Arvin Connell is a 46 y.o. male with hypertension, GERD, sleep apnea ,Pul hypertension and Obese. He had a fall 4 weeks ago which happened when he was pushing heavy set equipment. Had small wound with pain over there that later became red and was given Clindamycin by his PCP- > a week ago. It got better initially, but later he noted anterior thigh -right- swelling and pain, had fever/chills and body aches daily for >4-5 days.  He came in to ED on 2/2-- found to have leucocytosis, briefly hypotensive, no tachycardia or hypoxia. CT showed R thigh cellulitis with ill defined phlegmon -- no drain able abscess. No fracture of OM. C/O of headaches, right thigh pain, but no N/V/Diarrhea/SOB/ abdominal pain. Voiding urine without any issue. Currently on Vancomycin and zosyn. He is unemployed due to back pain. He used to work in construction    Active Hospital Problems    Diagnosis Date Noted    Controlled type 2 diabetes mellitus, without long-term current use of insulin ([facility] 02/02/2023    Lactic acidosis 02/02/2023    Cellulitis 02/01/2023    Hypotension 02/01/2023    Sepsis ([facility] 02/01/2023    VIKTOR (acute kidney injury) ([facility] 02/01/2023    Se[facility] shock ([facility] 02/01/2023    SHAHRIAR (obstructive sleep apnea) 02/01/2023    BMI 45.0-49.9, adult ([facility] 02/01/2023     Past Medical History:   Diagnosis Date    Arthritis     spine    Chronic pain     lumbar    GERD (gastroesophageal reflux disease)     Sleep apnea     CPAP in the past, was 300 lbs, lost weight     Past Surgical History:   Procedure Laterality Date    HX ORTHOPAEDIC  1990    right ankle surgery     No family history on file. Social History     Socioeconomic History    Marital status:      Spouse name: Not on file    Number of children: Not on file    Years of education: Not on file    Highest education level: Not on file   Occupational History    Not on file   Tobacco Use    Smoking status: Never    Smokeless tobacco: Never   Substance and Sexual Activity    Alcohol use:  Yes     Alcohol/week: 5.0 standard drinks     Types: 5 Shots of liquor per week    Drug use: No    Sexual activity: Yes   Other Topics Concern    Not on file   Social History Narrative    Not on file     Social Determinants of Health     Financial Resource Strain: Not on file   Food Insecurity: Not on file   Transportation Needs: Not on file   Physical Activity: Not on file   Stress: Not on file   Social Connections: Not on file   Intimate Partner Violence: Not on file   Housing Stability: Not on file       Medications:  Current Facility-Administered Medications   Medication Dose Route Frequency    insulin glargine (LANTUS) injection 6 Units  6 Units SubCUTAneous DAILY    vancomycin (VANCOCIN) 1500 mg in  ml infusion  1,500 mg IntraVENous Q24H    [START ON 2023] Vancomycin random level 23 with am labs   1 Each Other ONCE    acetaminophen (TYLENOL) tablet 650 mg  650 mg Oral Q6H PRN    oxyCODONE IR (ROXICODONE) tablet 5 mg  5 mg Oral Q4H PRN    piperacillin-tazobactam (ZOSYN) 3.375 g in 0.9% sodium chloride (MBP/ADV) 100 mL MBP  3.375 g IntraVENous Q8H    HYDROmorphone (DILAUDID) injection 1 mg  1 mg IntraVENous Q4H PRN    insulin lispro (HUMALOG) injection   SubCUTAneous AC&HS    glucose chewable tablet 16 g  4 Tablet Oral PRN    glucagon (GLUCAGEN) injection 1 mg  1 mg IntraMUSCular PRN    dextrose 10% infusion 0-250 mL  0-250 mL IntraVENous PRN    dorzolamide-timoloL (COSOPT) 22.3-6.8 mg/mL ophthalmic solution 1 Drop  1 Drop Both Eyes BID    sodium chloride (NS) flush 5-10 mL  5-10 mL IntraVENous PRN    Vanocmycin - Rx to dose and monitor  1 Each Other Rx Dosing/Monitoring    heparin (porcine) injection 5,000 Units  5,000 Units SubCUTAneous Q8H    pantoprazole (PROTONIX) tablet 40 mg  40 mg Oral ACB        Review of Systems     Negative Unless BOLDED     General: fevers, chills, myalgias, arthralgias, unexplained weight loss, malaise, fatigue.   HEENT:  headaches, recent URI  PUlMONARY:  cough , shortness of breath  Cardiovascular: chest pain         Objective:       Visit Vitals  BP (!) 147/61   Pulse (!) 101   Temp 98.5 °F (36.9 °C)   Resp 18   Ht 5' 9\" (1.753 m)   Wt 149.7 kg (330 lb)   SpO2 (!) 85%   BMI 48.73 kg/m²     Temp (24hrs), Av.9 °F (36.6 °C), Min:97.7 °F (36.5 °C), Max:98.5 °F (36.9 °C)      Lines: PIV    General:   WD Obese , on RA awake alert and oriented   Skin:   no diffuse rashes  Erythema and boggy swelling on proximal upper thigh-- groin area and scrotum-- not involved  Dime sized open wound on R buttock area from fall   HEENT:  Normocephalic, atraumatic, EOMI, no scleral icterus or pallor; no conjunctival hemmohage;       Lungs:   non-labored, bilaterally clear to aspiration   Heart:  RRR, s1 and s2; no murmurs rubs or gallops, no edema, + pedal pulses   Abdomen:  soft, severely obese, active bowel sounds. Appropriate surgical scars for stated surgeries. Non-tender   Genitourinary:  deferred   Extremities:   no clubbing, cyanosis; no joint effusions or swelling; ; muscle mass appropriate for age   Neurologic:  No gross focal sensory abnormalities;  Cranial nerves intact   Psychiatric:   appropriate and interactive.         Labs: Results:   Chemistry Recent Labs     02/03/23 0412 02/02/23  0500 02/01/23  1500   * 140* 184*   * 135* 136   K 3.6 3.9 4.1    103 99*   CO2 24 23 27   BUN 23* 32* 25*   CREA 1.80* 3.33* 3.26*   CA 8.4* 8.2* 9.6   AGAP 10 9 10   BUCR 13 10* 8*   AP  --   --  71   TP  --   --  7.7   ALB  --   --  2.8*   GLOB  --   --  4.9*   AGRAT  --   --  0.6*      CBC w/Diff Recent Labs     02/03/23 0412 02/02/23  0500 02/01/23  1500   WBC 16.6* 21.7* 28.1*   RBC 4.52 4.62 5.30   HGB 12.5* 12.7* 14.8   HCT 39.6 40.8 46.0    194 245   GRANS  --   --  87*   LYMPH  --   --  3*   EOS  --   --  1            No results found for: SDES Lab Results   Component Value Date/Time    Culture result: HEAVY PROBABLE Streptococcus species (A) 02/01/2023 03:20 PM    Culture result: NO GROWTH 2 DAYS 02/01/2023 03:00 PM    Culture result:  01/20/2016 09:45 AM     MRSA target DNA is not detected (presumptive not colonized with MRSA)        Results       Procedure Component Value Units Date/Time    CULTURE, MRSA [[id number]] Collected: 02/02/23 1408    Order Status: Sent Specimen: Nasal from Nares Updated: 02/02/23 2314    CULTURE, BLOOD [[id number]] Collected: 02/02/23 0400    Order Status: Canceled Specimen: Blood     CULTURE, WOUND W GRAM STAIN [[id number]]  (Abnormal) Collected: 02/01/23 1520    Order Status: Completed Specimen: Wound from Abscess Updated: 02/02/23 1410     Special Requests: --        RIGHT  HIP       GRAM STAIN OCCASIONAL WBCS SEEN               2+ Gram positive cocci IN PAIRS           Culture result:       HEAVY PROBABLE Streptococcus species          CULTURE, BLOOD [[id number]] Collected: 02/01/23 1500    Order Status: Completed Specimen: Blood Updated: 02/03/23 0717     Special Requests: NO SPECIAL REQUESTS        Culture result: NO GROWTH 2 DAYS                 Imaging: All imaging reviewed from Admission to present as per radiology interpretation in [address] [facility]  Jillian Garcia MD  [facility])  Office #:     [phone]  [phone]   Office Fax: [phone]

## 2023-02-03 NOTE — WOUND CARE
IP WOUND CONSULT    Josep Rebollar  MEDICAL RECORD NUMBER:  [id number]  AGE: 46 y.o. GENDER: male  : 1970  TODAY'S DATE:  2/3/2023    GENERAL     [] Follow-up   [x] New Consult    [x] Present on Admission  [] Hospital Acquired    Ke Penn is a 46 y.o. male referred by:   [x] Physician  [] Nursing  [] Other:         PAST MEDICAL HISTORY    Past Medical History:   Diagnosis Date    Arthritis     spine    Chronic pain     lumbar    GERD (gastroesophageal reflux disease)     Sleep apnea     CPAP in the past, was 300 lbs, lost weight        PAST SURGICAL HISTORY    Past Surgical History:   Procedure Laterality Date    HX ORTHOPAEDIC      right ankle surgery       FAMILY HISTORY    No family history on file. SOCIAL HISTORY    Social History     Tobacco Use    Smoking status: Never    Smokeless tobacco: Never   Substance Use Topics    Alcohol use: Yes     Alcohol/week: 5.0 standard drinks     Types: 5 Shots of liquor per week    Drug use: No       ALLERGIES    No Known Allergies    MEDICATIONS    No current facility-administered medications on file prior to encounter. Current Outpatient Medications on File Prior to Encounter   Medication Sig Dispense Refill    sildenafil citrate (VIAGRA) 100 mg tablet Take 100 mg by mouth two (2) times a day. metoprolol tartrate (LOPRESSOR) 100 mg IR tablet Take 100 mg by mouth two (2) times a day.      ezetimibe (ZETIA) 10 mg tablet Take 10 mg by mouth daily. rosuvastatin (CRESTOR) 40 mg tablet Take 40 mg by mouth nightly. latanoprost (XALATAN) 0.005 % ophthalmic solution Administer 1 Drop to both eyes nightly. metFORMIN ER (GLUCOPHAGE XR) 500 mg tablet Take 500 mg by mouth daily (with dinner). verapamiL (CALAN) 120 mg tablet Take 120 mg by mouth three (3) times daily. Only been taking 2x daily, took 1 tab this morning      buPROPion XL (WELLBUTRIN XL) 300 mg XL tablet Take 300 mg by mouth daily.       clindamycin (CLEOCIN) 300 mg capsule Take 300 mg by mouth three (3) times daily. Has 100 mg bottle will capsules at bedside as well to make a total of 400 mg      oxyCODONE-acetaminophen (PERCOCET 10)  mg per tablet Take 1 Tab by mouth every six (6) hours as needed for Pain. lisinopril (PRINIVIL, ZESTRIL) 40 mg tablet Take 40 mg by mouth daily. (Patient not taking: Reported on 2/1/2023)      amLODIPine (NORVASC) 5 mg tablet Take 5 mg by mouth daily. (Patient not taking: Reported on 2/1/2023)      phenylephrine (NEOSYNEPHRINE) 1 % spry 2 Sprays by Both Nostrils route every six (6) hours as needed. (Patient not taking: Reported on 2/1/2023)         Wt Readings from Last 3 Encounters:   02/02/23 149.7 kg (330 lb)   12/08/16 114.8 kg (253 lb)   01/25/16 104.4 kg (230 lb 2 oz)       [email or web address] Vitals  BP (!) 154/83 (BP 1 Location: Right lower arm, BP Patient Position: At rest;Sitting)   Pulse 95   Temp 98.7 °F (37.1 °C)   Resp 22   Ht 5' 9\" (1.753 m)   Wt 149.7 kg (330 lb)   SpO2 99%   BMI 48.73 kg/m²       ASSESSMENT     Skin impairment Identification:  Type:  cellulitis    Contributing Factors: diabetes       02/03/23 1631   Wound Thigh Right;Lateral 02/03/23   Date First Assessed/Time First Assessed: 02/03/23 1100   Primary Wound Type: Blister/bullae  Location: Thigh  Wound Location Orientation: Right;Lateral  Date of First Observation: 02/03/23   Wound Image    Wound Etiology Other (Comment)  (cellulitis)   Dressing Status Other (Comment)  (open to air)   Cleansed Cleansed with saline   Dressing/Treatment Collagen with Ag;Alginate with Ag;Silicone border   Wound Length (cm) 1.8 cm   Wound Width (cm) 1.9 cm   Wound Depth (cm) 0.2 cm   Wound Surface Area (cm^2) 3.42 cm^2   Wound Volume (cm^3) 0.684 cm^3   Wound Assessment Granulation tissue   Drainage Amount Moderate   Drainage Description Serosanguinous   Wound Odor None   Lyly-Wound/Incision Assessment Induration;Blanchable erythema; Warm;Other (Comment)  (painfull)   Edges Defined edges   Wound Thickness Description Partial thickness   Wound Thigh Anterior;Proximal;Right 02/03/23   No Date First Assessed or Time First Assessed found. Primary Wound Type: Blister/bullae  Location: Thigh  Wound Location Orientation: Anterior;Proximal;Right  Date of First Observation: 02/03/23   Wound Image    Wound Etiology Other (Comment)  (Cellulitis)   Dressing Status Other (Comment)  (open to air)   Cleansed Irrigated with saline   Dressing/Treatment Alginate with Ag;Silicone border   Wound Length (cm) 4 cm   Wound Width (cm) 9 cm   Wound Surface Area (cm^2) 36 cm^2   Wound Assessment Fluid filled blister   Drainage Amount Small   Drainage Description Purulent; Thin   Wound Odor None   Lyly-Wound/Incision Assessment Blanchable erythema; Induration; Warm   Wound Thickness Description Partial thickness       PLAN     Skin Care & Pressure Relief Recommendations  Minimize layers of linen  Pads under patient to optimize support surface    Recommendations: keep areas covered and avoid skin to skin contact between the abdomen and the thighs    Teaching completed with:   [x] Patient           [x] Family member       [] Caregiver          [x] Nursing  [] Other    Patient/Caregiver Teaching:  Level of patient/caregiver understanding able to:   [x] Indicates understanding       [] Needs reinforcement  [] Unsuccessful      [] Verbal Understanding  [] Demonstrated understanding       [] No evidence of learning  [] Refused teaching         [] N/A       Electronically signed by Mara Cardenas RN on 2/3/2023 at 4:38 PM

## 2023-02-03 NOTE — PROGRESS NOTES
US report--> reviewed  An approximately 7.9 cm x 7.9 cm x 2.7 cm amorphous mild complex collection in  the subcutaneous soft tissues. Surrounding induration and interstitial edema of  the subcutaneous fat layer. Plan:  Recommend Surgical consult    Gina Bennett MD  [facility])  Office #:     [phone]   Office Fax: [phone]

## 2023-02-03 NOTE — PROGRESS NOTES
Hospitalist Progress Note    Patient: Zach Hays MRN: [id number]  CSN: [id number]    YOB: 1970  Age: 46 y.o. Sex: male    DOA: 2/1/2023 LOS:  LOS: 2 days                Assessment/Plan     Patient Active Problem List   Diagnosis Code    Lumbar stenosis M48.061    Cellulitis L03.90    Hypotension I95.9    Sepsis ([facility] A41.9    VIKTOR (acute kidney injury) ([facility] N17.9    Septic shock (HCC) A41.9, R65.21    SHAHRIAR (obstructive sleep apnea) G47.33    BMI 45.0-49.9, adult ([facility] Z68.42    Controlled type 2 diabetes mellitus, without long-term current use of insulin (HCC) E11.9    Lactic acidosis E87.20        Chief complaint :  Sepsis, cellulitis. 46 y.o. male with hypertension, GERD, sleep apnea presents to ER with concerns of right thigh wound, redness, swelling. Resp - no acute respiratory issues, will use oxygen by NC as needed  SHAHRIAR - had CPAP years ago, does not use CPAP. ID - Follow up blood  cx. Cellulitis - right thigh  Open wound right thigh  ANTIBIOTICS vancomycin, zosyn. ID consulted     CVS - Monitor HD. Septic shock - IVF,   Received albumin,   Off levophed     Heme/onc - Follow H&H, plts. Renal - Trend BUN, Cr, follow I/O. Check and replace Mg, K, phos. VIKTOR - started on IVF  renal US with no obstruction  Avoid nephrotoxins  Nephrology consulted. Endocrine -  Follow FSG     Neuro/ Pain/ Sedation -   Stable mental status     GI -   Diabetic diet. Prophylaxis - DVT: heparin, GI: protonix. Transfer to floor     Estimated length of stay : 2-3 days. Review of systems  General: No fevers or chills. Cardiovascular: No chest pain or pressure. No palpitations. Pulmonary: No shortness of breath. Gastrointestinal: No nausea, vomiting. Physical Exam:  General: Awake, cooperative, no acute distress    HEENT: NC, Atraumatic. PERRLA, anicteric sclerae. Lungs: CTA Bilaterally. No Wheezing/Rhonchi/Rales.   Heart:  S1 S2,  No murmur, No Rubs, No Gallops  Abdomen: Soft, Non distended, Non tender. +Bowel sounds,   Extremities: Right thigh around groin and right hip are with redness, swelling and induration. TTP. Open wound right thigh  Psych:   Not anxious or agitated. Neurologic:  No acute neurological deficit. Vital signs/Intake and Output:  Visit Vitals  /63   Pulse 92   Temp 98.5 °F (36.9 °C)   Resp 17   Ht 5' 9\" (1.753 m)   Wt 149.7 kg (330 lb)   SpO2 96%   BMI 48.73 kg/m²     Current Shift:  02/03 0701 - 02/03 1900  In: -   Out: 600 [Urine:600]  Last three shifts:  02/01 1901 - 02/03 0700  In: 5733.4 [P.O.:960; I.V.:4773.4]  Out: 5850 [Urine:5850]            Labs: Results:       Chemistry Recent Labs     02/03/23 0412 02/02/23  0500 02/01/23  1500   * 140* 184*   * 135* 136   K 3.6 3.9 4.1    103 99*   CO2 24 23 27   BUN 23* 32* 25*   CREA 1.80* 3.33* 3.26*   CA 8.4* 8.2* 9.6   AGAP 10 9 10   BUCR 13 10* 8*   AP  --   --  71   TP  --   --  7.7   ALB  --   --  2.8*   GLOB  --   --  4.9*   AGRAT  --   --  0.6*      CBC w/Diff Recent Labs     02/03/23 0412 02/02/23  0500 02/01/23  1500   WBC 16.6* 21.7* 28.1*   RBC 4.52 4.62 5.30   HGB 12.5* 12.7* 14.8   HCT 39.6 40.8 46.0    194 245   GRANS  --   --  87*   LYMPH  --   --  3*   EOS  --   --  1      Cardiac Enzymes No results for input(s): CPK, CKND1, ADELINA in the last 72 hours. No lab exists for component: CKRMB, TROIP   Coagulation No results for input(s): PTP, INR, APTT, INREXT, INREXT in the last 72 hours. Lipid Panel No results found for: CHOL, CHOLPOCT, CHOLX, CHLST, CHOLV, 771418, HDL, HDLP, LDL, LDLC, DLDLP, 407365, VLDLC, VLDL, TGLX, TRIGL, TRIGP, TGLPOCT, CHHD, CHHDX   BNP No results for input(s): BNPP in the last 72 hours.    Liver Enzymes Recent Labs     02/01/23  1500   TP 7.7   ALB 2.8*   AP 71      Thyroid Studies No results found for: T4, T3U, TSH, TSHEXT, TSHEXT     Procedures/imaging: see electronic medical records for all procedures/Xrays and details which were not copied into this note but were reviewed prior to creation of Plan

## 2023-02-04 ENCOUNTER — ANESTHESIA EVENT (OUTPATIENT)
Dept: SURGERY | Age: 53
DRG: 871 | End: 2023-02-04
Payer: COMMERCIAL

## 2023-02-04 ENCOUNTER — ANESTHESIA (OUTPATIENT)
Dept: SURGERY | Age: 53
DRG: 871 | End: 2023-02-04
Payer: COMMERCIAL

## 2023-02-04 LAB
ANION GAP SERPL CALC-SCNC: 9 MMOL/L (ref 3–18)
BUN SERPL-MCNC: 19 MG/DL (ref 7–18)
BUN/CREAT SERPL: 12 (ref 12–20)
CALCIUM SERPL-MCNC: 8.5 MG/DL (ref 8.5–10.1)
CHLORIDE SERPL-SCNC: 102 MMOL/L (ref 100–111)
CO2 SERPL-SCNC: 26 MMOL/L (ref 21–32)
CREAT SERPL-MCNC: 1.61 MG/DL (ref 0.6–1.3)
ERYTHROCYTE [DISTWIDTH] IN BLOOD BY AUTOMATED COUNT: 13.7 % (ref 11.6–14.5)
GLUCOSE BLD STRIP.AUTO-MCNC: 123 MG/DL (ref 70–110)
GLUCOSE BLD STRIP.AUTO-MCNC: 134 MG/DL (ref 70–110)
GLUCOSE BLD STRIP.AUTO-MCNC: 141 MG/DL (ref 70–110)
GLUCOSE BLD STRIP.AUTO-MCNC: 283 MG/DL (ref 70–110)
GLUCOSE SERPL-MCNC: 162 MG/DL (ref 74–99)
HCT VFR BLD AUTO: 42.6 % (ref 36–48)
HGB BLD-MCNC: 13.8 G/DL (ref 13–16)
MCH RBC QN AUTO: 27.6 PG (ref 24–34)
MCHC RBC AUTO-ENTMCNC: 32.4 G/DL (ref 31–37)
MCV RBC AUTO: 85.2 FL (ref 78–100)
NRBC # BLD: 0 K/UL (ref 0–0.01)
NRBC BLD-RTO: 0 PER 100 WBC
PLATELET # BLD AUTO: 225 K/UL (ref 135–420)
PMV BLD AUTO: 11 FL (ref 9.2–11.8)
POTASSIUM SERPL-SCNC: 3.4 MMOL/L (ref 3.5–5.5)
RBC # BLD AUTO: 5 M/UL (ref 4.35–5.65)
SODIUM SERPL-SCNC: 137 MMOL/L (ref 136–145)
VANCOMYCIN SERPL-MCNC: 7.8 UG/ML (ref 5–40)
WBC # BLD AUTO: 17 K/UL (ref 4.6–13.2)

## 2023-02-04 PROCEDURE — 85027 COMPLETE CBC AUTOMATED: CPT

## 2023-02-04 PROCEDURE — 77030020782 HC GWN BAIR PAWS FLX 3M -B: Performed by: SURGERY

## 2023-02-04 PROCEDURE — 77030006643: Performed by: ANESTHESIOLOGY

## 2023-02-04 PROCEDURE — 74011250636 HC RX REV CODE- 250/636: Performed by: INTERNAL MEDICINE

## 2023-02-04 PROCEDURE — 76010000138 HC OR TIME 0.5 TO 1 HR: Performed by: SURGERY

## 2023-02-04 PROCEDURE — 74011250636 HC RX REV CODE- 250/636: Performed by: SURGERY

## 2023-02-04 PROCEDURE — 0J9L0ZZ DRAINAGE OF RIGHT UPPER LEG SUBCUTANEOUS TISSUE AND FASCIA, OPEN APPROACH: ICD-10-PCS | Performed by: SURGERY

## 2023-02-04 PROCEDURE — 74011636637 HC RX REV CODE- 636/637: Performed by: SURGERY

## 2023-02-04 PROCEDURE — 65270000029 HC RM PRIVATE

## 2023-02-04 PROCEDURE — 2709999900 HC NON-CHARGEABLE SUPPLY: Performed by: SURGERY

## 2023-02-04 PROCEDURE — 80202 ASSAY OF VANCOMYCIN: CPT

## 2023-02-04 PROCEDURE — 36415 COLL VENOUS BLD VENIPUNCTURE: CPT

## 2023-02-04 PROCEDURE — 74011250636 HC RX REV CODE- 250/636: Performed by: ANESTHESIOLOGY

## 2023-02-04 PROCEDURE — 77030019895 HC PCKNG STRP IODO -A: Performed by: SURGERY

## 2023-02-04 PROCEDURE — 87075 CULTR BACTERIA EXCEPT BLOOD: CPT

## 2023-02-04 PROCEDURE — 77030008477 HC STYL SATN SLP COVD -A: Performed by: ANESTHESIOLOGY

## 2023-02-04 PROCEDURE — 74011250636 HC RX REV CODE- 250/636: Performed by: NURSE ANESTHETIST, CERTIFIED REGISTERED

## 2023-02-04 PROCEDURE — 74011000258 HC RX REV CODE- 258: Performed by: INTERNAL MEDICINE

## 2023-02-04 PROCEDURE — 80048 BASIC METABOLIC PNL TOTAL CA: CPT

## 2023-02-04 PROCEDURE — 87205 SMEAR GRAM STAIN: CPT

## 2023-02-04 PROCEDURE — 76060000032 HC ANESTHESIA 0.5 TO 1 HR: Performed by: SURGERY

## 2023-02-04 PROCEDURE — 82962 GLUCOSE BLOOD TEST: CPT

## 2023-02-04 PROCEDURE — 74011250637 HC RX REV CODE- 250/637: Performed by: FAMILY MEDICINE

## 2023-02-04 PROCEDURE — 74011000250 HC RX REV CODE- 250: Performed by: NURSE ANESTHETIST, CERTIFIED REGISTERED

## 2023-02-04 PROCEDURE — 77030031139 HC SUT VCRL2 J&J -A: Performed by: SURGERY

## 2023-02-04 PROCEDURE — 74011250637 HC RX REV CODE- 250/637: Performed by: HOSPITALIST

## 2023-02-04 PROCEDURE — 74011636637 HC RX REV CODE- 636/637: Performed by: INTERNAL MEDICINE

## 2023-02-04 PROCEDURE — 76210000006 HC OR PH I REC 0.5 TO 1 HR: Performed by: SURGERY

## 2023-02-04 RX ORDER — PHENYLEPHRINE HCL IN 0.9% NACL 1 MG/10 ML
SYRINGE (ML) INTRAVENOUS AS NEEDED
Status: DISCONTINUED | OUTPATIENT
Start: 2023-02-04 | End: 2023-02-04 | Stop reason: HOSPADM

## 2023-02-04 RX ORDER — MIDAZOLAM HYDROCHLORIDE 1 MG/ML
INJECTION, SOLUTION INTRAMUSCULAR; INTRAVENOUS AS NEEDED
Status: DISCONTINUED | OUTPATIENT
Start: 2023-02-04 | End: 2023-02-04 | Stop reason: HOSPADM

## 2023-02-04 RX ORDER — POTASSIUM CHLORIDE 7.45 MG/ML
10 INJECTION INTRAVENOUS
Status: COMPLETED | OUTPATIENT
Start: 2023-02-04 | End: 2023-02-04

## 2023-02-04 RX ORDER — PROPOFOL 10 MG/ML
INJECTION, EMULSION INTRAVENOUS AS NEEDED
Status: DISCONTINUED | OUTPATIENT
Start: 2023-02-04 | End: 2023-02-04 | Stop reason: HOSPADM

## 2023-02-04 RX ORDER — FLUMAZENIL 0.1 MG/ML
0.2 INJECTION INTRAVENOUS
Status: DISCONTINUED | OUTPATIENT
Start: 2023-02-04 | End: 2023-02-04 | Stop reason: HOSPADM

## 2023-02-04 RX ORDER — SODIUM CHLORIDE, SODIUM LACTATE, POTASSIUM CHLORIDE, CALCIUM CHLORIDE 600; 310; 30; 20 MG/100ML; MG/100ML; MG/100ML; MG/100ML
100 INJECTION, SOLUTION INTRAVENOUS CONTINUOUS
Status: DISCONTINUED | OUTPATIENT
Start: 2023-02-04 | End: 2023-02-04 | Stop reason: HOSPADM

## 2023-02-04 RX ORDER — FENTANYL CITRATE 50 UG/ML
INJECTION, SOLUTION INTRAMUSCULAR; INTRAVENOUS AS NEEDED
Status: DISCONTINUED | OUTPATIENT
Start: 2023-02-04 | End: 2023-02-04 | Stop reason: HOSPADM

## 2023-02-04 RX ORDER — DEXAMETHASONE SODIUM PHOSPHATE 4 MG/ML
INJECTION, SOLUTION INTRA-ARTICULAR; INTRALESIONAL; INTRAMUSCULAR; INTRAVENOUS; SOFT TISSUE AS NEEDED
Status: DISCONTINUED | OUTPATIENT
Start: 2023-02-04 | End: 2023-02-04 | Stop reason: HOSPADM

## 2023-02-04 RX ORDER — HYDROMORPHONE HYDROCHLORIDE 1 MG/ML
0.5 INJECTION, SOLUTION INTRAMUSCULAR; INTRAVENOUS; SUBCUTANEOUS
Status: DISCONTINUED | OUTPATIENT
Start: 2023-02-04 | End: 2023-02-04 | Stop reason: HOSPADM

## 2023-02-04 RX ORDER — ONDANSETRON 2 MG/ML
4 INJECTION INTRAMUSCULAR; INTRAVENOUS ONCE
Status: DISCONTINUED | OUTPATIENT
Start: 2023-02-04 | End: 2023-02-04 | Stop reason: HOSPADM

## 2023-02-04 RX ORDER — SUCCINYLCHOLINE CHLORIDE 100 MG/5ML
SYRINGE (ML) INTRAVENOUS AS NEEDED
Status: DISCONTINUED | OUTPATIENT
Start: 2023-02-04 | End: 2023-02-04 | Stop reason: HOSPADM

## 2023-02-04 RX ORDER — FENTANYL CITRATE 50 UG/ML
50 INJECTION, SOLUTION INTRAMUSCULAR; INTRAVENOUS
Status: DISCONTINUED | OUTPATIENT
Start: 2023-02-04 | End: 2023-02-04 | Stop reason: HOSPADM

## 2023-02-04 RX ORDER — HYDROMORPHONE HYDROCHLORIDE 1 MG/ML
1 INJECTION, SOLUTION INTRAMUSCULAR; INTRAVENOUS; SUBCUTANEOUS
Status: DISCONTINUED | OUTPATIENT
Start: 2023-02-04 | End: 2023-02-09 | Stop reason: HOSPADM

## 2023-02-04 RX ORDER — OXYCODONE HYDROCHLORIDE 5 MG/1
5 TABLET ORAL
Status: DISCONTINUED | OUTPATIENT
Start: 2023-02-04 | End: 2023-02-09 | Stop reason: HOSPADM

## 2023-02-04 RX ORDER — ONDANSETRON 2 MG/ML
INJECTION INTRAMUSCULAR; INTRAVENOUS AS NEEDED
Status: DISCONTINUED | OUTPATIENT
Start: 2023-02-04 | End: 2023-02-04 | Stop reason: HOSPADM

## 2023-02-04 RX ORDER — NALOXONE HYDROCHLORIDE 0.4 MG/ML
0.4 INJECTION, SOLUTION INTRAMUSCULAR; INTRAVENOUS; SUBCUTANEOUS AS NEEDED
Status: DISCONTINUED | OUTPATIENT
Start: 2023-02-04 | End: 2023-02-04 | Stop reason: HOSPADM

## 2023-02-04 RX ORDER — EPHEDRINE SULFATE/0.9% NACL/PF 50 MG/5 ML
SYRINGE (ML) INTRAVENOUS AS NEEDED
Status: DISCONTINUED | OUTPATIENT
Start: 2023-02-04 | End: 2023-02-04 | Stop reason: HOSPADM

## 2023-02-04 RX ADMIN — POTASSIUM CHLORIDE 10 MEQ: 7.46 INJECTION, SOLUTION INTRAVENOUS at 17:02

## 2023-02-04 RX ADMIN — HYDROMORPHONE HYDROCHLORIDE 1 MG: 1 INJECTION, SOLUTION INTRAMUSCULAR; INTRAVENOUS; SUBCUTANEOUS at 07:49

## 2023-02-04 RX ADMIN — HYDROMORPHONE HYDROCHLORIDE 0.5 MG: 1 INJECTION, SOLUTION INTRAMUSCULAR; INTRAVENOUS; SUBCUTANEOUS at 15:38

## 2023-02-04 RX ADMIN — DORZOLAMIDE HYDROCHLORIDE AND TIMOLOL MALEATE 1 DROP: 20; 5 SOLUTION/ DROPS OPHTHALMIC at 08:36

## 2023-02-04 RX ADMIN — CEFTRIAXONE 2 G: 2 INJECTION, POWDER, FOR SOLUTION INTRAMUSCULAR; INTRAVENOUS at 12:00

## 2023-02-04 RX ADMIN — ACETAMINOPHEN 650 MG: 325 TABLET ORAL at 12:15

## 2023-02-04 RX ADMIN — VANCOMYCIN HYDROCHLORIDE 1000 MG: 1 INJECTION, POWDER, LYOPHILIZED, FOR SOLUTION INTRAVENOUS at 21:28

## 2023-02-04 RX ADMIN — Medication 100 MCG: at 14:42

## 2023-02-04 RX ADMIN — DEXAMETHASONE SODIUM PHOSPHATE 4 MG: 4 INJECTION, SOLUTION INTRAMUSCULAR; INTRAVENOUS at 14:42

## 2023-02-04 RX ADMIN — ONDANSETRON HYDROCHLORIDE 4 MG: 2 INJECTION INTRAMUSCULAR; INTRAVENOUS at 14:55

## 2023-02-04 RX ADMIN — Medication 100 MG: at 14:40

## 2023-02-04 RX ADMIN — VANCOMYCIN HYDROCHLORIDE 1000 MG: 1 INJECTION, POWDER, LYOPHILIZED, FOR SOLUTION INTRAVENOUS at 14:00

## 2023-02-04 RX ADMIN — HYDROMORPHONE HYDROCHLORIDE 1 MG: 1 INJECTION, SOLUTION INTRAMUSCULAR; INTRAVENOUS; SUBCUTANEOUS at 18:50

## 2023-02-04 RX ADMIN — PROPOFOL 130 MG: 10 INJECTION, EMULSION INTRAVENOUS at 14:40

## 2023-02-04 RX ADMIN — VANCOMYCIN HYDROCHLORIDE 1000 MG: 1 INJECTION, POWDER, LYOPHILIZED, FOR SOLUTION INTRAVENOUS at 06:42

## 2023-02-04 RX ADMIN — MIDAZOLAM 2 MG: 1 INJECTION INTRAMUSCULAR; INTRAVENOUS at 14:34

## 2023-02-04 RX ADMIN — Medication 200 MCG: at 14:48

## 2023-02-04 RX ADMIN — DORZOLAMIDE HYDROCHLORIDE AND TIMOLOL MALEATE 1 DROP: 20; 5 SOLUTION/ DROPS OPHTHALMIC at 21:36

## 2023-02-04 RX ADMIN — FENTANYL CITRATE 50 MCG: 50 INJECTION, SOLUTION INTRAMUSCULAR; INTRAVENOUS at 14:59

## 2023-02-04 RX ADMIN — Medication 10 MG: at 14:52

## 2023-02-04 RX ADMIN — Medication 6 UNITS: at 08:33

## 2023-02-04 RX ADMIN — ACETAMINOPHEN 650 MG: 325 TABLET ORAL at 03:57

## 2023-02-04 RX ADMIN — HEPARIN SODIUM 5000 UNITS: 5000 INJECTION INTRAVENOUS; SUBCUTANEOUS at 21:28

## 2023-02-04 RX ADMIN — Medication 6 UNITS: at 21:28

## 2023-02-04 RX ADMIN — PANTOPRAZOLE SODIUM 40 MG: 40 TABLET, DELAYED RELEASE ORAL at 08:34

## 2023-02-04 RX ADMIN — POTASSIUM CHLORIDE 10 MEQ: 7.46 INJECTION, SOLUTION INTRAVENOUS at 18:58

## 2023-02-04 RX ADMIN — Medication 100 MCG: at 14:40

## 2023-02-04 RX ADMIN — HYDROMORPHONE HYDROCHLORIDE 0.5 MG: 1 INJECTION, SOLUTION INTRAMUSCULAR; INTRAVENOUS; SUBCUTANEOUS at 16:09

## 2023-02-04 RX ADMIN — POTASSIUM CHLORIDE 10 MEQ: 7.46 INJECTION, SOLUTION INTRAVENOUS at 18:14

## 2023-02-04 RX ADMIN — FENTANYL CITRATE 100 MCG: 50 INJECTION, SOLUTION INTRAMUSCULAR; INTRAVENOUS at 14:40

## 2023-02-04 NOTE — H&P (VIEW-ONLY)
[address] [facility]    Name:  Donna Callaway  MR#:   [id number]  :  1970  ACCOUNT #:  [de-identified]  DATE OF SERVICE:  2023    GENERAL SURGERY CONSULTATION AND PREOPERATIVE NOTE    REASON FOR CONSULTATION:  Right thigh abscess with surrounding cellulitis, acidosis, and sepsis. HISTORY OF PRESENT ILLNESS:  The patient is a 26-year-old white male with a history of morbid obesity, diabetes, admitted to the Hospitalist's Service two days ago with a several day history of increasing pain, redness, and swelling involving his proximal right thigh. Initial CT showed an ill-defined phlegmon in the proximal anterior right thigh, possibly involving the right sartorius muscle, but without drainable abscess. His white count was significantly elevated. While that white count has improved, it has remained elevated. He was initially hypotensive in the ER, but that also has improved. Lactic acid was also elevated initially at 3.3. Since admission, the discomfort has improved, but really has become more focal in the proximal right thigh. He denies any previous similar episode in this area, although he does admit to having small little \"boils\" lanced in the past in various areas of his body. He denies any chest pain, shortness of breath, or other symptoms at this time. PAST MEDICAL HISTORY:  Diabetes, sleep apnea, morbid obesity, chronic back pain, arthritis. PAST SURGICAL HISTORY:  Lumbar back surgery, right ankle surgery, multiple incision and drainage, for superficial abscess, procedures. MEDICATIONS:  Please see MedRec sheet. I do not see he is on any blood thinners or antiplatelet medications. ALLERGIES:  NO KNOWN DRUG ALLERGIES. SOCIAL HISTORY:  He does not smoke or use tobacco products. Rarely drinks alcohol. He does not use any illicit drugs. FAMILY HISTORY:  Noncontributory.     REVIEW OF SYSTEMS:  A 12-point review of systems was reviewed with the patient, negative apart from that listed in the HPI. PHYSICAL EXAMINATION:  GENERAL:  He is a well developed, super morbidly obese with a BMI of 48.73, not in any acute distress. VITAL SIGNS:  He has been afebrile for more than the last 24 hours. Last temperature taken 98.7, blood pressure at that time 154/83; pulse 95; saturating 99% to 100% on room air, respirations 22. HEENT:  Normocephalic, atraumatic. Pupils equal, round, and reactive to light and accommodation. Extraocular movements intact. Sclerae anicteric bilaterally. NECK:  Supple. No JVD. CARDIOVASCULAR:  Regular rate and rhythm. LUNGS:  Clear to auscultation, slightly decreased at bilateral bases. ABDOMEN:  Soft, nontender. Exam is significantly limited due to the patient's body habitus. EXTREMITIES:  Lower extremity edema noted. No calf tenderness. Good capillary refill. SKIN/INTEGUMENT:  Limited to the proximal anterior right thigh, shows a fairly large area of induration/edema and erythema, which by previously marked out periphery has decreased since admission. Exam is significantly limited by the patient's body habitus. No crepitus is noted. ANCILLARY STUDIES:  White count on admission was elevated at 28.1, improved, till yesterday 21.7; today, it has continued to improve, 16.6. H and H have been essentially stable throughout his stay, decreasing slightly likely with dilution from IV fluid only. Last recorded today 12.5 and 39.6. Platelets normal at 649. BMP at admission showed glucose elevated at 184, BUN and creatinine elevated at 25 and 3.26. Glucose is still elevated on this morning's BMP, 154. Bicarb is normal.  BUN and creatinine have improved to 23 and 1.80. Lactic acid on admission has been elevated at 3.3, improved to the following day at 1.5. No repeat has been taken.     RADIOLOGY:  CT on admission showed right thigh cellulitis with ill-defined phlegmon in the proximal anterior right thigh involving the right sartorius muscle, but no drainable abscess. Ultrasound done today, midday, showed an approximately 7.9 x 7.9 x 2.7 cm amorphous mild complex collection in the subcutaneous soft tissues with surrounding induration and interstitial edema of the subcutaneous fat layer. ASSESSMENT:  A 55-year-old white male with;  1.  Right proximal anterior thigh complex abscess with surrounding cellulitis. 2.  Acidosis, improving. 3.  Sepsis, improving. 4.  Elevated white count secondary to #1, improving. 5.  Super morbid obesity. Body mass index 48.73. PLAN:  As he has already eaten his dinner today, he will be made n.p.o. after midnight for OR tomorrow. That procedure would be incision, drainage, and possible debridement of right inferior thigh abscess/cellulitis. I discussed with the patient the nature of surgery, alternatives, benefits, and risks. The risks include, but are not limited to, medication reaction, anesthesia complications, cardiac or pulmonary complications including death, possible need for the patient to be intubated after surgery, inadvertant injury to nerves or blood vessels resulting in motor or sensory deficit or bleeding, recurrent/persistent infection requiring additional surgery and other measures, need to leave the wound open to heal by secondary intention postop, need for additional surgery, poor wound healing/scarring, postoperative pain, etc.      The patient understands these risks and is willing to give informed consent to proceed with surgery.       Doreen Payton MD      [id number]  D:  02/03/2023 17:05  T:  02/04/2023 1:10  JOB #:  [id number]  CC:  Ryan Sena MD

## 2023-02-04 NOTE — PERIOP NOTES
TRANSFER - IN REPORT:    Verbal report received from OR RN(name) on Jhoan Rebollar  being received from OR(unit) for routine progression of care      Report consisted of patients Situation, Background, Assessment and   Recommendations(SBAR). Information from the following report(s) OR Summary, Procedure Summary, Intake/Output, and MAR was reviewed with the receiving nurse. Opportunity for questions and clarification was provided. Assessment completed upon patients arrival to unit and care assumed.

## 2023-02-04 NOTE — ROUTINE PROCESS
Bedside shift change report given to Rosana Brittle, RN (oncoming nurse) by Rd Bean RN (offgoing nurse). Report included the following information SBAR, Procedure Summary, Intake/Output, MAR, and Recent Results.

## 2023-02-04 NOTE — PROGRESS NOTES
1102: Pt sitting up EOB with 2 visitors present. Pt to have surgery today, no scheduled time yet, will follow up again for PT.    1355: Pt in the bathroom, family in the room stated he is going for surgery in a few minutes. Will follow up after surgery for a re-evaluation for PT services.

## 2023-02-04 NOTE — PROGRESS NOTES
Vancomycin random level = 7.8 @ 0237 on 02/04/2023        Regimen: 1000 mg IV every 8 hours.   Start time: 14:24 on 02/04/2023  Exposure target: AUC24 (range)400-600 mg/L.hr   AUC24,ss: 461 mg/L.hr  Probability of AUC24 > 400: 100 %  Ctrough,ss: 12.5 mg/L  Probability of Ctrough,ss > 20: 0 %  Probability of nephrotoxicity (Lodise BRIDGET 2009): 8 %

## 2023-02-04 NOTE — PROGRESS NOTES
Bedside report received on pt, no complaint voiced, family members at bedside, pt noted to be shaking will check vital, call light within pt's reach.

## 2023-02-04 NOTE — INTERVAL H&P NOTE
Update History & Physical    The Patient's History and Physical of February 4, 2023 was reviewed with the patient and I examined the patient. There was no change. The surgical site was confirmed by the patient and me. Plan:  The risk, benefits, expected outcome, and alternative to the recommended procedure have been discussed with the patient. Patient understands and wants to proceed with the procedure.     Electronically signed by Cheo Apple MD on 2/4/2023 at 2:32 PM

## 2023-02-04 NOTE — PROGRESS NOTES
D/C Plan: [facility] with physician follow up pending clinical progression     Noted pt transfer to [address] [place] [address]. Noted plan incision, drainage, and possible debridement of right inferior thigh abscess/cellulitis by surgery. Please consider re consulting therapy services following surgical intervention to assist with care transition.   CM to continue to follow and assist.

## 2023-02-04 NOTE — BRIEF OP NOTE
Brief Postoperative Note    Patient: Kellen Montelongo  YOB: 1970  MRN: [id number]    Date of Procedure: 2/4/2023     Pre-Op Diagnosis: right thigh abcess    Post-Op Diagnosis: Same as preoperative diagnosis.       Procedure(s):  INCISION AND DRAINAGE RIGHT THIGH ABSCESS    Surgeon(s):  Kim Bal MD    Surgical Assistant: Surg Asst-1: Rita BARRETT    Anesthesia: General     Estimated Blood Loss (mL): Minimal    Complications: None    Specimens:   ID Type Source Tests Collected by Time Destination   1 : swab right thigh Wound Thigh CULTURE, ANAEROBIC, CULTURE, WOUND W Chelsea Velasquez MD 2/4/2023 1459 Microbiology        Implants: * No implants in log *    Drains: * No LDAs found *    Findings: see op note    Electronically Signed by Shalonda Solorzano MD on 2/4/2023 at 3:10 PM    Op note dictated #[id number]  RP

## 2023-02-04 NOTE — PROGRESS NOTES
Hospitalist Progress Note    Patient: Aileen Fields MRN: [id number]  CSN: [id number]    YOB: 1970  Age: 46 y.o. Sex: male    DOA: 2/1/2023 LOS:  LOS: 3 days            Patient Active Problem List   Diagnosis Code    Lumbar stenosis M48.061    Cellulitis L03.90    Hypotension I95.9    Sepsis ([address] A41.9    VIKTOR (acute kidney injury) ([address] N17.9    Septic shock (HCC) A41.9, R65.21    SHAHRIAR (obstructive sleep apnea) G47.33    BMI 45.0-49.9, adult ([address] Z68.42    Controlled type 2 diabetes mellitus, without long-term current use of insulin (HCC) E11.9    Lactic acidosis E87.20    Abscess of right thigh L02.415        IMPRESSION and Plan:    Aileen Fields is a 46 y.o. male with   Patient Active Problem List    Diagnosis Date Noted    Abscess of right thigh 02/03/2023    Controlled type 2 diabetes mellitus, without long-term current use of insulin ([address] 02/02/2023    Lactic acidosis 02/02/2023    Cellulitis 02/01/2023    Hypotension 02/01/2023    Sepsis ([address] 02/01/2023    VIKTOR (acute kidney injury) ([address] 02/01/2023    Septic shock ([address] 02/01/2023    SHAHRIAR (obstructive sleep apnea) 02/01/2023    BMI 45.0-49.9, adult ([address] 02/01/2023    Lumbar stenosis 01/25/2016     Principal Problem:    Cellulitis (2/1/2023)    Active Problems:    Hypotension (2/1/2023)      Sepsis ([address] (2/1/2023)      VIKTOR (acute kidney injury) ([address] (2/1/2023)      Septic shock ([address] (2/1/2023)      SHAHRIAR (obstructive sleep apnea) (2/1/2023)      BMI 45.0-49.9, adult ([address] (2/1/2023)      Controlled type 2 diabetes mellitus, without long-term current use of insulin ([address] (2/2/2023)      Lactic acidosis (2/2/2023)      Abscess of right thigh (2/3/2023)      Chief complaint :  Sepsis, cellulitis. 46 y.o. male with hypertension, GERD, sleep apnea presents to ER with concerns of right thigh wound, redness, swelling. SHAHRIAR - had CPAP years ago, does not use CPAP.       Right proximal anterior thigh complex abscess with surrounding cellulitis  ANTIBIOTICS vancomycin, zosyn. ID consulted. -- FOr I and d today        Septic shock - IVF, impoved     Heme/onc - Follow H&H, plts. VIKTOR - started on IVF  renal US with no obstruction  Avoid nephrotoxins  Nephrology consulted. Endocrine -  Follow FSG              Patient's condition is fair        Recommend to continue hospitalization. Discussed with patient. D/w family at bedside    Chief Complaints:   Chief Complaint   Patient presents with    Skin Problem     SUBJECTIVE:  Pt is seen and examined. Chart reviewed    For I and d today    Denies any pain        Review of systems:    Review of Systems   Constitutional:  Positive for malaise/fatigue. HENT: Negative. Eyes: Negative. Respiratory:  Negative for shortness of breath. Cardiovascular:  Negative for chest pain, palpitations, orthopnea and leg swelling. Gastrointestinal: Negative. Negative for abdominal pain, diarrhea and heartburn. Genitourinary:  Negative for dysuria and hematuria. Skin: Negative. Neurological:  Positive for weakness. Psychiatric/Behavioral:  Negative for depression, substance abuse and suicidal ideas. The patient is not nervous/anxious.       PE:  Patient Vitals for the past 24 hrs:   BP Temp Pulse Resp SpO2 Weight   02/04/23 0809 (!) 166/80 98.6 °F (37 °C) 76 19 100 % --   02/04/23 0345 -- -- -- -- -- 157.2 kg (346 lb 9 oz)   02/04/23 0312 (!) 165/62 98.4 °F (36.9 °C) 86 16 95 % --   02/03/23 2243 -- 99.8 °F (37.7 °C) -- -- -- --   02/03/23 2228 (!) 159/86 100.3 °F (37.9 °C) 95 16 97 % --   02/03/23 1941 (!) 163/87 99.2 °F (37.3 °C) (!) 103 20 100 % --   02/03/23 1612 (!) 171/71 -- 96 -- 99 % --   02/03/23 1412 (!) 154/83 98.7 °F (37.1 °C) 95 22 99 % --   02/03/23 1245 -- -- 94 20 97 % --   02/03/23 1230 (!) 142/89 -- 98 25 95 % --   02/03/23 1226 -- -- 92 17 96 % --   02/03/23 1225 -- -- 90 25 97 % --   02/03/23 1224 -- -- 89 25 96 % --   02/03/23 1223 -- -- 88 18 95 % --   02/03/23 1222 -- -- 92 17 96 % --   02/03/23 1221 -- -- 93 21 96 % --   02/03/23 1219 -- -- -- 19 -- --   02/03/23 1218 -- -- -- 18 -- --   02/03/23 1217 -- -- -- (!) 31 -- --   02/03/23 1216 -- -- -- 15 -- --   02/03/23 1215 -- -- 92 22 97 % --   02/03/23 1200 (!) 149/80 -- 97 25 97 % --   02/03/23 1145 -- -- 87 26 97 % --   02/03/23 1130 (!) 140/82 -- 93 26 93 % --       Intake/Output Summary (Last 24 hours) at 2/4/2023 1123  Last data filed at 2/4/2023 6421  Gross per 24 hour   Intake --   Output 2800 ml   Net -2800 ml     Patient Vitals for the past 120 hrs:   Weight   02/01/23 1438 149.7 kg (330 lb)   02/02/23 1002 149.7 kg (330 lb)   02/02/23 1228 149.7 kg (330 lb)   02/04/23 0345 157.2 kg (346 lb 9 oz)         Physical Exam  Vitals and nursing note reviewed. Constitutional:       General: He is in acute distress. HENT:      Mouth/Throat:      Mouth: Mucous membranes are moist.   Eyes:      Extraocular Movements: Extraocular movements intact. Pupils: Pupils are equal, round, and reactive to light. Cardiovascular:      Rate and Rhythm: Normal rate and regular rhythm. Heart sounds: Normal heart sounds. Pulmonary:      Effort: Pulmonary effort is normal. No respiratory distress. Breath sounds: Normal breath sounds. Abdominal:      General: Bowel sounds are normal. There is no distension. Palpations: Abdomen is soft. Tenderness: There is no abdominal tenderness. There is no rebound. Musculoskeletal:         General: Normal range of motion. Cervical back: Normal range of motion and neck supple. Skin:     General: Skin is warm and dry. Neurological:      Mental Status: He is alert. Intake and Output:  Current Shift:  No intake/output data recorded.   Last three shifts:  02/02 1901 - 02/04 0700  In: 1685.4 [I.V.:1685.4]  Out: 1407 [Urine:6975]    Lab/Data Reviewed:  Recent Results (from the past 8 hour(s))   GLUCOSE, POC    Collection Time: 02/04/23  7:46 AM   Result Value Ref Range    Glucose (POC) 141 (H) 70 - 110 mg/dL     Medications:  Current Facility-Administered Medications   Medication Dose Route Frequency    vancomycin (VANCOCIN) 1,000 mg in 0.9% sodium chloride 250 mL (VIAL-MATE)  1,000 mg IntraVENous Q8H    insulin glargine (LANTUS) injection 6 Units  6 Units SubCUTAneous DAILY    cefTRIAXone (ROCEPHIN) 2 g in 0.9% sodium chloride (MBP/ADV) 50 mL MBP  2 g IntraVENous Q24H    acetaminophen (TYLENOL) tablet 650 mg  650 mg Oral Q6H PRN    oxyCODONE IR (ROXICODONE) tablet 5 mg  5 mg Oral Q4H PRN    insulin lispro (HUMALOG) injection   SubCUTAneous AC&HS    glucose chewable tablet 16 g  4 Tablet Oral PRN    glucagon (GLUCAGEN) injection 1 mg  1 mg IntraMUSCular PRN    dextrose 10% infusion 0-250 mL  0-250 mL IntraVENous PRN    dorzolamide-timoloL (COSOPT) 22.3-6.8 mg/mL ophthalmic solution 1 Drop  1 Drop Both Eyes BID    sodium chloride (NS) flush 5-10 mL  5-10 mL IntraVENous PRN    Vanocmycin - Rx to dose and monitor  1 Each Other Rx Dosing/Monitoring    heparin (porcine) injection 5,000 Units  5,000 Units SubCUTAneous Q8H    pantoprazole (PROTONIX) tablet 40 mg  40 mg Oral ACB       Recent Results (from the past 24 hour(s))   GLUCOSE, POC    Collection Time: 02/03/23  4:36 PM   Result Value Ref Range    Glucose (POC) 181 (H) 70 - 110 mg/dL   GLUCOSE, POC    Collection Time: 02/03/23  9:06 PM   Result Value Ref Range    Glucose (POC) 151 (H) 70 - 110 mg/dL   CBC W/O DIFF    Collection Time: 02/04/23  2:37 AM   Result Value Ref Range    WBC 17.0 (H) 4.6 - 13.2 K/uL    RBC 5.00 4.35 - 5.65 M/uL    HGB 13.8 13.0 - 16.0 g/dL    HCT 42.6 36.0 - 48.0 %    MCV 85.2 78.0 - 100.0 FL    MCH 27.6 24.0 - 34.0 PG    MCHC 32.4 31.0 - 37.0 g/dL    RDW 13.7 11.6 - 14.5 %    PLATELET 740 521 - 943 K/uL    MPV 11.0 9.2 - 11.8 FL    NRBC 0.0 0  WBC    ABSOLUTE NRBC 0.00 0.00 - 5.13 K/uL   METABOLIC PANEL, BASIC    Collection Time: 02/04/23  2:37 AM   Result Value Ref Range    Sodium 137 136 - 145 mmol/L    Potassium 3.4 (L) 3.5 - 5.5 mmol/L    Chloride 102 100 - 111 mmol/L    CO2 26 21 - 32 mmol/L    Anion gap 9 3.0 - 18 mmol/L    Glucose 162 (H) 74 - 99 mg/dL    BUN 19 (H) 7.0 - 18 MG/DL    Creatinine 1.61 (H) 0.6 - 1.3 MG/DL    BUN/Creatinine ratio 12 12 - 20      eGFR 51 (L) >60 ml/min/1.73m2    Calcium 8.5 8.5 - 10.1 MG/DL   VANCOMYCIN, RANDOM    Collection Time: 02/04/23  2:37 AM   Result Value Ref Range    Vancomycin, random 7.8 5.0 - 40.0 UG/ML   GLUCOSE, POC    Collection Time: 02/04/23  7:46 AM   Result Value Ref Range    Glucose (POC) 141 (H) 70 - 110 mg/dL       Procedures/imaging: see electronic medical records for all procedures/Xrays and details which were not copied into this note but were reviewed prior to creation of Alexander Yancey MD   2/4/2023, 11:23 AM

## 2023-02-04 NOTE — PROGRESS NOTES
Problem: Diabetes Self-Management  Goal: *Using medications safely  Description: State effect of diabetes medications on diabetes; name diabetes medication taking, action and side effects. Outcome: Progressing Towards Goal  Goal: *Monitoring blood glucose, interpreting and using results  Description: Identify recommended blood glucose targets  and personal targets. Outcome: Progressing Towards Goal     Problem: Falls - Risk of  Goal: *Absence of Falls  Description: Document Eudelia Romberg Fall Risk and appropriate interventions in the flowsheet.   Outcome: Progressing Towards Goal  Note: Fall Risk Interventions:  Mobility Interventions: Communicate number of staff needed for ambulation/transfer, Patient to call before getting OOB         Medication Interventions: Evaluate medications/consider consulting pharmacy, Patient to call before getting OOB, Teach patient to arise slowly    Elimination Interventions: Call light in reach, Patient to call for help with toileting needs, Toilet paper/wipes in reach, Urinal in reach    History of Falls Interventions: Door open when patient unattended, Evaluate medications/consider consulting pharmacy

## 2023-02-04 NOTE — PROGRESS NOTES
Pt complains of pain and there was not an active order in pt's chart except for roxicodone. Pt states he cannot take roxicodone due to severe sweating and nausea. MD Lilly Cantrell was paged. MD Lilly Cantrell started pt on dilaudid IV. Pt notified.

## 2023-02-04 NOTE — PERIOP NOTES
[address] [place] [address],[address] of [facility] Bibiana Oviedo  (NP)  2022 11:40:15 Danielle Garibay  (NP)  2022 06:39:28

## 2023-02-04 NOTE — ANESTHESIA PREPROCEDURE EVALUATION
Relevant Problems   RESPIRATORY SYSTEM   (+) SHAHRIAR (obstructive sleep apnea)      RENAL FAILURE   (+) VIKTOR (acute kidney injury) ([facility]      ENDOCRINE   (+) Controlled type 2 diabetes mellitus, without long-term current use of insulin (HCC)       Anesthetic History        Pertinent negatives: No increased risk of difficult airway, PONV, pseudocholinesterase deficiency, malignant hyperthermia and history of awareness of surgery under anesthesia  Comments: Low BPs with back surgery that resulted in 70% vision loss     Review of Systems / Medical History  Patient summary reviewed, nursing notes reviewed and pertinent labs reviewed    Pulmonary        Sleep apnea: CPAP      Pertinent negatives: No COPD, asthma, recent URI and smoker     Neuro/Psych   Within defined limits           Cardiovascular    Hypertension: well controlled            Pertinent negatives: No past MI, CAD, dysrhythmias, angina and CHF  Exercise tolerance: <4 METS  Comments: Mild pulm HTN   GI/Hepatic/Renal     GERD: well controlled    Renal disease: ARF    Pertinent negatives: No hepatitis and liver disease  Comments: Food specific GERD Endo/Other    Diabetes    Morbid obesity and arthritis  Pertinent negatives: No hypothyroidism, hyperthyroidism and blood dyscrasia   Other Findings   Comments: Cellulitis right thigh           Physical Exam    Airway  Mallampati: III  TM Distance: 4 - 6 cm  Neck ROM: normal range of motion   Mouth opening: Normal     Cardiovascular  Regular rate and rhythm,  S1 and S2 normal,  no murmur, click, rub, or gallop             Dental  No notable dental hx       Pulmonary  Breath sounds clear to auscultation               Abdominal  GI exam deferred       Other Findings            Anesthetic Plan    ASA: 3, emergent  Anesthesia type: general          Induction: Intravenous and RSI  Anesthetic plan and risks discussed with: Patient and Family      GA/OETT/RSI. Blood glucose 134 @ 1139.

## 2023-02-04 NOTE — PROGRESS NOTES
Assessment:     Cayla Guadarrama is a 46y.o. year old male  hypertension, GERD, sleep apnea, Type 2 diabetes mellitus, chronic back pain  presented with right thigh wound, redness, swelling. He was seen by PCP  and clindamycin started ,For last few days swelling and pain worsening so presented in ED . He was found to have hypotension, leukocytosis , elevated lactate suggestive of septic shock due to cellulitis      He has been taking NSAIDS for long time and reports Hypertension and DM for long duration   On review of renal trajectory he has mckenna renal function on 1/20/23 with creatinine 1.1 mg /dl   No hydronephrosis in [facility]      Now he has Acute renal failure with creatinine 3.3 mg/dl on 2/2/23   - this is ATN caused by septic shock   Hypertension  Diabetes  SHAHRIAR   Obesity   Septic shock - improving   Will follow up in am.     Plan:   Continue conservative management and treating the infection  Replete K   10 mew IV times 3    CC: ARF,       Pt is being wheeled to OR. Blood pressure (!) 145/94, pulse 80, temperature 99.3 °F (37.4 °C), resp. rate 18, height 5' 9\" (1.753 m), weight 157.2 kg (346 lb 9 oz), SpO2 99 %.           Intake/Output Summary (Last 24 hours) at 2/4/2023 1410  Last data filed at 2/4/2023 8615  Gross per 24 hour   Intake --   Output 2800 ml   Net -2800 ml      Recent Labs     02/04/23  0237   WBC 17.0*     Lab Results   Component Value Date/Time    Sodium 137 02/04/2023 02:37 AM    Potassium 3.4 (L) 02/04/2023 02:37 AM    Chloride 102 02/04/2023 02:37 AM    CO2 26 02/04/2023 02:37 AM    Anion gap 9 02/04/2023 02:37 AM    Glucose 162 (H) 02/04/2023 02:37 AM    BUN 19 (H) 02/04/2023 02:37 AM    Creatinine 1.61 (H) 02/04/2023 02:37 AM    BUN/Creatinine ratio 12 02/04/2023 02:37 AM    GFR est AA >60 01/20/2016 09:45 AM    GFR est non-AA >60 01/20/2016 09:45 AM    Calcium 8.5 02/04/2023 02:37 AM        Current Facility-Administered Medications   Medication Dose Route Frequency Provider Last Rate Last Admin    vancomycin (VANCOCIN) 1,000 mg in 0.9% sodium chloride 250 mL (VIAL-MATE)  1,000 mg IntraVENous Q8H Robert Wylie  mL/hr at 02/04/23 0642 1,000 mg at 02/04/23 6654    insulin glargine (LANTUS) injection 6 Units  6 Units SubCUTAneous DAILY Letitia Koenig MD   6 Units at 02/04/23 3253    cefTRIAXone (ROCEPHIN) 2 g in 0.9% sodium chloride (MBP/ADV) 50 mL MBP  2 g IntraVENous Q24H Gina Chavez  mL/hr at 02/04/23 1200 2 g at 02/04/23 1200    acetaminophen (TYLENOL) tablet 650 mg  650 mg Oral Q6H PRN Jonathan Laguna MD   650 mg at 02/04/23 1215    oxyCODONE IR (ROXICODONE) tablet 5 mg  5 mg Oral Q4H PRN Jonathan Laguna MD        insulin lispro (HUMALOG) injection   SubCUTAneous AC&HS Letitia Koenig MD   2 Units at 02/03/23 2239    glucose chewable tablet 16 g  4 Tablet Oral PRN Letitia Koenig MD        glucagon (GLUCAGEN) injection 1 mg  1 mg IntraMUSCular PRN Letitia Koenig MD        dextrose 10% infusion 0-250 mL  0-250 mL IntraVENous PRN Letitia Koenig MD        dorzolamide-timoloL (COSOPT) 22.3-6.8 mg/mL ophthalmic solution 1 Drop  1 Drop Both Eyes BID Letitia Koenig MD   1 Drop at 02/04/23 0836    sodium chloride (NS) flush 5-10 mL  5-10 mL IntraVENous PRN Robert Wylie MD        Vanocmycin - Rx to dose and monitor  1 Each Other Rx Dosing/Monitoring Robert Wylie MD        heparin (porcine) injection 5,000 Units  5,000 Units SubCUTAneous Q8H Iris SEBASTIAN MD   5,000 Units at 02/03/23 1232    pantoprazole (PROTONIX) tablet 40 mg  40 mg Oral ACB Lenard Hollins MD   40 mg at 02/04/23 2205

## 2023-02-04 NOTE — ANESTHESIA POSTPROCEDURE EVALUATION
Post-Anesthesia Evaluation Note    Patient: Sadaf Romero MRN: [id number]  SSN: [id number]    YOB: 1970  Age: 46 y.o. Sex: male         Vital signs in patient's normal range: Yes  Respiratory function stable airway patent: Yes  Cardiovascular function and hydration status stable: Yes  Pain control satisfactory: Yes  Nausea and vomiting control satisfactory: Yes    Patient awake and participates in evaluation: Yes    Procedure(s):  INCISION AND DRAINAGE RIGHT THIGH. general    <BSHSIANPOST>    INITIAL Post-op Vital signs:   Vitals Value Taken Time   /90 02/04/23 1555   Temp 36.2 °C (97.2 °F) 02/04/23 1548   Pulse 93 02/04/23 1557   Resp 17 02/04/23 1557   SpO2 97 % 02/04/23 1557   Vitals shown include unvalidated device data.

## 2023-02-04 NOTE — CONSULTS
[address] [facility]    Name:  José Camara  MR#:   [id number]  :  1970  ACCOUNT #:  [de-identified]  DATE OF SERVICE:  2023    GENERAL SURGERY CONSULTATION AND PREOPERATIVE NOTE    REASON FOR CONSULTATION:  Right thigh abscess with surrounding cellulitis, acidosis, and sepsis. HISTORY OF PRESENT ILLNESS:  The patient is a 59-year-old white male with a history of morbid obesity, diabetes, admitted to the Hospitalist's Service two days ago with a several day history of increasing pain, redness, and swelling involving his proximal right thigh. Initial CT showed an ill-defined phlegmon in the proximal anterior right thigh, possibly involving the right sartorius muscle, but without drainable abscess. His white count was significantly elevated. While that white count has improved, it has remained elevated. He was initially hypotensive in the ER, but that also has improved. Lactic acid was also elevated initially at 3.3. Since admission, the discomfort has improved, but really has become more focal in the proximal right thigh. He denies any previous similar episode in this area, although he does admit to having small little \"boils\" lanced in the past in various areas of his body. He denies any chest pain, shortness of breath, or other symptoms at this time. PAST MEDICAL HISTORY:  Diabetes, sleep apnea, morbid obesity, chronic back pain, arthritis. PAST SURGICAL HISTORY:  Lumbar back surgery, right ankle surgery, multiple incision and drainage, for superficial abscess, procedures. MEDICATIONS:  Please see MedRec sheet. I do not see he is on any blood thinners or antiplatelet medications. ALLERGIES:  NO KNOWN DRUG ALLERGIES. SOCIAL HISTORY:  He does not smoke or use tobacco products. Rarely drinks alcohol. He does not use any illicit drugs. FAMILY HISTORY:  Noncontributory.     REVIEW OF SYSTEMS:  A 12-point review of systems was reviewed with the patient, negative apart from that listed in the HPI. PHYSICAL EXAMINATION:  GENERAL:  He is a well developed, super morbidly obese with a BMI of 48.73, not in any acute distress. VITAL SIGNS:  He has been afebrile for more than the last 24 hours. Last temperature taken 98.7, blood pressure at that time 154/83; pulse 95; saturating 99% to 100% on room air, respirations 22. HEENT:  Normocephalic, atraumatic. Pupils equal, round, and reactive to light and accommodation. Extraocular movements intact. Sclerae anicteric bilaterally. NECK:  Supple. No JVD. CARDIOVASCULAR:  Regular rate and rhythm. LUNGS:  Clear to auscultation, slightly decreased at bilateral bases. ABDOMEN:  Soft, nontender. Exam is significantly limited due to the patient's body habitus. EXTREMITIES:  Lower extremity edema noted. No calf tenderness. Good capillary refill. SKIN/INTEGUMENT:  Limited to the proximal anterior right thigh, shows a fairly large area of induration/edema and erythema, which by previously marked out periphery has decreased since admission. Exam is significantly limited by the patient's body habitus. No crepitus is noted. ANCILLARY STUDIES:  White count on admission was elevated at 28.1, improved, till yesterday 21.7; today, it has continued to improve, 16.6. H and H have been essentially stable throughout his stay, decreasing slightly likely with dilution from IV fluid only. Last recorded today 12.5 and 39.6. Platelets normal at 188. BMP at admission showed glucose elevated at 184, BUN and creatinine elevated at 25 and 3.26. Glucose is still elevated on this morning's BMP, 154. Bicarb is normal.  BUN and creatinine have improved to 23 and 1.80. Lactic acid on admission has been elevated at 3.3, improved to the following day at 1.5. No repeat has been taken.     RADIOLOGY:  CT on admission showed right thigh cellulitis with ill-defined phlegmon in the proximal anterior right thigh involving the right sartorius muscle, but no drainable abscess. Ultrasound done today, midday, showed an approximately 7.9 x 7.9 x 2.7 cm amorphous mild complex collection in the subcutaneous soft tissues with surrounding induration and interstitial edema of the subcutaneous fat layer. ASSESSMENT:  A 60-year-old white male with;  1.  Right proximal anterior thigh complex abscess with surrounding cellulitis. 2.  Acidosis, improving. 3.  Sepsis, improving. 4.  Elevated white count secondary to #1, improving. 5.  Super morbid obesity. Body mass index 48.73. PLAN:  As he has already eaten his dinner today, he will be made n.p.o. after midnight for OR tomorrow. That procedure would be incision, drainage, and possible debridement of right inferior thigh abscess/cellulitis. I discussed with the patient the nature of surgery, alternatives, benefits, and risks. The risks include, but are not limited to, medication reaction, anesthesia complications, cardiac or pulmonary complications including death, possible need for the patient to be intubated after surgery, inadvertant injury to nerves or blood vessels resulting in motor or sensory deficit or bleeding, recurrent/persistent infection requiring additional surgery and other measures, need to leave the wound open to heal by secondary intention postop, need for additional surgery, poor wound healing/scarring, postoperative pain, etc.      The patient understands these risks and is willing to give informed consent to proceed with surgery.       Agnieszka Kapadia MD      [id number]  D:  02/03/2023 17:05  T:  02/04/2023 1:10  JOB #:  [id number]  CC:  Valarie Paris MD

## 2023-02-04 NOTE — PERIOP NOTES
TRANSFER - OUT REPORT:    Verbal report given to [facility]erg[facility] with [facility] RN(name) on Giselle Michaels  being transferred to [address] [place] [address](unit) for routine progression of care       Report consisted of patients Situation, Background, Assessment and   Recommendations(SBAR). Information from the following report(s) OR Summary, Procedure Summary, Intake/Output, and MAR was reviewed with the receiving nurse. Lines:       Opportunity for questions and clarification was provided.       Patient transported with:   Registered Nurse

## 2023-02-05 LAB
ANION GAP SERPL CALC-SCNC: 6 MMOL/L (ref 3–18)
BUN SERPL-MCNC: 21 MG/DL (ref 7–18)
BUN/CREAT SERPL: 16 (ref 12–20)
CALCIUM SERPL-MCNC: 8.8 MG/DL (ref 8.5–10.1)
CALCULATED R AXIS, ECG10: 25 DEGREES
CALCULATED R AXIS, ECG10: 38 DEGREES
CALCULATED T AXIS, ECG11: 11 DEGREES
CALCULATED T AXIS, ECG11: 5 DEGREES
CHLORIDE SERPL-SCNC: 104 MMOL/L (ref 100–111)
CO2 SERPL-SCNC: 28 MMOL/L (ref 21–32)
CREAT SERPL-MCNC: 1.29 MG/DL (ref 0.6–1.3)
DIAGNOSIS, 93000: NORMAL
DIAGNOSIS, 93000: NORMAL
ERYTHROCYTE [DISTWIDTH] IN BLOOD BY AUTOMATED COUNT: 14.1 % (ref 11.6–14.5)
GLUCOSE BLD STRIP.AUTO-MCNC: 158 MG/DL (ref 70–110)
GLUCOSE BLD STRIP.AUTO-MCNC: 162 MG/DL (ref 70–110)
GLUCOSE BLD STRIP.AUTO-MCNC: 200 MG/DL (ref 70–110)
GLUCOSE BLD STRIP.AUTO-MCNC: 241 MG/DL (ref 70–110)
GLUCOSE SERPL-MCNC: 207 MG/DL (ref 74–99)
HCT VFR BLD AUTO: 43.1 % (ref 36–48)
HGB BLD-MCNC: 13.4 G/DL (ref 13–16)
MCH RBC QN AUTO: 27.6 PG (ref 24–34)
MCHC RBC AUTO-ENTMCNC: 31.1 G/DL (ref 31–37)
MCV RBC AUTO: 88.9 FL (ref 78–100)
NRBC # BLD: 0 K/UL (ref 0–0.01)
NRBC BLD-RTO: 0 PER 100 WBC
PLATELET # BLD AUTO: 261 K/UL (ref 135–420)
PMV BLD AUTO: 10.6 FL (ref 9.2–11.8)
POTASSIUM SERPL-SCNC: 4.7 MMOL/L (ref 3.5–5.5)
Q-T INTERVAL, ECG07: 404 MS
Q-T INTERVAL, ECG07: 406 MS
QRS DURATION, ECG06: 86 MS
QRS DURATION, ECG06: 90 MS
QTC CALCULATION (BEZET), ECG08: 389 MS
QTC CALCULATION (BEZET), ECG08: 391 MS
RBC # BLD AUTO: 4.85 M/UL (ref 4.35–5.65)
SODIUM SERPL-SCNC: 138 MMOL/L (ref 136–145)
VENTRICULAR RATE, ECG03: 56 BPM
VENTRICULAR RATE, ECG03: 56 BPM
WBC # BLD AUTO: 16.8 K/UL (ref 4.6–13.2)

## 2023-02-05 PROCEDURE — 82962 GLUCOSE BLOOD TEST: CPT

## 2023-02-05 PROCEDURE — 77030040393 HC DRSG OPTIFOAM GENT MDII -B

## 2023-02-05 PROCEDURE — 80048 BASIC METABOLIC PNL TOTAL CA: CPT

## 2023-02-05 PROCEDURE — 74011250636 HC RX REV CODE- 250/636: Performed by: SURGERY

## 2023-02-05 PROCEDURE — 36415 COLL VENOUS BLD VENIPUNCTURE: CPT

## 2023-02-05 PROCEDURE — 74011000258 HC RX REV CODE- 258: Performed by: SURGERY

## 2023-02-05 PROCEDURE — 65270000029 HC RM PRIVATE

## 2023-02-05 PROCEDURE — 74011250637 HC RX REV CODE- 250/637: Performed by: INTERNAL MEDICINE

## 2023-02-05 PROCEDURE — 74011250636 HC RX REV CODE- 250/636: Performed by: INTERNAL MEDICINE

## 2023-02-05 PROCEDURE — 85027 COMPLETE CBC AUTOMATED: CPT

## 2023-02-05 PROCEDURE — 74011000250 HC RX REV CODE- 250: Performed by: SURGERY

## 2023-02-05 PROCEDURE — 74011636637 HC RX REV CODE- 636/637: Performed by: SURGERY

## 2023-02-05 PROCEDURE — 74011250637 HC RX REV CODE- 250/637: Performed by: SURGERY

## 2023-02-05 RX ORDER — VERAPAMIL HYDROCHLORIDE 120 MG/1
120 TABLET, FILM COATED ORAL 3 TIMES DAILY
Status: DISCONTINUED | OUTPATIENT
Start: 2023-02-05 | End: 2023-02-05

## 2023-02-05 RX ORDER — BUPROPION HYDROCHLORIDE 150 MG/1
300 TABLET ORAL DAILY
Status: DISCONTINUED | OUTPATIENT
Start: 2023-02-06 | End: 2023-02-09 | Stop reason: HOSPADM

## 2023-02-05 RX ORDER — VERAPAMIL HYDROCHLORIDE 120 MG/1
120 TABLET, FILM COATED ORAL 2 TIMES DAILY
Status: DISCONTINUED | OUTPATIENT
Start: 2023-02-05 | End: 2023-02-06

## 2023-02-05 RX ORDER — ROSUVASTATIN CALCIUM 10 MG/1
40 TABLET, COATED ORAL
Status: DISCONTINUED | OUTPATIENT
Start: 2023-02-05 | End: 2023-02-09 | Stop reason: HOSPADM

## 2023-02-05 RX ORDER — METOPROLOL TARTRATE 50 MG/1
100 TABLET ORAL 2 TIMES DAILY
Status: DISCONTINUED | OUTPATIENT
Start: 2023-02-05 | End: 2023-02-05

## 2023-02-05 RX ADMIN — SODIUM CHLORIDE, PRESERVATIVE FREE 10 ML: 5 INJECTION INTRAVENOUS at 14:11

## 2023-02-05 RX ADMIN — VANCOMYCIN HYDROCHLORIDE 1000 MG: 1 INJECTION, POWDER, LYOPHILIZED, FOR SOLUTION INTRAVENOUS at 15:24

## 2023-02-05 RX ADMIN — HYDROMORPHONE HYDROCHLORIDE 1 MG: 1 INJECTION, SOLUTION INTRAMUSCULAR; INTRAVENOUS; SUBCUTANEOUS at 05:20

## 2023-02-05 RX ADMIN — Medication 2 UNITS: at 22:17

## 2023-02-05 RX ADMIN — Medication 4 UNITS: at 14:07

## 2023-02-05 RX ADMIN — Medication 4 UNITS: at 09:01

## 2023-02-05 RX ADMIN — HEPARIN SODIUM 5000 UNITS: 5000 INJECTION INTRAVENOUS; SUBCUTANEOUS at 14:07

## 2023-02-05 RX ADMIN — PANTOPRAZOLE SODIUM 40 MG: 40 TABLET, DELAYED RELEASE ORAL at 09:00

## 2023-02-05 RX ADMIN — VERAPAMIL HYDROCHLORIDE 120 MG: 120 TABLET ORAL at 20:38

## 2023-02-05 RX ADMIN — VANCOMYCIN HYDROCHLORIDE 1000 MG: 1 INJECTION, POWDER, LYOPHILIZED, FOR SOLUTION INTRAVENOUS at 22:17

## 2023-02-05 RX ADMIN — CEFTRIAXONE 2 G: 2 INJECTION, POWDER, FOR SOLUTION INTRAMUSCULAR; INTRAVENOUS at 14:07

## 2023-02-05 RX ADMIN — DORZOLAMIDE HYDROCHLORIDE AND TIMOLOL MALEATE 1 DROP: 20; 5 SOLUTION/ DROPS OPHTHALMIC at 20:58

## 2023-02-05 RX ADMIN — ROSUVASTATIN CALCIUM 40 MG: 10 TABLET, COATED ORAL at 22:16

## 2023-02-05 RX ADMIN — Medication 2 UNITS: at 18:02

## 2023-02-05 RX ADMIN — DORZOLAMIDE HYDROCHLORIDE AND TIMOLOL MALEATE 1 DROP: 20; 5 SOLUTION/ DROPS OPHTHALMIC at 14:20

## 2023-02-05 RX ADMIN — VANCOMYCIN HYDROCHLORIDE 1000 MG: 1 INJECTION, POWDER, LYOPHILIZED, FOR SOLUTION INTRAVENOUS at 05:09

## 2023-02-05 RX ADMIN — HYDROMORPHONE HYDROCHLORIDE 1 MG: 1 INJECTION, SOLUTION INTRAMUSCULAR; INTRAVENOUS; SUBCUTANEOUS at 22:16

## 2023-02-05 RX ADMIN — HEPARIN SODIUM 5000 UNITS: 5000 INJECTION INTRAVENOUS; SUBCUTANEOUS at 05:09

## 2023-02-05 RX ADMIN — Medication 6 UNITS: at 09:03

## 2023-02-05 RX ADMIN — HYDROMORPHONE HYDROCHLORIDE 1 MG: 1 INJECTION, SOLUTION INTRAMUSCULAR; INTRAVENOUS; SUBCUTANEOUS at 18:01

## 2023-02-05 RX ADMIN — HYDROMORPHONE HYDROCHLORIDE 1 MG: 1 INJECTION, SOLUTION INTRAMUSCULAR; INTRAVENOUS; SUBCUTANEOUS at 09:00

## 2023-02-05 RX ADMIN — HEPARIN SODIUM 5000 UNITS: 5000 INJECTION INTRAVENOUS; SUBCUTANEOUS at 20:39

## 2023-02-05 RX ADMIN — HYDROMORPHONE HYDROCHLORIDE 1 MG: 1 INJECTION, SOLUTION INTRAMUSCULAR; INTRAVENOUS; SUBCUTANEOUS at 14:18

## 2023-02-05 NOTE — PROGRESS NOTES
Bedside report received on pt, no complaint voiced, pain level tolerable at this moment, pt educated on pain med, he verbalized understanding. Dressing to right thigh with minimal drainage, dressing marked for observation, will re-enforced as needed and per order. Call light within pt's reach.     2154: right thigh dressing re-enforced, pt tolerated well

## 2023-02-05 NOTE — PROGRESS NOTES
POD#1  Pt feeling better  AVSS    R groin I&D site clean w dressing    WBC stable/16.8    Stable s/p I&D R groin abscess  Wound care consulted -- start daily packing changes tomorrow  ID following -- follow up wound cx  OOB  IM care  Signing off, re-consult prn

## 2023-02-05 NOTE — PROGRESS NOTES
Assessment:     Tish Love is a 46y.o. year old male  hypertension, GERD, sleep apnea, Type 2 diabetes mellitus, chronic back pain  presented with right thigh wound, redness, swelling. He was seen by PCP  and clindamycin started ,For last few days swelling and pain worsening so presented in ED . He was found to have hypotension, leukocytosis , elevated lactate suggestive of septic shock due to cellulitis      He has been taking NSAIDS for long time and reports Hypertension and DM for long duration   On review of renal trajectory he has mckenna renal function on 1/20/23 with creatinine 1.1 mg /dl   No hydronephrosis in [facility]     Hypertension  Diabetes  SHAHRIAR   Obesity   Septic shock - improving   Will follow up in am.      Plan:   GFR is improving  D/w pt, he would like to see us as out pt  Will sign off for now please call us with any questions    CC: ARF,        Review of Systems  Negative for  SOB,  Nausea, vomiting        Blood pressure (!) 164/93, pulse 70, temperature 97.9 °F (36.6 °C), resp. rate 20, height 5' 9\" (1.753 m), weight (!) 161 kg (354 lb 15.1 oz), SpO2 96 %.       NAD, Conversant  No edema  Psychicatric: good judgment and insights, alert and oreinted        Intake/Output Summary (Last 24 hours) at 2/5/2023 1515  Last data filed at 2/5/2023 1117  Gross per 24 hour   Intake 1320 ml   Output 2150 ml   Net -830 ml      Recent Labs     02/05/23  0720   WBC 16.8*     Lab Results   Component Value Date/Time    Sodium 138 02/05/2023 07:20 AM    Potassium 4.7 02/05/2023 07:20 AM    Chloride 104 02/05/2023 07:20 AM    CO2 28 02/05/2023 07:20 AM    Anion gap 6 02/05/2023 07:20 AM    Glucose 207 (H) 02/05/2023 07:20 AM    BUN 21 (H) 02/05/2023 07:20 AM    Creatinine 1.29 02/05/2023 07:20 AM    BUN/Creatinine ratio 16 02/05/2023 07:20 AM    GFR est AA >60 01/20/2016 09:45 AM    GFR est non-AA >60 01/20/2016 09:45 AM    Calcium 8.8 02/05/2023 07:20 AM        Current Facility-Administered Medications   Medication Dose Route Frequency Provider Last Rate Last Admin    [START ON 2/6/2023] Vancomycin Random Level 02/06/23 with AM labs  1 Each Other ONCE Perico Jama MD        vancomycin (VANCOCIN) 1,000 mg in 0.9% sodium chloride 250 mL (VIAL-MATE)  1,000 mg IntraVENous Q8H Post, Christie Taylor  mL/hr at 02/05/23 0509 1,000 mg at 02/05/23 0509    oxyCODONE IR (ROXICODONE) tablet 5 mg  5 mg Oral Q4H PRN Jose Pal MD        HYDROmorphone (DILAUDID) injection 1 mg  1 mg IntraVENous Q4H PRN Yola Brothers DO   1 mg at 02/05/23 1418    insulin glargine (LANTUS) injection 6 Units  6 Units SubCUTAneous DAILY Post, Christie Taylor MD   6 Units at 02/05/23 4814    cefTRIAXone (ROCEPHIN) 2 g in 0.9% sodium chloride (MBP/ADV) 50 mL MBP  2 g IntraVENous Q24H Jose Pal  mL/hr at 02/05/23 1407 2 g at 02/05/23 1407    acetaminophen (TYLENOL) tablet 650 mg  650 mg Oral Q6H PRN Jose Pal MD   650 mg at 02/04/23 1215    insulin lispro (HUMALOG) injection   SubCUTAneous BRITTANY&HS Jose Pal MD   4 Units at 02/05/23 1407    glucose chewable tablet 16 g  4 Tablet Oral PRN oJse Pal MD        glucagon (GLUCAGEN) injection 1 mg  1 mg IntraMUSCular PRN Jose Pal MD        dextrose 10% infusion 0-250 mL  0-250 mL IntraVENous OMAN Jose Pal MD        dorzolamide-timoloL (COSOPT) 22.3-6.8 mg/mL ophthalmic solution 1 Drop  1 Drop Both Eyes BID Jose Pal MD   1 Drop at 02/05/23 1420    sodium chloride (NS) flush 5-10 mL  5-10 mL IntraVENous PRN Jose Pal MD   10 mL at 02/05/23 1411    Vanocmycin - Rx to dose and monitor  1 Each Other Rx Dosing/Monitoring Jose Pal MD        heparin (porcine) injection 5,000 Units  5,000 Units SubCUTAneous Q8H Post, Christie Taylor MD   5,000 Units at 02/05/23 1407    pantoprazole (PROTONIX) tablet 40 mg  40 mg Oral ACB Jose Pal MD   40 mg at 02/05/23 0900    Total time spent 31 minutes

## 2023-02-05 NOTE — PROGRESS NOTES
[address] Pharmacokinetic Monitoring Service - Vancomycin    Consulting Provider: Mervyn Harada   Indication: SSTI  Target Concentration: Goal AUC/TIMOTHY 400-600 mg*hr/L  Day of Therapy: 5  Additional Antimicrobials: Rocephin    Pertinent Laboratory Values: Wt Readings from Last 1 Encounters:   02/05/23 (!) 161 kg (354 lb 15.1 oz)     Temp Readings from Last 1 Encounters:   02/05/23 97.2 °F (36.2 °C)     No components found for: PROCAL  Estimated Creatinine Clearance: 101.2 mL/min (based on SCr of 1.29 mg/dL).   Recent Labs     02/05/23  0720 02/04/23  0237   WBC 16.8* 17.0*       Plan:  Current dosing regimen is therapeutic  Vancomycin random level 02/06/23 with AM labs before increasing dose any further  Pharmacy will continue to monitor patient and adjust therapy as indicated      BOWEN Dozier  2/5/2023 9:27 AM

## 2023-02-05 NOTE — OP NOTES
[facility]  OPERATIVE REPORT    Name:  Cynthia Hensley  MR#:   [id number]  :  1970  ACCOUNT #:  [de-identified]  DATE OF SERVICE:  2023    GENERAL SURGERY OPERATIVE NOTE    PREOPERATIVE DIAGNOSIS:  Right proximal thigh multiloculated, deep subcutaneous abscess with surrounding cellulitis. POSTOPERATIVE DIAGNOSIS:  Right proximal thigh multiloculated, deep subcutaneous abscess with surrounding cellulitis. PROCEDURE PERFORMED:  Incision and drainage of right thigh abscess. SURGEON:  Kofi Beltrán MD.    ASSISTANT:  None. ANESTHESIA:  General.    COMPLICATIONS:  None. SPECIMENS REMOVED:  Abscess fluid for culture and sensitivity; anaerobic, aerobic, gram stain. DRAINS/IMPLANTS:  None (wound packed open with one-inch iodoform packing). ESTIMATED BLOOD LOSS:  Minimal.    PROCEDURE:  The patient is a 44-year-old white male with a history of morbid obesity and diabetes, admitted to the Hospitalist Service with right proximal thigh cellulitis, confirmed by CT with elevated white count. Ultrasound done subsequent to that did show a deep subcutaneous fluid collection, consistent with abscess. I discussed with him the nature of surgery. I recommended the alternatives, benefits, and risks, he gave consent to proceed. He was taken to the OR on 2023, met and identified in the preoperative holding area. After surgical site was marked, he was then brought to the operating room. He was already on antibiotics per Infectious Disease recommendation. No additional preoperative antibiotics were necessary. In the room, because of his BMI of 51, he was left on the transport stretcher. In the OR, general anesthesia was then administered with monitors and oxygen in place. Sequential compression devices were applied. The area was prepped and draped in the usual sterile fashion. After surgical pause, surgical site was identified.   I made a 2 x 2 cm cruciate incision at the inferior aspect of, what I felt to be, the flexure point of the abscess. The abscess started approximately 2 cm below the skin. Once we entered the abscess, we were able to express more than 100 mL of grossly purulent material mixed with some clot. This was sent for anaerobic and aerobic culture and sensitivity as well as gram stain. Then, with my finger, I did a blunt dissection. The abscess extended all the way down to the myofascial compartment, did not appear to grossly go beyond it. All subcutaneous septations and loculations were broken up with finger and suctioned. Abscess was suctioned out as completely as possible, and then the wound was irrigated with several 100 mL of normal saline using Roberto Carlos syringe. Irrigation was evacuated as completely as possible, as well. Hemostasis was satisfactory throughout the entire procedure. The wound was packed open with one-inch iodoform packing. The patient tolerated the entire procedure without incident. He was extubated in the OR and taken to recovery room postoperatively in stable condition.       Agnieszka Kapadia MD [id number]  D:  02/04/2023 15:10  T:  02/05/2023 0:53  JOB #:  [id number]

## 2023-02-05 NOTE — PROGRESS NOTES
Problem: Diabetes Self-Management  Goal: *Disease process and treatment process  Description: Define diabetes and identify own type of diabetes; list 3 options for treating diabetes. Outcome: Progressing Towards Goal  Goal: *Incorporating nutritional management into lifestyle  Description: Describe effect of type, amount and timing of food on blood glucose; list 3 methods for planning meals. Outcome: Progressing Towards Goal  Goal: *Incorporating physical activity into lifestyle  Description: State effect of exercise on blood glucose levels. Outcome: Progressing Towards Goal  Goal: *Developing strategies to promote health/change behavior  Description: Define the ABC's of diabetes; identify appropriate screenings, schedule and personal plan for screenings. Outcome: Progressing Towards Goal  Goal: *Using medications safely  Description: State effect of diabetes medications on diabetes; name diabetes medication taking, action and side effects. Outcome: Progressing Towards Goal  Goal: *Monitoring blood glucose, interpreting and using results  Description: Identify recommended blood glucose targets  and personal targets. Outcome: Progressing Towards Goal  Goal: *Prevention, detection, treatment of acute complications  Description: List symptoms of hyper- and hypoglycemia; describe how to treat low blood sugar and actions for lowering  high blood glucose level. Outcome: Progressing Towards Goal  Goal: *Prevention, detection and treatment of chronic complications  Description: Define the natural course of diabetes and describe the relationship of blood glucose levels to long term complications of diabetes.   Outcome: Progressing Towards Goal  Goal: *Developing strategies to address psychosocial issues  Description: Describe feelings about living with diabetes; identify support needed and support network  Outcome: Progressing Towards Goal  Goal: *Insulin pump training  Outcome: Progressing Towards Goal  Goal: *Sick day guidelines  Outcome: Progressing Towards Goal  Goal: *Patient Specific Goal (EDIT GOAL, INSERT TEXT)  Outcome: Progressing Towards Goal     Problem: Falls - Risk of  Goal: *Absence of Falls  Description: Document Mahamed Fall Risk and appropriate interventions in the flowsheet.   Outcome: Progressing Towards Goal  Note: Fall Risk Interventions:  Mobility Interventions: Communicate number of staff needed for ambulation/transfer, Patient to call before getting OOB         Medication Interventions: Evaluate medications/consider consulting pharmacy, Patient to call before getting OOB, Teach patient to arise slowly    Elimination Interventions: Call light in reach, Patient to call for help with toileting needs, Toilet paper/wipes in reach, Urinal in reach    History of Falls Interventions: Bed/chair exit alarm, Door open when patient unattended, Evaluate medications/consider consulting pharmacy

## 2023-02-05 NOTE — PROGRESS NOTES
0750 - Bedside shift report received from Boone Ramirez RN. Assumed care of patient. Patient noted resting in bed at this time. Call light in reach. 0183 - Assessment completed as per flowsheet. Patient alert and oriented x4. No s/s of any respiratory distress. ABD pad and tegaderm dressing intact to right thigh with moderate amount of old drainage noted. Mepilex intact to right lateral thigh. Reports pain to right thigh 6/10. PRN dilaudid administered. Patient resting in bed with call light in reach. 1418 - PRN Dilaudid administered. 1430 - Dressings to right thigh soiled and changed as per order. Wound packing remains in place. 1801 - PRN Dilaudid administered for pain relief.

## 2023-02-05 NOTE — PROGRESS NOTES
Hospitalist Progress Note    Patient: Gina Briceño MRN: [id number]  CSN: [id number]    YOB: 1970  Age: 46 y.o. Sex: male    DOA: 2/1/2023 LOS:  LOS: 4 days            Patient Active Problem List   Diagnosis Code    Lumbar stenosis M48.061    Cellulitis L03.90    Hypotension I95.9    Sepsis ([address] A41.9    VIKTOR (acute kidney injury) ([address] N17.9    Septic shock (HCC) A41.9, R65.21    SHAHRIAR (obstructive sleep apnea) G47.33    BMI 45.0-49.9, adult ([address] Z68.42    Controlled type 2 diabetes mellitus, without long-term current use of insulin (HCC) E11.9    Lactic acidosis E87.20    Abscess of right thigh L02.415        IMPRESSION and Plan:    Gina Briceño is a 46 y.o. male with   Patient Active Problem List    Diagnosis Date Noted    Abscess of right thigh 02/03/2023    Controlled type 2 diabetes mellitus, without long-term current use of insulin ([address] 02/02/2023    Lactic acidosis 02/02/2023    Cellulitis 02/01/2023    Hypotension 02/01/2023    Sepsis ([address] 02/01/2023    VIKTOR (acute kidney injury) ([address] 02/01/2023    Septic shock ([address] 02/01/2023    SHAHRIAR (obstructive sleep apnea) 02/01/2023    BMI 45.0-49.9, adult ([address] 02/01/2023    Lumbar stenosis 01/25/2016     Principal Problem:    Cellulitis (2/1/2023)    Active Problems:    Hypotension (2/1/2023)      Sepsis ([address] (2/1/2023)      VIKTOR (acute kidney injury) ([address] (2/1/2023)      Septic shock ([address] (2/1/2023)      SHAHRIAR (obstructive sleep apnea) (2/1/2023)      BMI 45.0-49.9, adult ([address] (2/1/2023)      Controlled type 2 diabetes mellitus, without long-term current use of insulin ([address] (2/2/2023)      Lactic acidosis (2/2/2023)      Abscess of right thigh (2/3/2023)    Chief complaint :  Sepsis, cellulitis. 46 y.o. male with hypertension, GERD, sleep apnea presents to ER with concerns of right thigh wound, redness, swelling. SHAHRIAR - had CPAP years ago, does not use CPAP.       Right proximal anterior thigh complex abscess with surrounding cellulitis  ANTIBIOTICS vancomycin, zosyn. ID consulted. -- s/p INCISION AND DRAINAGE RIGHT THIGH ABSCESS POD #1. Tolerated well. Cont current abx. Cx pending        Septic shock - IVF, impoved     Heme/onc - Follow H&H, plts. VIKTOR - cr is improved      DM-ii -- accuchecks and ssi       weakness -- oob pt/ot      Patient's condition is fair        Recommend to continue hospitalization. Discussed with patient. D/w family at bedside    Chief Complaints:   Chief Complaint   Patient presents with    Skin Problem     SUBJECTIVE:  Pt is seen and examined. S/p I and d    \" I feel much better after the surg\" less pain. Denies any pain        Review of systems:    Review of Systems   Constitutional:  Positive for malaise/fatigue. HENT: Negative. Eyes: Negative. Respiratory:  Negative for shortness of breath. Cardiovascular:  Negative for chest pain, palpitations, orthopnea and leg swelling. Gastrointestinal: Negative. Negative for abdominal pain, diarrhea and heartburn. Genitourinary:  Negative for dysuria and hematuria. Skin: Negative. Neurological:  Positive for weakness. Psychiatric/Behavioral:  Negative for depression, substance abuse and suicidal ideas. The patient is not nervous/anxious.       PE:  Patient Vitals for the past 24 hrs:   BP Temp Pulse Resp SpO2 Weight   02/05/23 0807 (!) 158/99 97.2 °F (36.2 °C) 70 18 99 % --   02/05/23 0551 -- -- -- -- -- (!) 161 kg (354 lb 15.1 oz)   02/05/23 0514 (!) 161/79 97.7 °F (36.5 °C) 75 20 98 % --   02/05/23 0059 (!) 158/85 97.8 °F (36.6 °C) 82 20 98 % --   02/04/23 1950 (!) 152/98 99 °F (37.2 °C) 95 18 95 % --   02/04/23 1627 (!) 144/89 98.7 °F (37.1 °C) 91 16 94 % --   02/04/23 1610 (!) 142/88 -- 85 18 95 % --   02/04/23 1609 -- -- 92 25 94 % --   02/04/23 1608 -- -- 90 22 94 % --   02/04/23 1605 -- -- 93 21 96 % --   02/04/23 1604 -- -- 89 25 97 % --   02/04/23 1602 -- -- 92 17 95 % --   02/04/23 1559 -- -- 95 14 96 % --   02/04/23 1558 -- -- 94 -- 97 % --   02/04/23 1555 (!) 138/90 -- 91 21 97 % --   02/04/23 1552 -- -- 88 23 95 % --   02/04/23 1548 (!) 142/83 97.2 °F (36.2 °C) 86 21 98 % --   02/04/23 1546 -- -- 86 17 96 % --   02/04/23 1544 -- -- 87 21 96 % --   02/04/23 1541 -- -- 85 20 95 % --   02/04/23 1540 (!) 143/92 -- 83 14 97 % --   02/04/23 1536 (!) 159/86 -- 82 18 98 % --   02/04/23 1534 (!) 143/92 97.2 °F (36.2 °C) 84 24 94 % --   02/04/23 1533 (!) 135/107 -- 85 20 96 % --   02/04/23 1530 (!) 140/88 -- 84 23 94 % --   02/04/23 1529 121/75 -- 83 21 93 % --   02/04/23 1525 (!) 106/96 97.1 °F (36.2 °C) (!) 115 16 99 % --   02/04/23 1521 106/86 97.1 °F (36.2 °C) (!) 118 22 96 % --   02/04/23 1137 (!) 145/94 99.3 °F (37.4 °C) 80 18 99 % --         Intake/Output Summary (Last 24 hours) at 2/5/2023 1027  Last data filed at 2/5/2023 0216  Gross per 24 hour   Intake 960 ml   Output 1275 ml   Net -315 ml       Patient Vitals for the past 120 hrs:   Weight   02/01/23 1438 149.7 kg (330 lb)   02/02/23 1002 149.7 kg (330 lb)   02/02/23 1228 149.7 kg (330 lb)   02/04/23 0345 157.2 kg (346 lb 9 oz)   02/05/23 0551 (!) 161 kg (354 lb 15.1 oz)           Physical Exam  Vitals and nursing note reviewed. Constitutional:       General: He is in acute distress. HENT:      Mouth/Throat:      Mouth: Mucous membranes are moist.   Eyes:      Extraocular Movements: Extraocular movements intact. Pupils: Pupils are equal, round, and reactive to light. Cardiovascular:      Rate and Rhythm: Normal rate and regular rhythm. Heart sounds: Normal heart sounds. Pulmonary:      Effort: Pulmonary effort is normal. No respiratory distress. Breath sounds: Normal breath sounds. Abdominal:      General: Bowel sounds are normal. There is no distension. Palpations: Abdomen is soft. Tenderness: There is no abdominal tenderness. There is no rebound. Musculoskeletal:         General: Normal range of motion.       Cervical back: Normal range of motion and neck supple. Skin:     General: Skin is warm and dry. Neurological:      Mental Status: He is alert. Intake and Output:  Current Shift:  No intake/output data recorded. Last three shifts:  02/03 1901 - 02/05 0700  In: 960 [P.O.:760;  I.V.:200]  Out: 3275 [Urine:3275]    Lab/Data Reviewed:  Recent Results (from the past 8 hour(s))   CBC W/O DIFF    Collection Time: 02/05/23  7:20 AM   Result Value Ref Range    WBC 16.8 (H) 4.6 - 13.2 K/uL    RBC 4.85 4.35 - 5.65 M/uL    HGB 13.4 13.0 - 16.0 g/dL    HCT 43.1 36.0 - 48.0 %    MCV 88.9 78.0 - 100.0 FL    MCH 27.6 24.0 - 34.0 PG    MCHC 31.1 31.0 - 37.0 g/dL    RDW 14.1 11.6 - 14.5 %    PLATELET 107 197 - 365 K/uL    MPV 10.6 9.2 - 11.8 FL    NRBC 0.0 0  WBC    ABSOLUTE NRBC 0.00 0.00 - 3.20 K/uL   METABOLIC PANEL, BASIC    Collection Time: 02/05/23  7:20 AM   Result Value Ref Range    Sodium 138 136 - 145 mmol/L    Potassium 4.7 3.5 - 5.5 mmol/L    Chloride 104 100 - 111 mmol/L    CO2 28 21 - 32 mmol/L    Anion gap 6 3.0 - 18 mmol/L    Glucose 207 (H) 74 - 99 mg/dL    BUN 21 (H) 7.0 - 18 MG/DL    Creatinine 1.29 0.6 - 1.3 MG/DL    BUN/Creatinine ratio 16 12 - 20      eGFR >60 >60 ml/min/1.73m2    Calcium 8.8 8.5 - 10.1 MG/DL   GLUCOSE, POC    Collection Time: 02/05/23  8:06 AM   Result Value Ref Range    Glucose (POC) 200 (H) 70 - 110 mg/dL     Medications:  Current Facility-Administered Medications   Medication Dose Route Frequency    [START ON 2/6/2023] Vancomycin Random Level 02/06/23 with AM labs  1 Each Other ONCE    vancomycin (VANCOCIN) 1,000 mg in 0.9% sodium chloride 250 mL (VIAL-MATE)  1,000 mg IntraVENous Q8H    oxyCODONE IR (ROXICODONE) tablet 5 mg  5 mg Oral Q4H PRN    HYDROmorphone (DILAUDID) injection 1 mg  1 mg IntraVENous Q4H PRN    insulin glargine (LANTUS) injection 6 Units  6 Units SubCUTAneous DAILY    cefTRIAXone (ROCEPHIN) 2 g in 0.9% sodium chloride (MBP/ADV) 50 mL MBP  2 g IntraVENous Q24H    acetaminophen (TYLENOL) tablet 650 mg  650 mg Oral Q6H PRN    insulin lispro (HUMALOG) injection   SubCUTAneous AC&HS    glucose chewable tablet 16 g  4 Tablet Oral PRN    glucagon (GLUCAGEN) injection 1 mg  1 mg IntraMUSCular PRN    dextrose 10% infusion 0-250 mL  0-250 mL IntraVENous PRN    dorzolamide-timoloL (COSOPT) 22.3-6.8 mg/mL ophthalmic solution 1 Drop  1 Drop Both Eyes BID    sodium chloride (NS) flush 5-10 mL  5-10 mL IntraVENous PRN    Vanocmycin - Rx to dose and monitor  1 Each Other Rx Dosing/Monitoring    heparin (porcine) injection 5,000 Units  5,000 Units SubCUTAneous Q8H    pantoprazole (PROTONIX) tablet 40 mg  40 mg Oral ACB       Recent Results (from the past 24 hour(s))   GLUCOSE, POC    Collection Time: 02/04/23 11:39 AM   Result Value Ref Range    Glucose (POC) 134 (H) 70 - 110 mg/dL   GLUCOSE, POC    Collection Time: 02/04/23  3:26 PM   Result Value Ref Range    Glucose (POC) 123 (H) 70 - 110 mg/dL   GLUCOSE, POC    Collection Time: 02/04/23  8:59 PM   Result Value Ref Range    Glucose (POC) 283 (H) 70 - 110 mg/dL   CBC W/O DIFF    Collection Time: 02/05/23  7:20 AM   Result Value Ref Range    WBC 16.8 (H) 4.6 - 13.2 K/uL    RBC 4.85 4.35 - 5.65 M/uL    HGB 13.4 13.0 - 16.0 g/dL    HCT 43.1 36.0 - 48.0 %    MCV 88.9 78.0 - 100.0 FL    MCH 27.6 24.0 - 34.0 PG    MCHC 31.1 31.0 - 37.0 g/dL    RDW 14.1 11.6 - 14.5 %    PLATELET 349 635 - 956 K/uL    MPV 10.6 9.2 - 11.8 FL    NRBC 0.0 0  WBC    ABSOLUTE NRBC 0.00 0.00 - 6.10 K/uL   METABOLIC PANEL, BASIC    Collection Time: 02/05/23  7:20 AM   Result Value Ref Range    Sodium 138 136 - 145 mmol/L    Potassium 4.7 3.5 - 5.5 mmol/L    Chloride 104 100 - 111 mmol/L    CO2 28 21 - 32 mmol/L    Anion gap 6 3.0 - 18 mmol/L    Glucose 207 (H) 74 - 99 mg/dL    BUN 21 (H) 7.0 - 18 MG/DL    Creatinine 1.29 0.6 - 1.3 MG/DL    BUN/Creatinine ratio 16 12 - 20      eGFR >60 >60 ml/min/1.73m2    Calcium 8.8 8.5 - 10.1 MG/DL   GLUCOSE, POC    Collection Time: 02/05/23 8:06 AM   Result Value Ref Range    Glucose (POC) 200 (H) 70 - 110 mg/dL       Procedures/imaging: see electronic medical records for all procedures/Xrays and details which were not copied into this note but were reviewed prior to creation of Mague Cramer MD   2/5/2023, 11:23 AM

## 2023-02-06 LAB
ALBUMIN SERPL-MCNC: 2.8 G/DL (ref 3.4–5)
ALBUMIN/GLOB SERPL: 0.7 (ref 0.8–1.7)
ALP SERPL-CCNC: 83 U/L (ref 45–117)
ALT SERPL-CCNC: 64 U/L (ref 16–61)
ANION GAP SERPL CALC-SCNC: 5 MMOL/L (ref 3–18)
AST SERPL-CCNC: 50 U/L (ref 10–38)
BASOPHILS # BLD: 0.1 K/UL (ref 0–0.1)
BASOPHILS NFR BLD: 1 % (ref 0–2)
BILIRUB SERPL-MCNC: 0.3 MG/DL (ref 0.2–1)
BUN SERPL-MCNC: 23 MG/DL (ref 7–18)
BUN/CREAT SERPL: 19 (ref 12–20)
CALCIUM SERPL-MCNC: 8.6 MG/DL (ref 8.5–10.1)
CHLORIDE SERPL-SCNC: 105 MMOL/L (ref 100–111)
CO2 SERPL-SCNC: 29 MMOL/L (ref 21–32)
CREAT SERPL-MCNC: 1.22 MG/DL (ref 0.6–1.3)
DIFFERENTIAL METHOD BLD: ABNORMAL
EOSINOPHIL # BLD: 0.2 K/UL (ref 0–0.4)
EOSINOPHIL NFR BLD: 2 % (ref 0–5)
ERYTHROCYTE [DISTWIDTH] IN BLOOD BY AUTOMATED COUNT: 13.9 % (ref 11.6–14.5)
GLOBULIN SER CALC-MCNC: 4 G/DL (ref 2–4)
GLUCOSE BLD STRIP.AUTO-MCNC: 129 MG/DL (ref 70–110)
GLUCOSE BLD STRIP.AUTO-MCNC: 137 MG/DL (ref 70–110)
GLUCOSE BLD STRIP.AUTO-MCNC: 142 MG/DL (ref 70–110)
GLUCOSE BLD STRIP.AUTO-MCNC: 168 MG/DL (ref 70–110)
GLUCOSE SERPL-MCNC: 125 MG/DL (ref 74–99)
HCT VFR BLD AUTO: 42.2 % (ref 36–48)
HGB BLD-MCNC: 13 G/DL (ref 13–16)
IMM GRANULOCYTES # BLD AUTO: 0.7 K/UL (ref 0–0.04)
IMM GRANULOCYTES NFR BLD AUTO: 6 % (ref 0–0.5)
LYMPHOCYTES # BLD: 1.4 K/UL (ref 0.9–3.6)
LYMPHOCYTES NFR BLD: 11 % (ref 21–52)
MCH RBC QN AUTO: 27 PG (ref 24–34)
MCHC RBC AUTO-ENTMCNC: 30.8 G/DL (ref 31–37)
MCV RBC AUTO: 87.6 FL (ref 78–100)
MONOCYTES # BLD: 1 K/UL (ref 0.05–1.2)
MONOCYTES NFR BLD: 8 % (ref 3–10)
NEUTS SEG # BLD: 9.4 K/UL (ref 1.8–8)
NEUTS SEG NFR BLD: 73 % (ref 40–73)
NRBC # BLD: 0 K/UL (ref 0–0.01)
NRBC BLD-RTO: 0 PER 100 WBC
PLATELET # BLD AUTO: 275 K/UL (ref 135–420)
PMV BLD AUTO: 10.7 FL (ref 9.2–11.8)
POTASSIUM SERPL-SCNC: 4.9 MMOL/L (ref 3.5–5.5)
PROT SERPL-MCNC: 6.8 G/DL (ref 6.4–8.2)
RBC # BLD AUTO: 4.82 M/UL (ref 4.35–5.65)
SODIUM SERPL-SCNC: 139 MMOL/L (ref 136–145)
VANCOMYCIN SERPL-MCNC: 20.6 UG/ML (ref 5–40)
WBC # BLD AUTO: 12.8 K/UL (ref 4.6–13.2)

## 2023-02-06 PROCEDURE — 36415 COLL VENOUS BLD VENIPUNCTURE: CPT

## 2023-02-06 PROCEDURE — 85025 COMPLETE CBC W/AUTO DIFF WBC: CPT

## 2023-02-06 PROCEDURE — 97165 OT EVAL LOW COMPLEX 30 MIN: CPT

## 2023-02-06 PROCEDURE — 74011250636 HC RX REV CODE- 250/636: Performed by: INTERNAL MEDICINE

## 2023-02-06 PROCEDURE — 74011636637 HC RX REV CODE- 636/637: Performed by: SURGERY

## 2023-02-06 PROCEDURE — 74011250637 HC RX REV CODE- 250/637: Performed by: SURGERY

## 2023-02-06 PROCEDURE — 65270000029 HC RM PRIVATE

## 2023-02-06 PROCEDURE — 74011250636 HC RX REV CODE- 250/636: Performed by: HOSPITALIST

## 2023-02-06 PROCEDURE — 74011250637 HC RX REV CODE- 250/637: Performed by: INTERNAL MEDICINE

## 2023-02-06 PROCEDURE — 74011250636 HC RX REV CODE- 250/636: Performed by: SURGERY

## 2023-02-06 PROCEDURE — 74011000258 HC RX REV CODE- 258: Performed by: SURGERY

## 2023-02-06 PROCEDURE — 80053 COMPREHEN METABOLIC PANEL: CPT

## 2023-02-06 PROCEDURE — 82962 GLUCOSE BLOOD TEST: CPT

## 2023-02-06 PROCEDURE — 74011000250 HC RX REV CODE- 250: Performed by: SURGERY

## 2023-02-06 PROCEDURE — 80202 ASSAY OF VANCOMYCIN: CPT

## 2023-02-06 RX ORDER — HYDRALAZINE HYDROCHLORIDE 20 MG/ML
10 INJECTION INTRAMUSCULAR; INTRAVENOUS
Status: DISCONTINUED | OUTPATIENT
Start: 2023-02-06 | End: 2023-02-07

## 2023-02-06 RX ORDER — VERAPAMIL HYDROCHLORIDE 120 MG/1
120 TABLET, FILM COATED ORAL 2 TIMES DAILY
Status: DISCONTINUED | OUTPATIENT
Start: 2023-02-06 | End: 2023-02-09 | Stop reason: HOSPADM

## 2023-02-06 RX ADMIN — HYDROMORPHONE HYDROCHLORIDE 1 MG: 1 INJECTION, SOLUTION INTRAMUSCULAR; INTRAVENOUS; SUBCUTANEOUS at 18:40

## 2023-02-06 RX ADMIN — DORZOLAMIDE HYDROCHLORIDE AND TIMOLOL MALEATE 1 DROP: 20; 5 SOLUTION/ DROPS OPHTHALMIC at 21:50

## 2023-02-06 RX ADMIN — PANTOPRAZOLE SODIUM 40 MG: 40 TABLET, DELAYED RELEASE ORAL at 07:43

## 2023-02-06 RX ADMIN — ACETAMINOPHEN 650 MG: 325 TABLET ORAL at 06:42

## 2023-02-06 RX ADMIN — HYDROMORPHONE HYDROCHLORIDE 1 MG: 1 INJECTION, SOLUTION INTRAMUSCULAR; INTRAVENOUS; SUBCUTANEOUS at 10:03

## 2023-02-06 RX ADMIN — CEFTRIAXONE 2 G: 2 INJECTION, POWDER, FOR SOLUTION INTRAMUSCULAR; INTRAVENOUS at 12:06

## 2023-02-06 RX ADMIN — HEPARIN SODIUM 5000 UNITS: 5000 INJECTION INTRAVENOUS; SUBCUTANEOUS at 20:54

## 2023-02-06 RX ADMIN — VANCOMYCIN HYDROCHLORIDE 1000 MG: 1 INJECTION, POWDER, LYOPHILIZED, FOR SOLUTION INTRAVENOUS at 06:25

## 2023-02-06 RX ADMIN — ROSUVASTATIN CALCIUM 40 MG: 10 TABLET, COATED ORAL at 21:50

## 2023-02-06 RX ADMIN — VERAPAMIL HYDROCHLORIDE 120 MG: 120 TABLET ORAL at 20:55

## 2023-02-06 RX ADMIN — VERAPAMIL HYDROCHLORIDE 120 MG: 120 TABLET ORAL at 09:59

## 2023-02-06 RX ADMIN — SODIUM CHLORIDE, PRESERVATIVE FREE 10 ML: 5 INJECTION INTRAVENOUS at 06:45

## 2023-02-06 RX ADMIN — DORZOLAMIDE HYDROCHLORIDE AND TIMOLOL MALEATE 1 DROP: 20; 5 SOLUTION/ DROPS OPHTHALMIC at 10:01

## 2023-02-06 RX ADMIN — ACETAMINOPHEN 650 MG: 325 TABLET ORAL at 17:22

## 2023-02-06 RX ADMIN — HEPARIN SODIUM 5000 UNITS: 5000 INJECTION INTRAVENOUS; SUBCUTANEOUS at 02:48

## 2023-02-06 RX ADMIN — HYDROMORPHONE HYDROCHLORIDE 1 MG: 1 INJECTION, SOLUTION INTRAMUSCULAR; INTRAVENOUS; SUBCUTANEOUS at 22:48

## 2023-02-06 RX ADMIN — Medication 6 UNITS: at 10:00

## 2023-02-06 RX ADMIN — Medication 2 UNITS: at 17:15

## 2023-02-06 RX ADMIN — HYDRALAZINE HYDROCHLORIDE 10 MG: 20 INJECTION INTRAMUSCULAR; INTRAVENOUS at 17:22

## 2023-02-06 RX ADMIN — HEPARIN SODIUM 5000 UNITS: 5000 INJECTION INTRAVENOUS; SUBCUTANEOUS at 12:02

## 2023-02-06 RX ADMIN — HYDROMORPHONE HYDROCHLORIDE 1 MG: 1 INJECTION, SOLUTION INTRAMUSCULAR; INTRAVENOUS; SUBCUTANEOUS at 14:45

## 2023-02-06 RX ADMIN — HYDROMORPHONE HYDROCHLORIDE 1 MG: 1 INJECTION, SOLUTION INTRAMUSCULAR; INTRAVENOUS; SUBCUTANEOUS at 02:47

## 2023-02-06 RX ADMIN — BUPROPION HYDROCHLORIDE 300 MG: 150 TABLET, EXTENDED RELEASE ORAL at 09:54

## 2023-02-06 NOTE — PROGRESS NOTES
Problem: Diabetes Self-Management  Goal: *Disease process and treatment process  Description: Define diabetes and identify own type of diabetes; list 3 options for treating diabetes. Outcome: Progressing Towards Goal     Problem: Falls - Risk of  Goal: *Absence of Falls  Description: Document Abdifatah Areli Fall Risk and appropriate interventions in the flowsheet.   Outcome: Progressing Towards Goal  Note: Fall Risk Interventions:  Mobility Interventions: Patient to call before getting OOB         Medication Interventions: Patient to call before getting OOB    Elimination Interventions: Call light in reach, Patient to call for help with toileting needs, Toileting schedule/hourly rounds    History of Falls Interventions: Bed/chair exit alarm         Problem: Patient Education: Go to Patient Education Activity  Goal: Patient/Family Education  Outcome: Progressing Towards Goal

## 2023-02-06 NOTE — PROGRESS NOTES
Bedside and Verbal shift change report given to Jana RN (oncoming nurse) by Flo Jaquez RN (offgoing nurse). Report included the following information SBAR, Kardex, MAR, Recent Results, and Med Rec Status.

## 2023-02-06 NOTE — PROGRESS NOTES
Right thigh dressing changed per order, pack incision site intact, pain med administered, pt tolerated well. 8800: Bedside shift change report given to Teri Browne (oncoming nurse) by Karmen Cabot, RN (offgoing nurse). Report included the following information SBAR, Procedure Summary, Intake/Output, MAR, and Recent Results.

## 2023-02-06 NOTE — PROGRESS NOTES
PICC line to be placed tomorrow morning due to physician availability.  Pt RN informed and patient is not being discharged today

## 2023-02-06 NOTE — WOUND CARE
IP WOUND CONSULT    Susie Rebollar  MEDICAL RECORD NUMBER:  [id number]  AGE: 46 y.o. GENDER: male  : 1970  TODAY'S DATE:  2023    GENERAL     [] Follow-up   [] New Consult    [] Present on Admission  [] Hospital Acquired    Leandro Kearney is a 46 y.o. male referred by:   [] Physician  [] Nursing  [] Other:         PAST MEDICAL HISTORY    Past Medical History:   Diagnosis Date    Arthritis     spine    Chronic pain     lumbar    GERD (gastroesophageal reflux disease)     Sleep apnea     CPAP in the past, was 300 lbs, lost weight        PAST SURGICAL HISTORY    Past Surgical History:   Procedure Laterality Date    HX ORTHOPAEDIC      right ankle surgery       FAMILY HISTORY    History reviewed. No pertinent family history. SOCIAL HISTORY    Social History     Tobacco Use    Smoking status: Never    Smokeless tobacco: Never   Substance Use Topics    Alcohol use: Yes     Alcohol/week: 5.0 standard drinks     Types: 5 Shots of liquor per week    Drug use: No       ALLERGIES    No Known Allergies    MEDICATIONS    No current facility-administered medications on file prior to encounter. Current Outpatient Medications on File Prior to Encounter   Medication Sig Dispense Refill    sildenafil citrate (VIAGRA) 100 mg tablet Take 100 mg by mouth two (2) times a day. metoprolol tartrate (LOPRESSOR) 100 mg IR tablet Take 100 mg by mouth two (2) times a day.      ezetimibe (ZETIA) 10 mg tablet Take 10 mg by mouth daily. rosuvastatin (CRESTOR) 40 mg tablet Take 40 mg by mouth nightly. latanoprost (XALATAN) 0.005 % ophthalmic solution Administer 1 Drop to both eyes nightly. metFORMIN ER (GLUCOPHAGE XR) 500 mg tablet Take 500 mg by mouth daily (with dinner). verapamiL (CALAN) 120 mg tablet Take 120 mg by mouth three (3) times daily. Only been taking 2x daily, took 1 tab this morning      buPROPion XL (WELLBUTRIN XL) 300 mg XL tablet Take 300 mg by mouth daily.       clindamycin (CLEOCIN) 300 mg capsule Take 300 mg by mouth three (3) times daily. Has 100 mg bottle will capsules at bedside as well to make a total of 400 mg      oxyCODONE-acetaminophen (PERCOCET 10)  mg per tablet Take 1 Tab by mouth every six (6) hours as needed for Pain. lisinopril (PRINIVIL, ZESTRIL) 40 mg tablet Take 40 mg by mouth daily. (Patient not taking: Reported on 2/1/2023)      amLODIPine (NORVASC) 5 mg tablet Take 5 mg by mouth daily. (Patient not taking: Reported on 2/1/2023)      phenylephrine (NEOSYNEPHRINE) 1 % spry 2 Sprays by Both Nostrils route every six (6) hours as needed. (Patient not taking: Reported on 2/1/2023)         Wt Readings from Last 3 Encounters:   02/05/23 (!) 161 kg (354 lb 15.1 oz)   12/08/16 114.8 kg (253 lb)   01/25/16 104.4 kg (230 lb 2 oz)       [email or web address] Vitals  BP (!) 178/98 (BP 1 Location: Left lower arm, BP Patient Position: Sitting)   Pulse 69   Temp 97.4 °F (36.3 °C)   Resp 16   Ht 5' 9\" (1.753 m)   Wt (!) 161 kg (354 lb 15.1 oz)   SpO2 98%   BMI 52.42 kg/m²       ASSESSMENT     Skin impairment Identification:  Type:  surgical    Contributing Factors: edema    Wound Thigh Right;Lateral 02/03/23 (Active)   Wound Image   02/06/23 1111   Wound Etiology Other (Comment) 02/06/23 1111   Dressing Status Dry; Intact 02/06/23 1111   Cleansed Cleansed with saline 02/06/23 1111   Dressing/Treatment Collagen;Silicone border 38/84/25 1111   Wound Length (cm) 1.5 cm 02/06/23 1111   Wound Width (cm) 0.4 cm 02/06/23 1111   Wound Depth (cm) 0.1 cm 02/06/23 1111   Wound Surface Area (cm^2) 0.6 cm^2 02/06/23 1111   Change in Wound Size % 82.46 02/06/23 1111   Wound Volume (cm^3) 0.06 cm^3 02/06/23 1111   Wound Healing % 91 02/06/23 1111   Wound Assessment Granulation tissue; Devitalized tissue 02/06/23 1111   Drainage Amount Scant 02/06/23 1111   Drainage Description Serosanguinous 02/06/23 1111   Wound Odor None 02/06/23 1111   Lyly-Wound/Incision Assessment Intact; Induration 02/06/23 1111   Edges Defined edges 02/06/23 1111   Wound Thickness Description Partial thickness 02/06/23 1111   Number of days: 3       Wound Thigh Anterior;Proximal;Right 02/03/23 (Active)   Wound Image   02/06/23 1111   Wound Etiology Other (Comment) 02/06/23 1111   Dressing Status Intact;Breakthrough drainage noted 02/06/23 1111   Cleansed Cleansed with saline 02/06/23 1111   Dressing/Treatment ABD pad;Tegaderm/Transparent film dressing 02/05/23 2005   Wound Length (cm) 2.3 cm 02/06/23 1111   Wound Width (cm) 2.3 cm 02/06/23 1111   Wound Depth (cm) 3 cm 02/06/23 1111   Wound Surface Area (cm^2) 5.29 cm^2 02/06/23 1111   Change in Wound Size % 85.31 02/06/23 1111   Wound Volume (cm^3) 15.87 cm^3 02/06/23 1111   Distance Tunneling (cm) 6 cm 02/06/23 1111   Direction of Tunnel 9 o'clock 02/06/23 1111   Wound Assessment Granulation tissue 02/06/23 1111   Drainage Amount Moderate 02/06/23 1111   Drainage Description Serosanguinous 02/06/23 1111   Wound Odor None 02/06/23 1111   Lyly-Wound/Incision Assessment Induration; Intact 02/06/23 1111   Edges Defined edges 02/06/23 1111   Wound Thickness Description Full thickness 02/06/23 1111   Number of days:        Incision 02/04/23 Thigh Right (Active)   Dressing Status Breakthrough drainage noted;New dressing applied 02/05/23 2005   Dressing/Treatment ABD pad;Tegaderm/Transparent film dressing 02/05/23 2005   Number of days: 2       [REMOVED] Read-Only, Retired.  ZZZ DO NOT USE OLD WOUND LDA Back Mid;Lower (Removed)   Number of days: 4585          PLAN     Skin Care & Pressure Relief Recommendations  Minimize layers of linen  Pads under patient to optimize support surface    Physician/Provider notified: Yes -   Recommendations: continue daily dressing changes per Dr. Steve Concepcion orders    Teaching completed with:   [x] Patient           [] Family member       [] Caregiver          [] Nursing  [] Other    Patient/Caregiver Teaching:  Level of patient/caregiver understanding able to:   [x] Indicates understanding       [] Needs reinforcement  [] Unsuccessful      [] Verbal Understanding  [] Demonstrated understanding       [] No evidence of learning  [] Refused teaching         [] N/A       Electronically signed by Maikol Murry RN on 2/6/2023 at 2:02 PM

## 2023-02-06 NOTE — PROGRESS NOTES
2/6/2023  PT Re-eval not completed due to:  [] Off Unit for testing/procedure  [] Pain  [] Eating  [] Patient too lethargic  [] Nausea/vomiting  [] Dialysis treatment in progress   [x] Other: Pt had several visitors present, Pt requested to give him some time. Will f/u at later time as pt schedule allows. Thank you. Linda Powers, SPT    2/6/2023 2nd attempt 14:39: Pt EOB leaning on tray table in pain, nurse Jana notified. Brought Pt cup of ice. Will follow-up later as pt schedule and pain levels allow. 2/6/2023 3rd attempt 16:35: Chart reviewed prior to attempting re-eval. Nurse Ariane Salgado paged Dr. Lalo Hdz for BP concerns (per chart). Will follow-up tomorrow as Pt schedule and medical status allows.

## 2023-02-06 NOTE — PROGRESS NOTES
[facility]  (A Division of [address] [place] [address])                                                           Date of Admission: 2/1/2023   Date of Note: 2/6/2023  Reason for Consult: cellulitis- right leg  Referring MD: Dr Hubert Vega    Current Antimicrobials:    Prior Antimicrobials:  Zosyn -> Ceftriaxone and Vancomycin 2/1 to date   Clindamycin PO PTA  Ceftriaxone 1 gm IV X1 --2/1/23   Allergy to antibiotics: None     Assessment:     Severe sepsis / leucocytosis / elevated lactic acid 2/2 below-- non sustained hypotension:   Right proximal thigh SSI/Cellulitis with deep multi-loculated abscess due to S.pyogenes  --FU US with 7.9cm X 7.9cm by 2.7 cm   S/P I and D of right thigh abscess 2/4/23  --Per op note: \". .abscess started 2 cm from skin, expressed about 100cc of pus, extended all the way to myofascial compartment but did not appear to go beyond it. Charles Bennett \"  --OR culture 2/4/23--GPC in pair on GS, no aerobic/anaerobic growth so far  --MRSA screen negative  WCX 2/1-- heavy Gp A strep, ligh CoNS  Leucocytosis: Declining 12.8K  VIKTOR-- acute  due to above: improved  Pre-diabetes-- On Metformin by history  SHAHRIAR  Morbidly obese- BMI 48  Pul Hypertension-- was on Viagra  History of back surgery- 2016    Recommendation -- ID related:     Appreciate Dr Maryelizabeth Closs help with I and D  OR culture-- Negative so far-- GPC on GS  DC vanco  Ceftriaxone 2 gm IV daily X2 weeks from 2/4/23-- End date: 2/17/23  PICC ordered--needed for OP infusion  Wound orders per surgical team for now, can be placed with me in wound clinic if needed on DC  DW wound RN, wound examined together      Subjective:     R groin area/ upper thigh hurts, no fevers  Weekend event reviewed-- he had I and D of deep abscess- Op note reviewed  Body aches is better  No N/V/Diarreha    Afebrile, wbc improving. Notes/Labs/Cultures and Imaging reports reviewed.     HPI:       Ke Penn is a 46 y.o. male with hypertension, GERD, sleep apnea ,Pul hypertension and Obese. He had a fall 4 weeks ago which happened when he was pushing heavy set equipment. Had small wound with pain over there that later became red and was given Clindamycin by his PCP- > a week ago. It got better initially, but later he noted anterior thigh -right- swelling and pain, had fever/chills and body aches daily for >4-5 days. He came in to ED on 2/2-- found to have leucocytosis, briefly hypotensive, no tachycardia or hypoxia. CT showed R thigh cellulitis with ill defined phlegmon -- no drain able abscess. No fracture of OM. C/O of headaches, right thigh pain, but no N/V/Diarrhea/SOB/ abdominal pain. Voiding urine without any issue. Currently on Vancomycin and zosyn. He is unemployed due to back pain. He used to work in construction    Active Hospital Problems    Diagnosis Date Noted    Abscess of right thigh 02/03/2023    Controlled type 2 diabetes mellitus, without long-term current use of insulin ([address] 02/02/2023    Lactic acidosis 02/02/2023    Cellulitis 02/01/2023    Hypotension 02/01/2023    Sepsis ([address] 02/01/2023    VIKTOR (acute kidney injury) ([address] 02/01/2023    Septic shock ([address] 02/01/2023    SHAHRIAR (obstructive sleep apnea) 02/01/2023    BMI 45.0-49.9, adult ([facility] 02/01/2023     Past Medical History:   Diagnosis Date    Arthritis     spine    Chronic pain     lumbar    GERD (gastroesophageal reflux disease)     Sleep apnea     CPAP in the past, was 300 lbs, lost weight     Past Surgical History:   Procedure Laterality Date    HX ORTHOPAEDIC  1990    right ankle surgery     History reviewed. No pertinent family history.   Social History     Socioeconomic History    Marital status:      Spouse name: Not on file    Number of children: Not on file    Years of education: Not on file    Highest education level: Not on file   Occupational History    Not on file   Tobacco Use    Smoking status: Never    Smokeless tobacco: Never   Substance and Sexual Activity    Alcohol use: Yes     Alcohol/week: 5.0 standard drinks     Types: 5 Shots of liquor per week    Drug use: No    Sexual activity: Yes   Other Topics Concern    Not on file   Social History Narrative    Not on file     Social Determinants of Health     Financial Resource Strain: Not on file   Food Insecurity: Not on file   Transportation Needs: Not on file   Physical Activity: Not on file   Stress: Not on file   Social Connections: Not on file   Intimate Partner Violence: Not on file   Housing Stability: Not on file       Medications:  Current Facility-Administered Medications   Medication Dose Route Frequency    verapamiL (CALAN) tablet 120 mg  120 mg Oral BID    buPROPion XL (WELLBUTRIN XL) tablet 300 mg  300 mg Oral DAILY    rosuvastatin (CRESTOR) tablet 40 mg  40 mg Oral QHS    oxyCODONE IR (ROXICODONE) tablet 5 mg  5 mg Oral Q4H PRN    HYDROmorphone (DILAUDID) injection 1 mg  1 mg IntraVENous Q4H PRN    insulin glargine (LANTUS) injection 6 Units  6 Units SubCUTAneous DAILY    cefTRIAXone (ROCEPHIN) 2 g in 0.9% sodium chloride (MBP/ADV) 50 mL MBP  2 g IntraVENous Q24H    acetaminophen (TYLENOL) tablet 650 mg  650 mg Oral Q6H PRN    insulin lispro (HUMALOG) injection   SubCUTAneous AC&HS    glucose chewable tablet 16 g  4 Tablet Oral PRN    glucagon (GLUCAGEN) injection 1 mg  1 mg IntraMUSCular PRN    dextrose 10% infusion 0-250 mL  0-250 mL IntraVENous PRN    dorzolamide-timoloL (COSOPT) 22.3-6.8 mg/mL ophthalmic solution 1 Drop  1 Drop Both Eyes BID    sodium chloride (NS) flush 5-10 mL  5-10 mL IntraVENous PRN    heparin (porcine) injection 5,000 Units  5,000 Units SubCUTAneous Q8H    pantoprazole (PROTONIX) tablet 40 mg  40 mg Oral ACB        Review of Systems     Negative Unless BOLDED     General: fevers, chills, myalgias, arthralgias, unexplained weight loss, malaise, fatigue.   HEENT:  headaches, recent URI  PUlMONARY:  cough , shortness of breath  Cardiovascular: chest pain         Objective: Visit Vitals  BP (!) 145/93 (BP 1 Location: Left lower arm, BP Patient Position: Sitting)   Pulse 69   Temp 97.6 °F (36.4 °C)   Resp 16   Ht 5' 9\" (1.753 m)   Wt (!) 161 kg (354 lb 15.1 oz)   SpO2 99%   BMI 52.42 kg/m²     Temp (24hrs), Av.5 °F (36.4 °C), Min:97.3 °F (36.3 °C), Max:97.8 °F (36.6 °C)      Lines: PIV- right     General:   WD Obese , on RA awake alert and oriented   Skin:   no diffuse rashes  Edematous right proximal thigh/groin area  I and D surgical wound with deep abscess- goes to 6cm  Still with some induarion   HEENT:  Normocephalic, atraumatic, EOMI, no scleral icterus or pallor; no conjunctival hemmohage;       Lungs:   non-labored       Abdomen:  soft, severely obese, active bowel sounds. Appropriate surgical scars for stated surgeries. Non-tender   Genitourinary:  deferred   Extremities:   no clubbing, cyanosis; no joint effusions or swelling; ; muscle mass appropriate for age   Neurologic:  No gross focal sensory abnormalities;  Cranial nerves intact   Psychiatric:   appropriate and interactive.         Labs: Results:   Chemistry Recent Labs     23  0513 23  0720 23  0237   * 207* 162*    138 137   K 4.9 4.7 3.4*    104 102   CO2 29 28 26   BUN 23* 21* 19*   CREA 1.22 1.29 1.61*   CA 8.6 8.8 8.5   AGAP 5 6 9   BUCR 19 16 12   AP 83  --   --    TP 6.8  --   --    ALB 2.8*  --   --    GLOB 4.0  --   --    AGRAT 0.7*  --   --       CBC w/Diff Recent Labs     23  0513 23  0720 23  0237   WBC 12.8 16.8* 17.0*   RBC 4.82 4.85 5.00   HGB 13.0 13.4 13.8   HCT 42.2 43.1 42.6    261 225   GRANS 73  --   --    LYMPH 11*  --   --    EOS 2  --   --             No results found for: SDES Lab Results   Component Value Date/Time    Culture result: NO GROWTH THUS FAR 2023 02:59 PM    Culture result: NO GROWTH THUS FAR 2023 02:59 PM    Culture result: MRSA NOT PRESENT 2023 02:08 PM    Culture result:  2023 02:08 PM Screening of patient nares for MRSA is for surveillance purposes and, if positive, to facilitate isolation considerations in high risk settings. It is not intended for automatic decolonization interventions per se as regimens are not sufficiently effective to warrant routine use. Culture result: (A) 02/01/2023 03:20 PM     HEAVY Streptococci, beta hemolytic group A Penicillin and ampicillin are drugs of choice for treatment of beta-hemolytic streptococcal infections. Susceptibility testing of penicillins and beta-lactams approved by the FDA for treatment of beta-hemolytic streptococcal infections need not be performed routinely, because nonsusceptible isolates are extremely rare. CLSI 2012      Culture result: FEW STAPHYLOCOCCUS SPECIES, COAGULASE NEGATIVE (A) 02/01/2023 03:20 PM        Results       Procedure Component Value Units Date/Time    CULTURE, ANAEROBIC [[id number]] Collected: 02/04/23 1459    Order Status: Completed Specimen: Surgical Specimen Updated: 02/06/23 0923     Special Requests: NO SPECIAL REQUESTS        Culture result: NO GROWTH THUS FAR       CULTURE, Lavinia Budge STAIN [[id number]] Collected: 02/04/23 1459    Order Status: Completed Specimen: Wound from Leg Updated: 02/06/23 0922     Special Requests: NO SPECIAL REQUESTS        GRAM STAIN       OCCASIONAL Gram positive cocci PAIRS            RARE WBCS SEEN        Culture result: NO GROWTH THUS FAR       CULTURE, MRSA [[id number]] Collected: 02/02/23 1408    Order Status: Completed Specimen: Nasal from Nares Updated: 02/03/23 1170     Special Requests: NO SPECIAL REQUESTS        Culture result: MRSA NOT PRESENT               Screening of patient nares for MRSA is for surveillance purposes and, if positive, to facilitate isolation considerations in high risk settings. It is not intended for automatic decolonization interventions per se as regimens are not sufficiently effective to warrant routine use.       CULTURE, BLOOD [[id number]] Collected: 02/02/23 0400    Order Status: Canceled Specimen: Blood     CULTURE, Qian Roldano STAIN [[id number]]  (Abnormal) Collected: 02/01/23 1520    Order Status: Completed Specimen: Wound from Abscess Updated: 02/03/23 1320     Special Requests: --        RIGHT  HIP       GRAM STAIN OCCASIONAL WBCS SEEN               2+ Gram positive cocci IN PAIRS           Culture result:       HEAVY Streptococci, beta hemolytic group A Penicillin and ampicillin are drugs of choice for treatment of beta-hemolytic streptococcal infections. Susceptibility testing of penicillins and beta-lactams approved by the FDA for treatment of beta-hemolytic streptococcal infections need not be performed routinely, because nonsusceptible isolates are extremely rare. CLSI 2012              FEW STAPHYLOCOCCUS SPECIES, COAGULASE NEGATIVE          CULTURE, BLOOD [[id number]] Collected: 02/01/23 1500    Order Status: Completed Specimen: Blood Updated: 02/06/23 0824     Special Requests: NO SPECIAL REQUESTS        Culture result: NO GROWTH 5 DAYS                 Imaging: All imaging reviewed from Admission to present as per radiology interpretation in [address] [facility]  Virgil Gregorio MD  [place])  Office #:     [phone]   Office Fax: [phone]

## 2023-02-06 NOTE — ROUTINE PROCESS
Bedside and Verbal shift change report given to Parris Ann RN (oncoming nurse) by AYESHA Thompson RN (offgoing nurse). Report included the following information SBAR, Kardex, OR Summary, Intake/Output, MAR, and Recent Results.

## 2023-02-06 NOTE — ROUTINE PROCESS
Bedside shift change report given to MICHAEL PEDROZA (oncoming nurse) by Jamari Purvis RN   (offgoing nurse). Report included the following information SBAR, Kardex, Intake/Output, and Recent Results.

## 2023-02-06 NOTE — PROGRESS NOTES
Hospitalist Progress Note    Patient: Ozzy Monique MRN: [id number]  CSN: [id number]    YOB: 1970  Age: 46 y.o. Sex: male    DOA: 2/1/2023 LOS:  LOS: 5 days                Assessment/Plan     Patient Active Problem List   Diagnosis Code    Lumbar stenosis M48.061    Cellulitis L03.90    Hypotension I95.9    Sepsis ([facility] A41.9    VIKTOR (acute kidney injury) ([facility] N17.9    Septic shock (HCC) A41.9, R65.21    SHAHRIAR (obstructive sleep apnea) G47.33    BMI 45.0-49.9, adult ([facility] Z68.42    Controlled type 2 diabetes mellitus, without long-term current use of insulin (HCC) E11.9    Lactic acidosis E87.20    Abscess of right thigh L02.415        Chief complaint :  Sepsis, cellulitis. 46 y.o. male with hypertension, GERD, sleep apnea presents to ER with concerns of right thigh wound, redness, swelling. Abscess/Cellulitis - right thigh  S/p I&D by general surgery  ID following  OR culture pending  On ceftriaxone. ID recommend 2 weeks course. Septic shock - I  resolved  Received albumin,   Off levophed    HTN -  On home meds  Monitor BP  Hydralazine as needed. Heme/onc - Follow H&H, plts. VIKTOR -   resolved  renal US with no obstruction  Avoid nephrotoxins  Seen by Nephrology    DM -  On lantus, SSI. BMI of 52     Prophylaxis - DVT: heparin, GI: protonix. Estimated length of stay : 1-2 days. Review of systems  General: No fevers or chills. Cardiovascular: No chest pain or pressure. No palpitations. Pulmonary: No shortness of breath. Gastrointestinal: No nausea, vomiting. Physical Exam:  General: Awake, cooperative, no acute distress    HEENT: NC, Atraumatic. PERRLA, anicteric sclerae. Lungs: CTA Bilaterally. No Wheezing/Rhonchi/Rales. Heart:  S1 S2,  No murmur, No Rubs, No Gallops  Abdomen: Soft, Non distended, Non tender. +Bowel sounds,   Extremities: Right thigh around groin and right hip are with redness, swelling and induration.   Dressing in place  Psych:   Not anxious or agitated. Neurologic:  No acute neurological deficit. Vital signs/Intake and Output:  Visit Vitals  BP (!) 170/101 (BP Patient Position: Sitting)   Pulse 71   Temp 97.4 °F (36.3 °C)   Resp 16   Ht 5' 9\" (1.753 m)   Wt (!) 161 kg (354 lb 15.1 oz)   SpO2 96%   BMI 52.42 kg/m²     Current Shift:  02/06 0701 - 02/06 1900  In: 360 [P.O.:360]  Out: -   Last three shifts:  02/04 1901 - 02/06 0700  In: 1120 [P.O.:1120]  Out: 1625 [Urine:1625]            Labs: Results:       Chemistry Recent Labs     02/06/23 0513 02/05/23  0720 02/04/23 0237   * 207* 162*    138 137   K 4.9 4.7 3.4*    104 102   CO2 29 28 26   BUN 23* 21* 19*   CREA 1.22 1.29 1.61*   CA 8.6 8.8 8.5   AGAP 5 6 9   BUCR 19 16 12   AP 83  --   --    TP 6.8  --   --    ALB 2.8*  --   --    GLOB 4.0  --   --    AGRAT 0.7*  --   --       CBC w/Diff Recent Labs     02/06/23 0513 02/05/23  0720 02/04/23 0237   WBC 12.8 16.8* 17.0*   RBC 4.82 4.85 5.00   HGB 13.0 13.4 13.8   HCT 42.2 43.1 42.6    261 225   GRANS 73  --   --    LYMPH 11*  --   --    EOS 2  --   --       Cardiac Enzymes No results for input(s): CPK, CKND1, ADELINA in the last 72 hours. No lab exists for component: CKRMB, TROIP   Coagulation No results for input(s): PTP, INR, APTT, INREXT, INREXT in the last 72 hours. Lipid Panel No results found for: CHOL, CHOLPOCT, CHOLX, CHLST, CHOLV, 521856, HDL, HDLP, LDL, LDLC, DLDLP, 162932, VLDLC, VLDL, TGLX, TRIGL, TRIGP, TGLPOCT, CHHD, CHHDX   BNP No results for input(s): BNPP in the last 72 hours.    Liver Enzymes Recent Labs     02/06/23  0513   TP 6.8   ALB 2.8*   AP 83      Thyroid Studies No results found for: T4, T3U, TSH, TSHEXT, TSHEXT     Procedures/imaging: see electronic medical records for all procedures/Xrays and details which were not copied into this note but were reviewed prior to creation of Plan

## 2023-02-06 NOTE — PROGRESS NOTES
Problem: Diabetes Self-Management  Goal: *Monitoring blood glucose, interpreting and using results  Description: Identify recommended blood glucose targets  and personal targets. Outcome: Progressing Towards Goal     Problem: Falls - Risk of  Goal: *Absence of Falls  Description: Document Manojtru Kobe Fall Risk and appropriate interventions in the flowsheet.   Outcome: Progressing Towards Goal  Note: Fall Risk Interventions:  Mobility Interventions: Patient to call before getting OOB         Medication Interventions: Patient to call before getting OOB, Teach patient to arise slowly    Elimination Interventions: Call light in reach, Bed/chair exit alarm, Urinal in reach    History of Falls Interventions: Door open when patient unattended, Bed/chair exit alarm

## 2023-02-06 NOTE — PROGRESS NOTES
OCCUPATIONAL THERAPY EVALUATION/DISCHARGE    Problem: Self Care Deficits Care Plan (Adult)  Goal: *Acute Goals and Plan of Care (Insert Text)  Description: Occupational Therapy Goals  Initiated 2/3/2023 within 7 day(s). 1.  Patient will perform grooming with supervision/set-up standing at sink for 5 minutes or more. 2.  Patient will perform upper body dressing with independence. 3.  Patient will perform lower body dressing with minimal assistance/contact guard assist.  4.  Patient will perform toilet transfers with minimal assistance/contact guard assist.  5.  Patient will perform all aspects of toileting with minimal assistance/contact guard assist.  6.  Patient will participate in upper extremity therapeutic exercise/activities with supervision/set-up for 10 minutes. 7.  Patient will utilize energy conservation techniques during functional activities with verbal, visual, and tactile cues. Outcome: Progressing Towards Goal     Patient: Leandro Kearney (1 y.o. male)  Date: 2/6/2023  Primary Diagnosis: Sepsis ([facility] [address] [A41.9]  Cellulitis [L03.90]  Hypotension [I95.9]  VIKTOR (acute kidney injury) ([facility] [address] [N17.9]  Procedure(s) (LRB):  INCISION AND DRAINAGE RIGHT THIGH (Right) 2 Days Post-Op   Precautions:   Fall  PLOF: Pt was fully independent with self care tasks , no a/e use, does have w/c and walker avaliable     ASSESSMENT AND RECOMMENDATIONS:  Based on the objective data described below, the patient presents with generalized decreased functional strength and endurance following I&D to right hip. Pt reports he is much better than before surgery, decreased pain though at a 7/10 now. Pt demonstrates bed mobility with SBA, sit to stand with SBA. Pt is able to bend forward and touch the ground with good balance, for safety pt recommend to sit when trying to get objects off floor.  No difficulty donning clothes on LLE, decreased active motion of the right side, educated on propping foot up on stool to reach leg which pt is able to perform. Pt able to ambulate to bathroom without a/e with no LOB, able to perform toileting and toileting transfer with SBA. Pt returns to bed to get medication from nurse. No further skilled OT needs at this time however pt would benefit from shower chair to get in and out of tub as well as home health wound care to assist with packing wound. Skilled occupational therapy is not indicated at this time. Discharge Recommendations: Home Health wound care   Further Equipment Recommendations for Discharge: Shower chair     AMPAC: 23    This AMPAC score should be considered in conjunction with interdisciplinary team recommendations to determine the most appropriate discharge setting. Patient's social support, diagnosis, medical stability, and prior level of function should also be taken into consideration. SUBJECTIVE:   Patient stated I am worried about packing my wound at home.     OBJECTIVE DATA SUMMARY:     Past Medical History:   Diagnosis Date    Arthritis     spine    Chronic pain     lumbar    GERD (gastroesophageal reflux disease)     Sleep apnea     CPAP in the past, was 300 lbs, lost weight     Past Surgical History:   Procedure Laterality Date    HX ORTHOPAEDIC  1990    right ankle surgery     Barriers to Learning/Limitations: None  Compensate with: visual, verbal, tactile, kinesthetic cues/model    Home Situation:   Home Situation  Home Environment: Private residence  # Steps to Enter: 5  Rails to Enter: Yes  Hand Rails : Bilateral  Wheelchair Ramp: No  One/Two Story Residence: Two story, live on 1st floor  Living Alone: No  Support Systems: Spouse/Significant Other  Patient Expects to be Discharged to[de-identified] Home  Current DME Used/Available at Home: Walker, rolling, Wheelchair  Tub or Shower Type: Tub/Shower combination  [x]     Right hand dominant   []     Left hand dominant    Cognitive/Behavioral Status:  Neurologic State: Alert; Appropriate for age  Orientation Level: Oriented X4  Cognition: Appropriate decision making  Safety/Judgement: Fall prevention    Skin: dressing intact  Edema: noted through LE     Vision/Perceptual:    Tracking: Able to track stimulus in all quadrants w/o difficulty                                Coordination: BUE  Coordination: Within functional limits  Fine Motor Skills-Upper: Left Intact; Right Intact    Gross Motor Skills-Upper: Left Intact; Right Intact    Balance:  Sitting: Intact  Standing: Intact  Standing - Static: Good  Standing - Dynamic : Good    Strength: BUE    Strength: Generally decreased, functional              Tone & Sensation: BUE    Tone: Normal  Sensation: Intact                    Range of Motion: BUE    AROM: Within functional limits                         Functional Mobility and Transfers for ADLs:  Bed Mobility:     Supine to Sit: Stand-by assistance  Sit to Supine: Stand-by assistance     Transfers:  Sit to Stand: Stand-by assistance  Stand to Sit: Supervision                Toilet Transfer : Supervision           Bathroom Mobility: Supervision/set up    ADL Assessment:  Feeding: Independent    Oral Facial Hygiene/Grooming: Contact guard assistance         Upper Body Dressing: Contact guard assistance    Lower Body Dressing: Minimum assistance    Toileting: Contact guard assistance                ADL Intervention:                           Lower Body Dressing Assistance  Dressing Assistance: Minimum assistance  Socks: Minimum assistance         Cognitive Retraining  Problem Solving: General alternative solution  Executive Functions: Managing time;Regulating behavior  Safety/Judgement: Fall prevention    Pain:  Pain level pre-treatment: 7/10   Pain level post-treatment: 7/10   Pain Intervention(s): Medication (see MAR);  Rest, Ice, Repositioning   Response to intervention: Nurse notified, See doc flow    Activity Tolerance:   Good, able to complete tasks without fatgiue or LOB   Please refer to the flowsheet for vital signs taken during this treatment. After treatment:   []  Patient left in no apparent distress sitting up in chair  [x]  Patient left in no apparent distress in bed  [x]  Call bell left within reach  [x]  Nursing notified  []  Caregiver present  []  Bed alarm activated    COMMUNICATION/EDUCATION:   [x]      Role of Occupational Therapy in the acute care setting  [x]      Home safety education was provided and the patient/caregiver indicated understanding. [x]      Patient/family have participated as able and agree with findings and recommendations. []      Patient is unable to participate in plan of care at this time. Thank you for this referral.  Jj Mendoza OTD, OTR/L, CHT   Time Calculation: 15 mins      Eval Complexity: History: LOW Complexity : Brief history review ; Examination: LOW Complexity : 1-3 performance deficits relating to physical, cognitive , or psychosocial skils that result in activity limitations and / or participation restrictions ; Decision Making:LOW Complexity : No comorbidities that affect functional and no verbal or physical assistance needed to complete eval tasks     Guzman López -PAC® Daily Activity Inpatient Short Form (6-Clicks)*    How much HELP from another person does the patient currently need    (If the patient hasn't done an activity recently, how much help from another person do you think he/she would need if he/she tried?)   Total (Total A or Dep)   A Lot  (Mod to Max A)   A Little (Sup or Min A)   None (Mod I to I)   Putting on and taking off regular lower body clothing? [] 1 [] 2 [x] 3 [] 4   2. Bathing (including washing, rinsing,      drying)? [] 1 [] 2 [] 3 [x] 4   3. Toileting, which includes using toilet, bedpan or urinal?   [] 1 [] 2 [] 3 [x] 4   4. Putting on and taking off regular upper body clothing? [] 1 [] 2 [] 3 [x] 4   5. Taking care of personal grooming such as brushing teeth? [] 1 [] 2 [] 3 [x] 4   6. Eating meals?    [] 1 [] 2 [] 3 [x] 4     Based on an AM-PAC score of **/24 and their current ADL deficits; it is recommended that the patient have 5-7 sessions per week of Occupational Therapy at d/c to increase the patient's independence. Currently, this patient demonstrates the potential endurance, and/or tolerance for 3 hours of therapy each day at d/c. Based on an AM-PAC score of **/24 and their current ADL deficits; it is recommended that the patient have 3-5 sessions per week of Occupational Therapy at d/c to increase the patient's independence. Based on an AM-PAC score of **/24 and their current ADL deficits; it is recommended that the patient have 2-3 sessions per week of Occupational Therapy at d/c to increase the patient's independence. At this time and based on an AM-PAC score of **/24, no further OT is recommended upon discharge due to (i.e. patient at baseline functional statusetc). Recommend patient returns to prior setting with prior services.

## 2023-02-07 ENCOUNTER — APPOINTMENT (OUTPATIENT)
Dept: INTERVENTIONAL RADIOLOGY/VASCULAR | Age: 53
DRG: 871 | End: 2023-02-07
Attending: INTERNAL MEDICINE
Payer: COMMERCIAL

## 2023-02-07 LAB
BACTERIA SPEC CULT: NORMAL
GLUCOSE BLD STRIP.AUTO-MCNC: 124 MG/DL (ref 70–110)
GLUCOSE BLD STRIP.AUTO-MCNC: 127 MG/DL (ref 70–110)
GLUCOSE BLD STRIP.AUTO-MCNC: 150 MG/DL (ref 70–110)
GLUCOSE BLD STRIP.AUTO-MCNC: 154 MG/DL (ref 70–110)
SERVICE CMNT-IMP: NORMAL

## 2023-02-07 PROCEDURE — 74011250637 HC RX REV CODE- 250/637: Performed by: SURGERY

## 2023-02-07 PROCEDURE — 74011000258 HC RX REV CODE- 258: Performed by: SURGERY

## 2023-02-07 PROCEDURE — 74011250636 HC RX REV CODE- 250/636: Performed by: INTERNAL MEDICINE

## 2023-02-07 PROCEDURE — 74011250636 HC RX REV CODE- 250/636: Performed by: SURGERY

## 2023-02-07 PROCEDURE — 02HV33Z INSERTION OF INFUSION DEVICE INTO SUPERIOR VENA CAVA, PERCUTANEOUS APPROACH: ICD-10-PCS | Performed by: RADIOLOGY

## 2023-02-07 PROCEDURE — 74011250637 HC RX REV CODE- 250/637: Performed by: HOSPITALIST

## 2023-02-07 PROCEDURE — 74011250637 HC RX REV CODE- 250/637: Performed by: INTERNAL MEDICINE

## 2023-02-07 PROCEDURE — 74011250636 HC RX REV CODE- 250/636: Performed by: RADIOLOGY

## 2023-02-07 PROCEDURE — 74011636637 HC RX REV CODE- 636/637: Performed by: SURGERY

## 2023-02-07 PROCEDURE — 77030013687 IR PICC INSERT WO PORT OVER 5 YEARS WO IMG

## 2023-02-07 PROCEDURE — 65270000029 HC RM PRIVATE

## 2023-02-07 PROCEDURE — 82962 GLUCOSE BLOOD TEST: CPT

## 2023-02-07 PROCEDURE — 74011000250 HC RX REV CODE- 250: Performed by: RADIOLOGY

## 2023-02-07 PROCEDURE — 74011250636 HC RX REV CODE- 250/636: Performed by: HOSPITALIST

## 2023-02-07 RX ORDER — HEPARIN SODIUM 200 [USP'U]/100ML
500 INJECTION, SOLUTION INTRAVENOUS
Status: COMPLETED | OUTPATIENT
Start: 2023-02-07 | End: 2023-02-08

## 2023-02-07 RX ORDER — HYDRALAZINE HYDROCHLORIDE 20 MG/ML
20 INJECTION INTRAMUSCULAR; INTRAVENOUS
Status: DISCONTINUED | OUTPATIENT
Start: 2023-02-07 | End: 2023-02-09 | Stop reason: HOSPADM

## 2023-02-07 RX ORDER — LIDOCAINE HYDROCHLORIDE 10 MG/ML
1-20 INJECTION INFILTRATION; PERINEURAL
Status: COMPLETED | OUTPATIENT
Start: 2023-02-07 | End: 2023-02-07

## 2023-02-07 RX ORDER — SILDENAFIL CITRATE 20 MG/1
100 TABLET ORAL DAILY
Status: DISCONTINUED | OUTPATIENT
Start: 2023-02-07 | End: 2023-02-08

## 2023-02-07 RX ORDER — LISINOPRIL 20 MG/1
40 TABLET ORAL DAILY
Status: DISCONTINUED | OUTPATIENT
Start: 2023-02-07 | End: 2023-02-09 | Stop reason: HOSPADM

## 2023-02-07 RX ORDER — HEPARIN SODIUM (PORCINE) LOCK FLUSH IV SOLN 100 UNIT/ML 100 UNIT/ML
500 SOLUTION INTRAVENOUS
Status: DISCONTINUED | OUTPATIENT
Start: 2023-02-07 | End: 2023-02-09 | Stop reason: HOSPADM

## 2023-02-07 RX ADMIN — HEPARIN SODIUM (PORCINE) LOCK FLUSH IV SOLN 100 UNIT/ML 500 UNITS: 100 SOLUTION at 13:23

## 2023-02-07 RX ADMIN — HYDRALAZINE HYDROCHLORIDE 10 MG: 20 INJECTION INTRAMUSCULAR; INTRAVENOUS at 09:57

## 2023-02-07 RX ADMIN — HYDROMORPHONE HYDROCHLORIDE 1 MG: 1 INJECTION, SOLUTION INTRAMUSCULAR; INTRAVENOUS; SUBCUTANEOUS at 20:51

## 2023-02-07 RX ADMIN — HEPARIN SODIUM 5000 UNITS: 5000 INJECTION INTRAVENOUS; SUBCUTANEOUS at 20:50

## 2023-02-07 RX ADMIN — LIDOCAINE HYDROCHLORIDE 3 ML: 10 INJECTION, SOLUTION INFILTRATION; PERINEURAL at 13:23

## 2023-02-07 RX ADMIN — CEFTRIAXONE 2 G: 2 INJECTION, POWDER, FOR SOLUTION INTRAMUSCULAR; INTRAVENOUS at 15:15

## 2023-02-07 RX ADMIN — HYDRALAZINE HYDROCHLORIDE 10 MG: 20 INJECTION INTRAMUSCULAR; INTRAVENOUS at 03:25

## 2023-02-07 RX ADMIN — VERAPAMIL HYDROCHLORIDE 120 MG: 120 TABLET ORAL at 20:50

## 2023-02-07 RX ADMIN — ACETAMINOPHEN 650 MG: 325 TABLET ORAL at 05:39

## 2023-02-07 RX ADMIN — HYDROMORPHONE HYDROCHLORIDE 1 MG: 1 INJECTION, SOLUTION INTRAMUSCULAR; INTRAVENOUS; SUBCUTANEOUS at 03:27

## 2023-02-07 RX ADMIN — ROSUVASTATIN CALCIUM 40 MG: 10 TABLET, COATED ORAL at 20:50

## 2023-02-07 RX ADMIN — VERAPAMIL HYDROCHLORIDE 120 MG: 120 TABLET ORAL at 09:58

## 2023-02-07 RX ADMIN — HEPARIN SODIUM 5000 UNITS: 5000 INJECTION INTRAVENOUS; SUBCUTANEOUS at 05:39

## 2023-02-07 RX ADMIN — LISINOPRIL 40 MG: 20 TABLET ORAL at 12:47

## 2023-02-07 RX ADMIN — PANTOPRAZOLE SODIUM 40 MG: 40 TABLET, DELAYED RELEASE ORAL at 07:45

## 2023-02-07 RX ADMIN — DORZOLAMIDE HYDROCHLORIDE AND TIMOLOL MALEATE 1 DROP: 20; 5 SOLUTION/ DROPS OPHTHALMIC at 20:51

## 2023-02-07 RX ADMIN — HYDROMORPHONE HYDROCHLORIDE 1 MG: 1 INJECTION, SOLUTION INTRAMUSCULAR; INTRAVENOUS; SUBCUTANEOUS at 15:22

## 2023-02-07 RX ADMIN — Medication 6 UNITS: at 09:58

## 2023-02-07 RX ADMIN — HYDRALAZINE HYDROCHLORIDE 20 MG: 20 INJECTION INTRAMUSCULAR; INTRAVENOUS at 18:58

## 2023-02-07 RX ADMIN — DORZOLAMIDE HYDROCHLORIDE AND TIMOLOL MALEATE 1 DROP: 20; 5 SOLUTION/ DROPS OPHTHALMIC at 09:59

## 2023-02-07 RX ADMIN — OXYCODONE HYDROCHLORIDE 5 MG: 5 TABLET ORAL at 10:10

## 2023-02-07 RX ADMIN — BUPROPION HYDROCHLORIDE 300 MG: 150 TABLET, EXTENDED RELEASE ORAL at 09:58

## 2023-02-07 RX ADMIN — HEPARIN SODIUM IN SODIUM CHLORIDE 1000 UNITS: 200 INJECTION INTRAVENOUS at 13:24

## 2023-02-07 NOTE — ROUTINE PROCESS
Bedside and Verbal shift change report given to TED Hendricks RN (oncoming nurse) by Juan Fitzgerald RN (offgoing nurse). Report included the following information SBAR and Kardex.

## 2023-02-07 NOTE — BRIEF OP NOTE
INTERVENTIONAL RADIOLOGY POST PICC LINE NOTE       February 7, 2023       1:50 PM     Preoperative Diagnosis:   IV Access    Postoperative Diagnosis:  Same. :      Assistant:  None. Attending Physician: DR Aga Gonzalez    Type of Anesthesia:  1% Lidocaine local    Procedure/Description: left basilic  upper extremity PICC Line. Findings:  left basilic 5 Hebrew catheter placed. Tip at SVC/RA junction. OK to use. Length 46 cm. Estimated blood Loss: Minimal    Specimen Removed: None    Drains: None     Complications:  None. Condition:  Stable.     Discharge Plan:  discharge home

## 2023-02-07 NOTE — PROGRESS NOTES
Hospitalist Progress Note    Patient: Aileen Fields MRN: [id number]  CSN: [id number]    YOB: 1970  Age: 46 y.o. Sex: male    DOA: 2/1/2023 LOS:  LOS: 6 days                Assessment/Plan     Patient Active Problem List   Diagnosis Code    Lumbar stenosis M48.061    Cellulitis L03.90    Hypotension I95.9    Sepsis ([facility] [address] A41.9    VIKTOR (acute kidney injury) ([facility] [address] N17.9    Septic shock (HCC) A41.9, R65.21    SHAHRIAR (obstructive sleep apnea) G47.33    BMI 45.0-49.9, adult ([facility] [address] Z68.42    Controlled type 2 diabetes mellitus, without long-term current use of insulin (HCC) E11.9    Lactic acidosis E87.20    Abscess of right thigh L02.415        Chief complaint :  Sepsis, cellulitis. 46 y.o. male with hypertension, GERD, sleep apnea presents to ER with concerns of right thigh wound, redness, swelling. Abscess/Cellulitis - right thigh  S/p I&D by general surgery  ID following  OR culture pending  On ceftriaxone. ID recommend 2 weeks course. PICC placed    Septic shock -   resolved  Received albumin,   Off levophed    HTN -  uncontrolled  On home meds  Lisinopril added  Monitor BP  Hydralazine as needed. Pulmonary HTN -  Continue on sildenafil      Heme/onc - Follow H&H, plts. IVKTOR -   resolved  renal US with no obstruction  Avoid nephrotoxins  Seen by Nephrology    DM -  On lantus, SSI. BMI of 52     Prophylaxis - DVT: heparin, GI: protonix. Estimated length of stay : 1-2 days. Review of systems  General: No fevers or chills. Cardiovascular: No chest pain or pressure. No palpitations. Pulmonary: No shortness of breath. Gastrointestinal: No nausea, vomiting. Physical Exam:  General: Awake, cooperative, no acute distress    HEENT: NC, Atraumatic. PERRLA, anicteric sclerae. Lungs: CTA Bilaterally. No Wheezing/Rhonchi/Rales. Heart:  S1 S2,  No murmur, No Rubs, No Gallops  Abdomen: Soft, Non distended, Non tender.   +Bowel sounds,   Extremities: Right thigh around groin and right hip are with redness, swelling and induration. Dressing in place  Psych:   Not anxious or agitated. Neurologic:  No acute neurological deficit. Vital signs/Intake and Output:  Visit Vitals  BP (!) 170/101 (BP 1 Location: Right lower arm, BP Patient Position: Sitting)   Pulse 79   Temp 97.2 °F (36.2 °C)   Resp 22   Ht 5' 9\" (1.753 m)   Wt 157.8 kg (347 lb 14.2 oz)   SpO2 98%   BMI 51.37 kg/m²     Current Shift:  02/07 0701 - 02/07 1900  In: -   Out: 700 [Urine:700]  Last three shifts:  02/05 1901 - 02/07 0700  In: 840 [P.O.:840]  Out: 1700 [Urine:1700]            Labs: Results:       Chemistry Recent Labs     02/06/23 0513 02/05/23 0720   * 207*    138   K 4.9 4.7    104   CO2 29 28   BUN 23* 21*   CREA 1.22 1.29   CA 8.6 8.8   AGAP 5 6   BUCR 19 16   AP 83  --    TP 6.8  --    ALB 2.8*  --    GLOB 4.0  --    AGRAT 0.7*  --       CBC w/Diff Recent Labs     02/06/23 0513 02/05/23 0720   WBC 12.8 16.8*   RBC 4.82 4.85   HGB 13.0 13.4   HCT 42.2 43.1    261   GRANS 73  --    LYMPH 11*  --    EOS 2  --       Cardiac Enzymes No results for input(s): CPK, CKND1, ADELINA in the last 72 hours. No lab exists for component: CKRMB, TROIP   Coagulation No results for input(s): PTP, INR, APTT, INREXT, INREXT in the last 72 hours. Lipid Panel No results found for: CHOL, CHOLPOCT, CHOLX, CHLST, CHOLV, 389397, HDL, HDLP, LDL, LDLC, DLDLP, 874965, VLDLC, VLDL, TGLX, TRIGL, TRIGP, TGLPOCT, CHHD, CHHDX   BNP No results for input(s): BNPP in the last 72 hours.    Liver Enzymes Recent Labs     02/06/23 0513   TP 6.8   ALB 2.8*   AP 83      Thyroid Studies No results found for: T4, T3U, TSH, TSHEXT, TSHEXT     Procedures/imaging: see electronic medical records for all procedures/Xrays and details which were not copied into this note but were reviewed prior to creation of Plan

## 2023-02-07 NOTE — PROGRESS NOTES
2/7/2023  PT Re-eval not completed due to:  [] Off Unit for testing/procedure  [] Pain  [] Eating  [] Patient too lethargic  [] Nausea/vomiting  [] Dialysis treatment in progress   [x] Other: Pts BP was 205/108 seated this morning (per chart). Will hold until BP stabilizes. Thank you. Kecia Salmeron, SPT    2nd attempt 15:05: Pt eating and stated \"I can't walk right now my head hurts\". Nurse and mother currently  in room  with him. Will follow-up later  as Pt schedule allows. Thank you   Kecia Salmeron, SPT    3rd attempt 16:37: Pt seated EOB. Pt states that he does not feel comfortable standing up or walking at this time due to headache and high blood pressure. /95. Will follow-up tomorrow as Pt schedule allows.   Kecia Salmeron, SPT

## 2023-02-08 PROBLEM — I10 HTN (HYPERTENSION): Status: ACTIVE | Noted: 2023-02-08

## 2023-02-08 PROBLEM — R65.21 SEPTIC SHOCK (HCC): Status: RESOLVED | Noted: 2023-02-01 | Resolved: 2023-02-08

## 2023-02-08 PROBLEM — A41.9 SEPTIC SHOCK (HCC): Status: RESOLVED | Noted: 2023-02-01 | Resolved: 2023-02-08

## 2023-02-08 LAB
ANION GAP SERPL CALC-SCNC: 8 MMOL/L (ref 3–18)
BUN SERPL-MCNC: 20 MG/DL (ref 7–18)
BUN/CREAT SERPL: 18 (ref 12–20)
CALCIUM SERPL-MCNC: 8.9 MG/DL (ref 8.5–10.1)
CHLORIDE SERPL-SCNC: 104 MMOL/L (ref 100–111)
CO2 SERPL-SCNC: 26 MMOL/L (ref 21–32)
CREAT SERPL-MCNC: 1.09 MG/DL (ref 0.6–1.3)
GLUCOSE BLD STRIP.AUTO-MCNC: 127 MG/DL (ref 70–110)
GLUCOSE BLD STRIP.AUTO-MCNC: 134 MG/DL (ref 70–110)
GLUCOSE BLD STRIP.AUTO-MCNC: 150 MG/DL (ref 70–110)
GLUCOSE BLD STRIP.AUTO-MCNC: 157 MG/DL (ref 70–110)
GLUCOSE SERPL-MCNC: 175 MG/DL (ref 74–99)
POTASSIUM SERPL-SCNC: 4.2 MMOL/L (ref 3.5–5.5)
SODIUM SERPL-SCNC: 138 MMOL/L (ref 136–145)

## 2023-02-08 PROCEDURE — 97164 PT RE-EVAL EST PLAN CARE: CPT

## 2023-02-08 PROCEDURE — 74011250637 HC RX REV CODE- 250/637: Performed by: INTERNAL MEDICINE

## 2023-02-08 PROCEDURE — 74011000258 HC RX REV CODE- 258: Performed by: SURGERY

## 2023-02-08 PROCEDURE — 74011000250 HC RX REV CODE- 250: Performed by: SURGERY

## 2023-02-08 PROCEDURE — 74011250637 HC RX REV CODE- 250/637: Performed by: SURGERY

## 2023-02-08 PROCEDURE — 36415 COLL VENOUS BLD VENIPUNCTURE: CPT

## 2023-02-08 PROCEDURE — 36592 COLLECT BLOOD FROM PICC: CPT

## 2023-02-08 PROCEDURE — 74011250636 HC RX REV CODE- 250/636: Performed by: INTERNAL MEDICINE

## 2023-02-08 PROCEDURE — 65270000029 HC RM PRIVATE

## 2023-02-08 PROCEDURE — 74011250636 HC RX REV CODE- 250/636: Performed by: HOSPITALIST

## 2023-02-08 PROCEDURE — 82962 GLUCOSE BLOOD TEST: CPT

## 2023-02-08 PROCEDURE — 74011250636 HC RX REV CODE- 250/636: Performed by: SURGERY

## 2023-02-08 PROCEDURE — 74011250637 HC RX REV CODE- 250/637: Performed by: HOSPITALIST

## 2023-02-08 PROCEDURE — 74011636637 HC RX REV CODE- 636/637: Performed by: SURGERY

## 2023-02-08 PROCEDURE — 80048 BASIC METABOLIC PNL TOTAL CA: CPT

## 2023-02-08 RX ORDER — ONDANSETRON 2 MG/ML
4 INJECTION INTRAMUSCULAR; INTRAVENOUS
Status: DISCONTINUED | OUTPATIENT
Start: 2023-02-08 | End: 2023-02-09 | Stop reason: HOSPADM

## 2023-02-08 RX ORDER — KETOROLAC TROMETHAMINE 15 MG/ML
15 INJECTION, SOLUTION INTRAMUSCULAR; INTRAVENOUS ONCE
Status: COMPLETED | OUTPATIENT
Start: 2023-02-08 | End: 2023-02-08

## 2023-02-08 RX ORDER — SILDENAFIL CITRATE 20 MG/1
100 TABLET ORAL 2 TIMES DAILY
Status: DISCONTINUED | OUTPATIENT
Start: 2023-02-08 | End: 2023-02-09 | Stop reason: HOSPADM

## 2023-02-08 RX ADMIN — HEPARIN SODIUM 5000 UNITS: 5000 INJECTION INTRAVENOUS; SUBCUTANEOUS at 21:54

## 2023-02-08 RX ADMIN — DORZOLAMIDE HYDROCHLORIDE AND TIMOLOL MALEATE 1 DROP: 20; 5 SOLUTION/ DROPS OPHTHALMIC at 22:02

## 2023-02-08 RX ADMIN — PANTOPRAZOLE SODIUM 40 MG: 40 TABLET, DELAYED RELEASE ORAL at 07:30

## 2023-02-08 RX ADMIN — Medication 2 UNITS: at 08:18

## 2023-02-08 RX ADMIN — Medication 2 UNITS: at 22:18

## 2023-02-08 RX ADMIN — VERAPAMIL HYDROCHLORIDE 120 MG: 120 TABLET ORAL at 08:18

## 2023-02-08 RX ADMIN — SILDENAFIL 100 MG: 20 TABLET, FILM COATED ORAL at 22:20

## 2023-02-08 RX ADMIN — BUPROPION HYDROCHLORIDE 300 MG: 150 TABLET, EXTENDED RELEASE ORAL at 08:18

## 2023-02-08 RX ADMIN — HEPARIN SODIUM 5000 UNITS: 5000 INJECTION INTRAVENOUS; SUBCUTANEOUS at 12:45

## 2023-02-08 RX ADMIN — HYDROMORPHONE HYDROCHLORIDE 1 MG: 1 INJECTION, SOLUTION INTRAMUSCULAR; INTRAVENOUS; SUBCUTANEOUS at 22:27

## 2023-02-08 RX ADMIN — HEPARIN SODIUM 5000 UNITS: 5000 INJECTION INTRAVENOUS; SUBCUTANEOUS at 05:00

## 2023-02-08 RX ADMIN — CEFTRIAXONE 2 G: 2 INJECTION, POWDER, FOR SOLUTION INTRAMUSCULAR; INTRAVENOUS at 13:03

## 2023-02-08 RX ADMIN — SODIUM CHLORIDE, PRESERVATIVE FREE 10 ML: 5 INJECTION INTRAVENOUS at 06:07

## 2023-02-08 RX ADMIN — ACETAMINOPHEN 650 MG: 325 TABLET ORAL at 00:53

## 2023-02-08 RX ADMIN — ACETAMINOPHEN 650 MG: 325 TABLET ORAL at 08:22

## 2023-02-08 RX ADMIN — Medication 6 UNITS: at 08:10

## 2023-02-08 RX ADMIN — ONDANSETRON 4 MG: 2 INJECTION INTRAMUSCULAR; INTRAVENOUS at 08:16

## 2023-02-08 RX ADMIN — ROSUVASTATIN CALCIUM 40 MG: 10 TABLET, COATED ORAL at 21:55

## 2023-02-08 RX ADMIN — KETOROLAC TROMETHAMINE 15 MG: 15 INJECTION, SOLUTION INTRAMUSCULAR; INTRAVENOUS at 12:45

## 2023-02-08 RX ADMIN — VERAPAMIL HYDROCHLORIDE 120 MG: 120 TABLET ORAL at 21:55

## 2023-02-08 RX ADMIN — DORZOLAMIDE HYDROCHLORIDE AND TIMOLOL MALEATE 1 DROP: 20; 5 SOLUTION/ DROPS OPHTHALMIC at 08:19

## 2023-02-08 RX ADMIN — HYDROMORPHONE HYDROCHLORIDE 1 MG: 1 INJECTION, SOLUTION INTRAMUSCULAR; INTRAVENOUS; SUBCUTANEOUS at 01:24

## 2023-02-08 RX ADMIN — HYDRALAZINE HYDROCHLORIDE 20 MG: 20 INJECTION INTRAMUSCULAR; INTRAVENOUS at 03:50

## 2023-02-08 RX ADMIN — LISINOPRIL 40 MG: 20 TABLET ORAL at 08:18

## 2023-02-08 RX ADMIN — OXYCODONE HYDROCHLORIDE 5 MG: 5 TABLET ORAL at 00:47

## 2023-02-08 RX ADMIN — HYDROMORPHONE HYDROCHLORIDE 1 MG: 1 INJECTION, SOLUTION INTRAMUSCULAR; INTRAVENOUS; SUBCUTANEOUS at 06:05

## 2023-02-08 RX ADMIN — SILDENAFIL 100 MG: 20 TABLET, FILM COATED ORAL at 12:45

## 2023-02-08 NOTE — ROUTINE PROCESS
Bedside shift change report given to KENDRICK Santana RN (oncoming nurse) by Sintia Haider (offgoing nurse). Report included the following information SBAR, Kardex, Intake/Output, and MAR.

## 2023-02-08 NOTE — PROGRESS NOTES
[facility]  (A Division of [address] [place] [address])                                                           Date of Admission: 2/1/2023   Date of Note: 2/7/2023  Reason for Consult: cellulitis- right leg  Referring MD: Dr Karie Loya    Current Antimicrobials:    Prior Antimicrobials:  Zosyn -> Ceftriaxone and Vancomycin 2/1 to date   Clindamycin PO PTA  Ceftriaxone 1 gm IV X1 --2/1/23   Allergy to antibiotics: None     Assessment:     Severe sepsis / leucocytosis / elevated lactic acid 2/2 below-- non sustained hypotension: Improved  Localized rash on Right thigh/groin area-- could be contact dermatitis  . If becomes diffuse, could be drug related  Right proximal thigh SSI/Cellulitis with deep multi-loculated abscess due to S.pyogenes  --FU US with 7.9cm X 7.9cm by 2.7 cm   S/P I and D of right thigh abscess 2/4/23  --Per op note: \". .abscess started 2 cm from skin, expressed about 100cc of pus, extended all the way to myofascial compartment but did not appear to go beyond it. [place] \"  --OR culture 2/4/23--GPC in pair on GS, no aerobic/anaerobic growth so far  --MRSA screen negative  WCX 2/1-- heavy Gp A strep, ligh CoNS  Leucocytosis: Declining 12.8K  VIKTOR-- acute  due to above: improved  Pre-diabetes-- On Metformin by history  SHAHRIAR  Morbidly obese- BMI 48  Pul Hypertension-- was on Viagra  History of back surgery- 2016    Recommendation -- ID related:     OR culture-- Negative so far-- GPC on GS  Watch/monitor rash  Ceftriaxone 2 gm IV daily X2 weeks from 2/4/23-- End date: 2/17/23  ( OPAT Order placed in [facility] AT [facility]-- if rash becomes diffuse, will need to change it)  Wound orders per surgical team for now, can be placed with me in wound clinic if needed on DC  IFEOMA wound RN, wound examined together      Subjective:     R groin area/ upper thigh rash-- he denies itching  HA that seems to worsen with BP treatment  Afebrile  L arm PICC Placed    Notes/Labs/Cultures and Imaging reports reviewed. HPI:       Leandro Kearney is a 46 y.o. male with hypertension, GERD, sleep apnea ,Pul hypertension and Obese. He had a fall 4 weeks ago which happened when he was pushing heavy set equipment. Had small wound with pain over there that later became red and was given Clindamycin by his PCP- > a week ago. It got better initially, but later he noted anterior thigh -right- swelling and pain, had fever/chills and body aches daily for >4-5 days. He came in to ED on 2/2-- found to have leucocytosis, briefly hypotensive, no tachycardia or hypoxia. CT showed R thigh cellulitis with ill defined phlegmon -- no drain able abscess. No fracture of OM. C/O of headaches, right thigh pain, but no N/V/Diarrhea/SOB/ abdominal pain. Voiding urine without any issue. Currently on Vancomycin and zosyn. He is unemployed due to back pain.  He used to work in construction    Active Hospital Problems    Diagnosis Date Noted    Abscess of right thigh 02/03/2023    Controlled type 2 diabetes mellitus, without long-term current use of insulin ([facility] [address] 02/02/2023    Lactic acidosis 02/02/2023    Cellulitis 02/01/2023    Hypotension 02/01/2023    Sepsis ([address] 02/01/2023    VIKTOR (acute kidney injury) ([facility] [address] 02/01/2023    Septic shock ([facility] [address] 02/01/2023    SHAHRIAR (obstructive sleep apnea) 02/01/2023    BMI 45.0-49.9, adult ([facility] [address] 02/01/2023     Past Medical History:   Diagnosis Date    Arthritis     spine    Chronic pain     lumbar    GERD (gastroesophageal reflux disease)     Sleep apnea     CPAP in the past, was 300 lbs, lost weight     Past Surgical History:   Procedure Laterality Date    HX ORTHOPAEDIC  1990    right ankle surgery       Medications:  Current Facility-Administered Medications   Medication Dose Route Frequency    heparin (porcine) 100 unit/mL injection 500 Units  500 Units InterCATHeter Q8H PRN    lisinopriL (PRINIVIL, ZESTRIL) tablet 40 mg  40 mg Oral DAILY    hydrALAZINE (APRESOLINE) 20 mg/mL injection 20 mg  20 mg IntraVENous Q6H PRN    sildenafil citrate (VIAGRA) tablet 100 mg  100 mg Oral DAILY    verapamiL (CALAN) tablet 120 mg  120 mg Oral BID    buPROPion XL (WELLBUTRIN XL) tablet 300 mg  300 mg Oral DAILY    rosuvastatin (CRESTOR) tablet 40 mg  40 mg Oral QHS    oxyCODONE IR (ROXICODONE) tablet 5 mg  5 mg Oral Q4H PRN    HYDROmorphone (DILAUDID) injection 1 mg  1 mg IntraVENous Q4H PRN    insulin glargine (LANTUS) injection 6 Units  6 Units SubCUTAneous DAILY    cefTRIAXone (ROCEPHIN) 2 g in 0.9% sodium chloride (MBP/ADV) 50 mL MBP  2 g IntraVENous Q24H    acetaminophen (TYLENOL) tablet 650 mg  650 mg Oral Q6H PRN    insulin lispro (HUMALOG) injection   SubCUTAneous AC&HS    glucose chewable tablet 16 g  4 Tablet Oral PRN    glucagon (GLUCAGEN) injection 1 mg  1 mg IntraMUSCular PRN    dextrose 10% infusion 0-250 mL  0-250 mL IntraVENous PRN    dorzolamide-timoloL (COSOPT) 22.3-6.8 mg/mL ophthalmic solution 1 Drop  1 Drop Both Eyes BID    sodium chloride (NS) flush 5-10 mL  5-10 mL IntraVENous PRN    heparin (porcine) injection 5,000 Units  5,000 Units SubCUTAneous Q8H    pantoprazole (PROTONIX) tablet 40 mg  40 mg Oral ACB        Review of Systems     Negative Unless BOLDED     General: fevers, chills, myalgias, arthralgias, unexplained weight loss, malaise, fatigue.   HEENT:  headaches, recent URI  PUlMONARY:  cough , shortness of breath  Cardiovascular: chest pain         Objective:       Visit Vitals  BP (!) 170/101 (BP 1 Location: Right lower arm, BP Patient Position: Sitting)   Pulse 79   Temp 97.2 °F (36.2 °C)   Resp 22   Ht 5' 9\" (1.753 m)   Wt 157.8 kg (347 lb 14.2 oz)   SpO2 98%   BMI 51.37 kg/m²     Temp (24hrs), Av.6 °F (36.4 °C), Min:97.2 °F (36.2 °C), Max:97.8 °F (36.6 °C)      Lines: PICC-Left arm     General:   WD Obese , on RA awake alert and oriented   Skin:   Localized rash on Right upper thigh/groin  Edematous right proximal thigh/groin area  I and D surgical wound with deep abscess- goes to 6cm--on exam 2/6- wound not seen today     HEENT:  Normocephalic, atraumatic       Lungs:   non-labored       Abdomen:  soft, severely obese, active bowel sounds. Appropriate surgical scars for stated surgeries. Non-tender   Genitourinary:  deferred   Extremities:   no clubbing, cyanosis; no joint effusions or swelling; ; muscle mass appropriate for age   Neurologic:  No gross focal sensory abnormalities;  Cranial nerves intact   Psychiatric:   appropriate and interactive. Labs: Results:   Chemistry Recent Labs     02/06/23 0513 02/05/23  0720   * 207*    138   K 4.9 4.7    104   CO2 29 28   BUN 23* 21*   CREA 1.22 1.29   CA 8.6 8.8   AGAP 5 6   BUCR 19 16   AP 83  --    TP 6.8  --    ALB 2.8*  --    GLOB 4.0  --    AGRAT 0.7*  --       CBC w/Diff Recent Labs     02/06/23 0513 02/05/23  0720   WBC 12.8 16.8*   RBC 4.82 4.85   HGB 13.0 13.4   HCT 42.2 43.1    261   GRANS 73  --    LYMPH 11*  --    EOS 2  --             No results found for: SDES Lab Results   Component Value Date/Time    Culture result: NO GROWTH THUS FAR 02/04/2023 02:59 PM    Culture result: NO GROWTH THUS FAR 02/04/2023 02:59 PM    Culture result: MRSA NOT PRESENT 02/02/2023 02:08 PM    Culture result:  02/02/2023 02:08 PM     Screening of patient nares for MRSA is for surveillance purposes and, if positive, to facilitate isolation considerations in high risk settings. It is not intended for automatic decolonization interventions per se as regimens are not sufficiently effective to warrant routine use. Culture result: (A) 02/01/2023 03:20 PM     HEAVY Streptococci, beta hemolytic group A Penicillin and ampicillin are drugs of choice for treatment of beta-hemolytic streptococcal infections. Susceptibility testing of penicillins and beta-lactams approved by the FDA for treatment of beta-hemolytic streptococcal infections need not be performed routinely, because nonsusceptible isolates are extremely rare.  CLSI 2012      Culture result: FEW STAPHYLOCOCCUS SPECIES, COAGULASE NEGATIVE (A) 02/01/2023 03:20 PM        Results       Procedure Component Value Units Date/Time    CULTURE, ANAEROBIC [[id number]] Collected: 02/04/23 1459    Order Status: Completed Specimen: Surgical Specimen Updated: 02/06/23 0923     Special Requests: NO SPECIAL REQUESTS        Culture result: NO GROWTH THUS FAR       CULTURE, Kedar Jacobsen STAIN [[id number]] Collected: 02/04/23 1459    Order Status: Completed Specimen: Wound from Leg Updated: 02/06/23 0922     Special Requests: NO SPECIAL REQUESTS        GRAM STAIN       OCCASIONAL Gram positive cocci PAIRS            RARE WBCS SEEN        Culture result: NO GROWTH THUS FAR       CULTURE, MRSA [[id number]] Collected: 02/02/23 1408    Order Status: Completed Specimen: Nasal from Nares Updated: 02/03/23 2349     Special Requests: NO SPECIAL REQUESTS        Culture result: MRSA NOT PRESENT               Screening of patient nares for MRSA is for surveillance purposes and, if positive, to facilitate isolation considerations in high risk settings. It is not intended for automatic decolonization interventions per se as regimens are not sufficiently effective to warrant routine use. CULTURE, BLOOD [[id number]] Collected: 02/02/23 0400    Order Status: Canceled Specimen: Blood     CULTURE, Kedar Jacobsen STAIN [[id number]]  (Abnormal) Collected: 02/01/23 1520    Order Status: Completed Specimen: Wound from Abscess Updated: 02/03/23 1320     Special Requests: --        RIGHT  HIP       GRAM STAIN OCCASIONAL WBCS SEEN               2+ Gram positive cocci IN PAIRS           Culture result:       HEAVY Streptococci, beta hemolytic group A Penicillin and ampicillin are drugs of choice for treatment of beta-hemolytic streptococcal infections.  Susceptibility testing of penicillins and beta-lactams approved by the FDA for treatment of beta-hemolytic streptococcal infections need not be performed routinely, because nonsusceptible isolates are extremely rare. CLSI 2012              FEW STAPHYLOCOCCUS SPECIES, COAGULASE NEGATIVE          CULTURE, BLOOD [[id number]] Collected: 02/01/23 1500    Order Status: Completed Specimen: Blood Updated: 02/07/23 0724     Special Requests: NO SPECIAL REQUESTS        Culture result: NO GROWTH 6 DAYS                 Imaging: All imaging reviewed from Admission to present as per radiology interpretation in [address] [facility].  Erika Walker MD  [facility])  Office #:     [phone]  [id number] [phone]   Office Fax: [phone]

## 2023-02-08 NOTE — PROGRESS NOTES
Problem: Mobility Impaired (Adult and Pediatric)  Goal: *Acute Goals and Plan of Care (Insert Text)  Description: Physical Therapy Goals  Initiated 2/8/2023 and to be accomplished within 7 day(s)  1. Patient will ambulate with modified independence for 200 feet with the least restrictive device. 2.  Patient will ascend/descend 5 stairs with 2 handrail(s) with modified independence. 3.  Patient will transfer from bed to chair and chair to bed with modified independence using the least restrictive device. Physical Therapy Goals  Initiated 2/3/2023 and to be accomplished within 7 day(s)  1. Patient will move from supine to sit and sit to supine  in bed with supervision/set-up. - Met    2. Patient will transfer from bed to chair and chair to bed with modified independence using the least restrictive device. 3.  Patient will perform sit to stand with modified independence. - met  4. Patient will ambulate with modified independence for 200 feet with the least restrictive device. - ongoing   5. Patient will ascend/descend 5 stairs with 2 handrail(s) with modified independence. Outcome: Progressing Towards Goal    PHYSICAL THERAPY RE-EVALUATION    Patient: Cruz Mcclendon (6 y.o. male)  Date: 2/8/2023  Primary Diagnosis: Sepsis ([facility] [address] [A41.9]  Cellulitis [L03.90]  Hypotension [I95.9]  VIKTOR (acute kidney injury) ([facility] [address] [N17.9]  Procedure(s) (LRB):  INCISION AND DRAINAGE RIGHT THIGH (Right) 4 Days Post-Op   Precautions:   Fall  PLOF: Independent w/o AD    ASSESSMENT :  Pt continues to have globally decreased strength (but much improved RLE), decreased activity tolerance, and gait deviations as noted below. This was a limited re-eval due to Pt complaints of 7/10 headache pain upon entering room. Pt agreed to work with PT despite the above. Pt was found seated in bed and transitioned to EOB independently. STS S/RW. Pt amb 16ft S/mod I/RW, and demonstrated good RW placement and safety.  Pt required no cues for safety with stand to sit, and remembered to reach for bed prior to sitting with RW. Pt reported 7/10 throbbing head pain at end of session (nurse Maggi notified). Pt expects to be d/c home when he is medically stable. Pt was left at EOB with all needs in reach, nurse Tay notified of pt condition post encounter. Pt would continue to benefit from skilled physical therapy to continue progression towards stated goals. Pt would benefit from HHPT at d/c. Patient will benefit from skilled intervention to address the above impairments. Patient's rehabilitation potential is considered to be Good  Factors which may influence rehabilitation potential include:   []         None noted  []         Mental ability/status  [x]         Medical condition  []         Home/family situation and support systems  []         Safety awareness  [x]         Pain tolerance/management  []         Other:      PLAN :  Recommendations and Planned Interventions:   []           Bed Mobility Training             []    Neuromuscular Re-Education  []           Transfer Training                   []    Orthotic/Prosthetic Training  [x]           Gait Training                          []    Modalities  [x]           Therapeutic Exercises           []    Edema Management/Control  [x]           Therapeutic Activities            []    Family Training/Education  [x]           Patient Education  []           Other (comment):    Frequency/Duration: Patient will be followed by physical therapy 1-2 times per day/4-7 days per week to address goals. Discharge Recommendations: Home Physical Therapy  Further Equipment Recommendations for Discharge: rolling walker and N/A    AMPAC: 17/20    This AMPAC score should be considered in conjunction with interdisciplinary team recommendations to determine the most appropriate discharge setting. Patient's social support, diagnosis, medical stability, and prior level of function should also be taken into consideration. SUBJECTIVE:   Patient stated I was throwing up this morning because my head was hurting so bad.     OBJECTIVE DATA SUMMARY:   Hospital course since last seen and reason for re-evaluation: see above assessment. Past Medical History:   Diagnosis Date    Arthritis     spine    Chronic pain     lumbar    GERD (gastroesophageal reflux disease)     Sleep apnea     CPAP in the past, was 300 lbs, lost weight     Past Surgical History:   Procedure Laterality Date    HX ORTHOPAEDIC  1990    right ankle surgery     Barriers to Learning/Limitations: None  Compensate with: N/A  Home Situation:   Home Situation  Home Environment: Private residence  # Steps to Enter: 5  Rails to Enter: Yes  Hand Rails : Bilateral  Wheelchair Ramp: No  One/Two Story Residence: Two story, live on 1st floor  Living Alone: No  Support Systems: Spouse/Significant Other  Patient Expects to be Discharged to[de-identified] Home with home health  Current DME Used/Available at Home: Melvia Crofts, rolling, Wheelchair  Tub or Shower Type: Tub/Shower combination  Critical Behavior:  Neurologic State: Alert  Orientation Level: Oriented X4  Cognition: Appropriate decision making; Appropriate for age attention/concentration; Appropriate safety awareness; Follows commands  Psychosocial  Patient Behaviors: Calm; Cooperative  Needs Expressed: Educational  Purposeful Interaction: Yes  Pt Identified Daily Priority: Clinical issues (comment)  Caritas Process: Establish trust;Teaching/learning; Attend basic human needs  Caring Interventions: Reassure  Skin Condition/Temp: Warm  Skin Integrity: Incision (comment)  Skin Integumentary  Skin Color: Ecchymosis (comment)  Skin Condition/Temp: Warm  Skin Integrity: Incision (comment)  Turgor: Non-tenting  Strength:    Strength: Generally decreased, functional (RLE improved since initial encounter)  Tone & Sensation:   Tone: Normal  Sensation: Intact  Range Of Motion:  AROM: Generally decreased, functional  Functional Mobility:  Bed Mobility:  Supine to Sit: Independent  Sit to Supine: Independent  Scooting: Independent  Transfers:  Sit to Stand: Supervision  Stand to Sit: Supervision  Balance:   Sitting: Intact  Standing: Impaired; With support  Standing - Static: Fair  Standing - Dynamic : Fair  Ambulation/Gait Training:  Distance (ft): 16 Feet (ft)  Assistive Device: Gait belt;Walker, rolling  Ambulation - Level of Assistance: Supervision;Modified independent  Gait Description (WDL): Exceptions to WDL  Speed/Denice: Slow  Pain:  Pain level pre-treatment: 7/10   Pain level post-treatment: 7/10   Pain Intervention(s) : Medication (see MAR); Rest, Ice, Repositioning   Response to intervention: Nurse notified, See doc flow  Activity Tolerance:   Fair  Please refer to the flowsheet for vital signs taken during this treatment. After treatment:   []         Patient left in no apparent distress sitting up in chair  [x]         Patient left in no apparent distress in bed  [x]         Call bell left within reach  [x]         Nursing notified  []         Caregiver present  []         Bed alarm activated  []         SCDs applied    COMMUNICATION/EDUCATION:   [x]         Role of Physical Therapy in the acute care setting. [x]         Fall prevention education was provided and the patient/caregiver indicated understanding. [x]         Patient/family have participated as able in goal setting and plan of care. [x]         Patient/family agree to work toward stated goals and plan of care. []         Patient understands intent and goals of therapy, but is neutral about his/her participation. []         Patient is unable to participate in goal setting/plan of care: ongoing with therapy staff.  []         Other:     Thank you for this referral.  Penelope Tang, [facility]   Time Calculation: 15 mins    Gautam Lind AM-PAC® Basic Mobility Inpatient Short Form (6-Clicks) Version 2    How much HELP from another person does the patient currently need    (If the patient hasn't done an activity recently, how much help from another person do you think he/she would need if he/she tried?)   Total (Total A or Dep)   A Lot  (Mod to Max A)   A Little (Sup or Min A)   None (Mod I to I)   Turning from your back to your side while in a flat bed without using bedrails? [] 1 [] 2 [] 3 [x] 4   2. Moving from lying on your back to sitting on the side of a flat bed without using bedrails? [] 1 [] 2 [] 3 [x] 4   3. Moving to and from a bed to a chair (including a wheelchair)? [] 1 [] 2 [x] 3 [] 4   4. Standing up from a chair using your arms (e.g., wheelchair, or bedside chair)? [] 1 [] 2 [x] 3 [] 4   5. Walking in hospital room? [] 1 [] 2 [x] 3 [] 4   6. Climbing 3-5 steps with a railing?+   [] 1 [] 2 [] 3 [] 4   +If stair climbing cannot be assessed, skip item #6. Sum responses from items 1-5. Based on an AM-PAC score of **/24 (or 17/20 if omitting stairs) and their current functional mobility deficits, it is recommended that the patient have 2-3 sessions per week of Physical Therapy at d/c to increase the patient's independence.

## 2023-02-08 NOTE — PROGRESS NOTES
Spoke with pharmacist in regards to sildenafil, reports waiting on verification of dose, pt able to provide recent empty medication bottle of current prescription. Pharmacist made aware, read prescription over the phone to pharmacist as well as prescribing doctor's name. Pharmacist to reach out to hospitalist to complete verification.

## 2023-02-08 NOTE — PROGRESS NOTES
Hospitalist Progress Note    Patient: Vishal Purcell MRN: [id number]  CSN: [id number]    YOB: 1970  Age: 46 y.o. Sex: male    DOA: 2/1/2023 LOS:  LOS: 7 days                Assessment/Plan     Patient Active Problem List   Diagnosis Code    Lumbar stenosis M48.061    Cellulitis L03.90    Sepsis ([facility] [address] A41.9    VIKTOR (acute kidney injury) ([facility] [address] N17.9    SHAHRIAR (obstructive sleep apnea) G47.33    BMI 45.0-49.9, adult ([facility] [address] Z68.42    Controlled type 2 diabetes mellitus, without long-term current use of insulin (HCC) E11.9    Lactic acidosis E87.20    Abscess of right thigh L02.415    HTN (hypertension) I10        Chief complaint :  Sepsis, cellulitis. 46 y.o. male with hypertension, GERD, sleep apnea presents to ER with concerns of right thigh wound, redness, swelling. Abscess/Cellulitis - right thigh  S/p I&D by general surgery  ID following  OR culture pending  On ceftriaxone. ID recommend 2 weeks course. PICC placed    Septic shock -   resolved  Received albumin,   Off levophed    HTN -  uncontrolled  On home meds  Lisinopril added  Monitor BP  Hydralazine as needed. Pulmonary HTN -  Continue on sildenafil      Heme/onc - Follow H&H, plts. VIKTOR -   resolved  renal US with no obstruction  Avoid nephrotoxins  Seen by Nephrology    DM -  On lantus, SSI. BMI of 52     Prophylaxis - DVT: heparin, GI: protonix. Estimated length of stay : 1-2 days. Review of systems  General: No fevers or chills. Cardiovascular: No chest pain or pressure. No palpitations. Pulmonary: No shortness of breath. Gastrointestinal: No nausea, vomiting. Physical Exam:  General: Awake, cooperative, no acute distress    HEENT: NC, Atraumatic. PERRLA, anicteric sclerae. Lungs: CTA Bilaterally. No Wheezing/Rhonchi/Rales. Heart:  S1 S2,  No murmur, No Rubs, No Gallops  Abdomen: Soft, Non distended, Non tender.   +Bowel sounds,   Extremities: Right thigh around groin and right hip are with redness, swelling and induration. Dressing in place  Psych:   Not anxious or agitated. Neurologic:  No acute neurological deficit. Vital signs/Intake and Output:  Visit Vitals  /70   Pulse 72   Temp 97.6 °F (36.4 °C)   Resp 18   Ht 5' 9\" (1.753 m)   Wt 149.3 kg (329 lb 2.4 oz)   SpO2 96%   BMI 48.61 kg/m²     Current Shift:  No intake/output data recorded. Last three shifts:  02/06 1901 - 02/08 0700  In: 240 [P.O.:240]  Out: 4200 [Urine:4200]            Labs: Results:       Chemistry Recent Labs     02/08/23  0350 02/06/23  0513   * 125*    139   K 4.2 4.9    105   CO2 26 29   BUN 20* 23*   CREA 1.09 1.22   CA 8.9 8.6   AGAP 8 5   BUCR 18 19   AP  --  83   TP  --  6.8   ALB  --  2.8*   GLOB  --  4.0   AGRAT  --  0.7*      CBC w/Diff Recent Labs     02/06/23  0513   WBC 12.8   RBC 4.82   HGB 13.0   HCT 42.2      GRANS 73   LYMPH 11*   EOS 2      Cardiac Enzymes No results for input(s): CPK, CKND1, ADELINA in the last 72 hours. No lab exists for component: CKRMB, TROIP   Coagulation No results for input(s): PTP, INR, APTT, INREXT, INREXT in the last 72 hours. Lipid Panel No results found for: CHOL, CHOLPOCT, CHOLX, CHLST, CHOLV, 734582, HDL, HDLP, LDL, LDLC, DLDLP, 847420, VLDLC, VLDL, TGLX, TRIGL, TRIGP, TGLPOCT, CHHD, CHHDX   BNP No results for input(s): BNPP in the last 72 hours.    Liver Enzymes Recent Labs     02/06/23  0513   TP 6.8   ALB 2.8*   AP 83      Thyroid Studies No results found for: T4, T3U, TSH, TSHEXT, TSHEXT     Procedures/imaging: see electronic medical records for all procedures/Xrays and details which were not copied into this note but were reviewed prior to creation of Plan

## 2023-02-09 VITALS
TEMPERATURE: 97.3 F | HEIGHT: 69 IN | WEIGHT: 315 LBS | RESPIRATION RATE: 18 BRPM | DIASTOLIC BLOOD PRESSURE: 84 MMHG | BODY MASS INDEX: 46.65 KG/M2 | SYSTOLIC BLOOD PRESSURE: 124 MMHG | OXYGEN SATURATION: 97 % | HEART RATE: 79 BPM

## 2023-02-09 LAB
ANION GAP SERPL CALC-SCNC: 6 MMOL/L (ref 3–18)
BACTERIA SPEC CULT: NORMAL
BACTERIA SPEC CULT: NORMAL
BUN SERPL-MCNC: 22 MG/DL (ref 7–18)
BUN/CREAT SERPL: 19 (ref 12–20)
CALCIUM SERPL-MCNC: 9.1 MG/DL (ref 8.5–10.1)
CHLORIDE SERPL-SCNC: 104 MMOL/L (ref 100–111)
CO2 SERPL-SCNC: 27 MMOL/L (ref 21–32)
CREAT SERPL-MCNC: 1.18 MG/DL (ref 0.6–1.3)
GLUCOSE BLD STRIP.AUTO-MCNC: 120 MG/DL (ref 70–110)
GLUCOSE BLD STRIP.AUTO-MCNC: 181 MG/DL (ref 70–110)
GLUCOSE SERPL-MCNC: 126 MG/DL (ref 74–99)
GRAM STN SPEC: NORMAL
GRAM STN SPEC: NORMAL
POTASSIUM SERPL-SCNC: 4 MMOL/L (ref 3.5–5.5)
SERVICE CMNT-IMP: NORMAL
SERVICE CMNT-IMP: NORMAL
SODIUM SERPL-SCNC: 137 MMOL/L (ref 136–145)

## 2023-02-09 PROCEDURE — 74011636637 HC RX REV CODE- 636/637: Performed by: SURGERY

## 2023-02-09 PROCEDURE — 80048 BASIC METABOLIC PNL TOTAL CA: CPT

## 2023-02-09 PROCEDURE — 74011250637 HC RX REV CODE- 250/637: Performed by: INTERNAL MEDICINE

## 2023-02-09 PROCEDURE — 82962 GLUCOSE BLOOD TEST: CPT

## 2023-02-09 PROCEDURE — 74011250637 HC RX REV CODE- 250/637: Performed by: SURGERY

## 2023-02-09 PROCEDURE — 74011250637 HC RX REV CODE- 250/637: Performed by: HOSPITALIST

## 2023-02-09 PROCEDURE — 74011250636 HC RX REV CODE- 250/636: Performed by: INTERNAL MEDICINE

## 2023-02-09 PROCEDURE — 36415 COLL VENOUS BLD VENIPUNCTURE: CPT

## 2023-02-09 PROCEDURE — 74011250636 HC RX REV CODE- 250/636: Performed by: SURGERY

## 2023-02-09 PROCEDURE — 74011000258 HC RX REV CODE- 258: Performed by: SURGERY

## 2023-02-09 RX ADMIN — HYDROMORPHONE HYDROCHLORIDE 1 MG: 1 INJECTION, SOLUTION INTRAMUSCULAR; INTRAVENOUS; SUBCUTANEOUS at 08:46

## 2023-02-09 RX ADMIN — OXYCODONE HYDROCHLORIDE 5 MG: 5 TABLET ORAL at 02:19

## 2023-02-09 RX ADMIN — CEFTRIAXONE 2 G: 2 INJECTION, POWDER, FOR SOLUTION INTRAMUSCULAR; INTRAVENOUS at 12:11

## 2023-02-09 RX ADMIN — OXYCODONE HYDROCHLORIDE 5 MG: 5 TABLET ORAL at 08:21

## 2023-02-09 RX ADMIN — Medication 2 UNITS: at 08:22

## 2023-02-09 RX ADMIN — HEPARIN SODIUM 5000 UNITS: 5000 INJECTION INTRAVENOUS; SUBCUTANEOUS at 06:53

## 2023-02-09 RX ADMIN — Medication 6 UNITS: at 08:20

## 2023-02-09 RX ADMIN — HEPARIN SODIUM 5000 UNITS: 5000 INJECTION INTRAVENOUS; SUBCUTANEOUS at 12:11

## 2023-02-09 RX ADMIN — VERAPAMIL HYDROCHLORIDE 120 MG: 120 TABLET ORAL at 08:20

## 2023-02-09 RX ADMIN — BUPROPION HYDROCHLORIDE 300 MG: 150 TABLET, EXTENDED RELEASE ORAL at 08:21

## 2023-02-09 RX ADMIN — LISINOPRIL 40 MG: 20 TABLET ORAL at 08:21

## 2023-02-09 RX ADMIN — DORZOLAMIDE HYDROCHLORIDE AND TIMOLOL MALEATE 1 DROP: 20; 5 SOLUTION/ DROPS OPHTHALMIC at 08:24

## 2023-02-09 RX ADMIN — SILDENAFIL 100 MG: 20 TABLET, FILM COATED ORAL at 08:21

## 2023-02-09 RX ADMIN — HYDROMORPHONE HYDROCHLORIDE 1 MG: 1 INJECTION, SOLUTION INTRAMUSCULAR; INTRAVENOUS; SUBCUTANEOUS at 03:42

## 2023-02-09 RX ADMIN — OXYCODONE HYDROCHLORIDE 5 MG: 5 TABLET ORAL at 13:21

## 2023-02-09 RX ADMIN — PANTOPRAZOLE SODIUM 40 MG: 40 TABLET, DELAYED RELEASE ORAL at 06:53

## 2023-02-09 NOTE — DISCHARGE SUMMARY
Discharge Summary    Patient: Bertell Curling MRN: [id number]  CSN: [id number]    YOB: 1970  Age: 46 y.o. Sex: male    DOA: 2/1/2023 LOS:  LOS: 8 days   Discharge Date:      Primary Care Provider:  Jen Cisneros MD    Admission Diagnoses: Sepsis ([place] [address] [A41.9]  Cellulitis [L03.90]  Hypotension [I95.9]  VIKTOR (acute kidney injury) ([place] [address] [N17.9]    Discharge Diagnoses:    Problem List as of 2/9/2023 Date Reviewed: 2/4/2023            Codes Class Noted - Resolved    HTN (hypertension) ICD-10-CM: I10  ICD-9-CM: 401.9  2/8/2023 - Present        Abscess of right thigh ICD-10-CM: L02.415  ICD-9-CM: 682.6  2/3/2023 - Present        Controlled type 2 diabetes mellitus, without long-term current use of insulin ([place] [address] ICD-10-CM: E11.9  ICD-9-CM: 250.00  2/2/2023 - Present        Lactic acidosis ICD-10-CM: E87.20  ICD-9-CM: 276.2  2/2/2023 - Present        * (Principal) Cellulitis ICD-10-CM: L03.90  ICD-9-CM: 682.9  2/1/2023 - Present        Sepsis ([place] [address] ICD-10-CM: A41.9  ICD-9-CM: 038.9, 995.91  2/1/2023 - Present        VIKTOR (acute kidney injury) ([place] [address] ICD-10-CM: N17.9  ICD-9-CM: 584.9  2/1/2023 - Present        SHAHRIAR (obstructive sleep apnea) ICD-10-CM: G47.33  ICD-9-CM: 327.23  2/1/2023 - Present        BMI 45.0-49.9, adult ([place] [address] ICD-10-CM: O47.23  ICD-9-CM: V85.42  2/1/2023 - Present        Lumbar stenosis ICD-10-CM: M48.061  ICD-9-CM: 724.02  1/25/2016 - Present        RESOLVED: Septic shock ([facility] [address] ICD-10-CM: A41.9, R65.21  ICD-9-CM: 038.9, 785.52, 995.92  2/1/2023 - 2/8/2023           Discharge Medications:     Current Discharge Medication List        START taking these medications    Details   cefTRIAXone 2 gram 2 g IVPB 2 g by IntraVENous route every twenty-four (24) hours for 7 doses.   Qty: 7 Dose, Refills: 0  Start date: 2/10/2023, End date: 2/17/2023           CONTINUE these medications which have NOT CHANGED    Details   sildenafil citrate (VIAGRA) 100 mg tablet Take 100 mg by mouth two (2) times a day.      metoprolol tartrate (LOPRESSOR) 100 mg IR tablet Take 100 mg by mouth two (2) times a day.      ezetimibe (ZETIA) 10 mg tablet Take 10 mg by mouth daily. rosuvastatin (CRESTOR) 40 mg tablet Take 40 mg by mouth nightly. latanoprost (XALATAN) 0.005 % ophthalmic solution Administer 1 Drop to both eyes nightly. metFORMIN ER (GLUCOPHAGE XR) 500 mg tablet Take 500 mg by mouth daily (with dinner). verapamiL (CALAN) 120 mg tablet Take 120 mg by mouth three (3) times daily. Only been taking 2x daily, took 1 tab this morning      buPROPion XL (WELLBUTRIN XL) 300 mg XL tablet Take 300 mg by mouth daily. oxyCODONE-acetaminophen (PERCOCET 10)  mg per tablet Take 1 Tab by mouth every six (6) hours as needed for Pain. STOP taking these medications       clindamycin (CLEOCIN) 300 mg capsule Comments:   Reason for Stopping:         lisinopril (PRINIVIL, ZESTRIL) 40 mg tablet Comments:   Reason for Stopping:         amLODIPine (NORVASC) 5 mg tablet Comments:   Reason for Stopping:         phenylephrine (NEOSYNEPHRINE) 1 % spry Comments:   Reason for Stopping:               Discharge Condition: Good    Procedures : I&D right thigh    Consults: General Surgery, Infectious Disease, and Pulmonary/Critical Care      PHYSICAL EXAM   Visit Vitals  /84 (BP 1 Location: Right upper arm, BP Patient Position: At rest;Sitting)   Pulse 79   Temp 97.3 °F (36.3 °C)   Resp 18   Ht 5' 9\" (1.753 m)   Wt 149.3 kg (329 lb 2.4 oz)   SpO2 97%   BMI 48.61 kg/m²     General: Awake, cooperative, no acute distress    HEENT: NC, Atraumatic. PERRLA, EOMI. Anicteric sclerae. Lungs:  CTA Bilaterally. No Wheezing/Rhonchi/Rales. Heart:  Regular  rhythm,  No murmur, No Rubs, No Gallops  Abdomen: Soft, Non distended, Non tender. +Bowel sounds,   Extremities: Right thigh, redness and swelling, dressing in place. Psych:   Not anxious or agitated. Neurologic:  No acute neurological deficits. Admission HPI :   Oswaldo Cisneros is a 46 y.o. male with hypertension, GERD, sleep apnea presents to ER with concerns of right thigh wound, redness, swelling. Patient reports that he may have fallen about 4 weeks ago. Initially he did not notice anything however later he noticed an open wound. He also started developing redness and swelling. He was seen by PCP who put him on clindamycin. Patient reports that it was improving however over the past couple of days his redness and swelling got worse. He denies any fever, chills, nausea, vomiting. The area of induration mostly on the groin site is very painful. In ER he was noted to be hypotensive with blood pressure of 77/64, white count at 28.1, BUN/creatinine of 25/3.26, lactic acid of 3.3. CT scan of right hip showed right thigh cellulitis. Ill-defined phlegmon in the proximal, anterior right thigh involve the right sartorius muscle. No drainable abscess at this point. No fracture or osteomyelitis. Hospital Course :   Mr. Elisa Goodell was admitted to ICU initially, he was seen and followed by PCCM, ID and general surgery. Once his condition improved, he was transferred to medical floor. Abscess/Cellulitis - right thigh  S/p I&D by general surgery  OR culture no growth  Gram stain showed GPC in pairs  On ceftriaxone. ID recommend 2 weeks course. PICC placed. ID also recommend wound vac. Septic shock -   resolved  Received albumin,   Off levophed     HTN -  Resume home medications     Pulmonary HTN -  Continue on sildenafil      VIKTOR -   resolved  renal US with no obstruction  Avoid nephrotoxins  Seen by Nephrology     DM -  Started on lantus, SSI. Resume home medications at discharge.     Activity: Activity as tolerated    Diet: Diabetic Diet    Follow-up: PCP, ID    Disposition: home with home health    Minutes spent on discharge: 45       Labs: Results:       Chemistry Recent Labs     02/09/23  0256 02/08/23  0350   * 175*  138   K 4.0 4.2    104   CO2 27 26   BUN 22* 20*   CREA 1.18 1.09   CA 9.1 8.9   AGAP 6 8   BUCR 19 18      CBC w/Diff No results for input(s): WBC, RBC, HGB, HCT, PLT, GRANS, LYMPH, EOS, HGBEXT, HCTEXT, PLTEXT in the last 72 hours. Cardiac Enzymes No results for input(s): CPK, CKND1, ADELINA in the last 72 hours. No lab exists for component: CKRMB, TROIP   Coagulation No results for input(s): PTP, INR, APTT, INREXT in the last 72 hours. Lipid Panel No results found for: CHOL, CHOLPOCT, CHOLX, CHLST, CHOLV, 723999, HDL, HDLP, LDL, LDLC, DLDLP, 574901, VLDLC, VLDL, TGLX, TRIGL, TRIGP, TGLPOCT, CHHD, CHHDX   BNP No results for input(s): BNPP in the last 72 hours. Liver Enzymes No results for input(s): TP, ALB, TBIL, AP in the last 72 hours. No lab exists for component: SGOT, GPT, DBIL   Thyroid Studies No results found for: T4, T3U, TSH, TSHEXT         Significant Diagnostic Studies: IR PICC INSERT WO PORT OVER 5 YEARS WO IMG    Result Date: 2023  PATIENT NAME: Rigo Esparza                                            AGE, : 52 years, 1970 MRN: [id number] DATE: 2023 1:38 PM SUPERVISING PHYSICIAN: Bala Gorman M.D. PROCEDURE(S): Peripherally Inserted Central Catheter with ultrasound and fluoroscopic guidance HISTORY: Requires access for medicine TECHNIQUE: Informed written consent was obtained from the patient following discussion of the risks, benefits, and alternatives to the procedure. The patient was brought to the procedure area and positively identified. A timeout was performed. The left upper extremity was prepped and draped using maximum sterile barrier technique which includes: cap and mask, sterile gown, sterile gloves, and sterile body drape. The ultrasound transducer was prepped using sterile ultrasound technique which includes: sterile gel and probe cover.  A sonographic evaluation of the right upper extremity was performed which demonstrated a patent and compressible basilic vein appropriate for access. The skin and underlying soft tissues were anesthetized with 3cc of 1% lidocaine. Using ultrasound guidance, a micropuncture needle was advanced into the target vessel. A 0.018 guidewire was passed centrally under fluoroscopic guidance followed by a peel-away sheath. The guidewire was used to estimate intraluminal catheter length. Subsequently a double-lumen Bard power PICC line was passed through the sheath and positioned at the cavoatrial junction under fluoroscopic guidance. No ectopy was observed. The catheter was appropriately aspirated and flushed. It was then secured to the skin with a StatLock device. RADIATION: Fluoroscopy time: 0.6 mins. Air Kerma: 20 mGy CONTRAST: None. COMPLICATIONS: None. Technically successful placement of a PICC line with ultrasound access and fluoroscopic guidance. PICC line is ready for immediate use. US RETROPERITONEUM COMP    Result Date: 2/2/2023  EXAM:  US RETROPERITONEUM COMP INDICATION:  ARF COMPARISON: None. TECHNIQUE: Real-time sonography of the kidneys, retroperitoneum and bladder was performed with multiple static images obtained. FINDINGS: The kidneys have diffuse mildly increased echogenicity with no mass, stone or hydronephrosis. A 13 mm cyst is incidentally shown in the lower pole the right kidney and a 9 mm cyst in the interpolar region of the left kidney. The right kidney measures 12.5 cm and the left kidney measures 14.0 cm in length. The aorta and the proximal iliac arteries are obscured by bowel gas and not assessed. The IVC is normal. No retroperitoneal mass is identified. The urinary bladder is normal. Bilateral ureteral jets are documented. Medical renal disease. No evidence for hydronephrosis. CT HIP RT W CONT    Result Date: 2/1/2023  EXAM: CT HIP RT W CONT INDICATION: Right hip swelling, possible abscess. COMPARISON: None TECHNIQUE: Helical CT of the right hip with coronal and sagittal reformats.  Images reviewed in soft tissue and bone windows. CT dose reduction was achieved through the use of a standardized protocol tailored for this examination and automatic exposure control for dose modulation. CONTRAST: Post IV contrast 100 mL Isovue-300 FINDINGS: Bones: Normal bone mineralization. No acute fracture, dislocation, suspicious bone lesion, or osteomyelitis. Hardware: Lumbar spinal hardware and postsurgical findings are partially imaged. Severe right foraminal stenosis at L5-S1. Joint fluid: None. Articulations: Mild right hip osteoarthritis. No evidence of septic arthritis. Tendons: No full-thickness tendon tear. Muscles: Mild atrophy of the right gluteus cheryl muscle. Inflammation involves the right sartorius muscle. Soft tissue mass: No mass. Multiple reactive right inguinal lymph nodes. No drainable fluid collection. Skin thickening and stranding in the subcutaneous adipose tissues involve the right sartorius muscle. Heterogeneous increased attenuation in the fat lateral to the sartorius muscle measures 7.9 x 8.3 x 4.0 cm. Right thigh cellulitis. Ill-defined phlegmon in the proximal, anterior right thigh involves the right sartorius muscle. No drainable abscess at this time. No fracture or osteomyelitis. XR CHEST PORT    Result Date: 2/1/2023  INDICATION:  meets SIRS criteria COMPARISON: None FINDINGS: Single AP portable view of the chest obtained at 1627 demonstrates an enlarged cardiac silhouette. The lungs are hypoinspiratory but clear bilaterally. No osseous abnormalities are seen. Cardiomegaly. No focal pulmonary process. US EXT NONVAS RT LTD    Result Date: 2/3/2023  EXAM: US EXT NONVAS RT LTD INDICATION: collection of fluid/abscess on R thigh COMPARISON: CT right hip with contrast 2/1/2023, right lower extremity venous duplex 2/2/2023.  TECHNIQUE: Targeted ultrasound of the anterior proximal right thigh soft tissues FINDINGS: An approximately 7.9 cm x 7.9 cm x 2.7 cm amorphous mild complex collection in the subcutaneous soft tissues. Surrounding induration and interstitial edema of the subcutaneous fat layer. Mildly complex fluid collection in the subcutaneous soft tissues of the anterior proximal right thigh, with surrounding subcutaneous fat induration and edema. ECHO ADULT COMPLETE    Result Date: 2/2/2023    Left Ventricle: Normal left ventricular systolic function with a visually estimated EF of 55 - 60%. Left ventricle size is normal. Moderately increased wall thickness. Findings consistent with moderate concentric hypertrophy. Normal wall motion. Grade I diastolic dysfunction present with normal LV EF. Left Atrium: Left atrium is mildly dilated. Tricuspid Valve: Unable to assess RVSP due to inadequate or insignificant tricuspid regurgitation. DUPLEX LOWER EXT VENOUS RIGHT    Result Date: 2/2/2023  · No evidence of deep vein thrombosis in the right lower extremity. · No evidence of deep vein thrombosis in the left common femoral vein. No results found for this or any previous visit. Please note that this dictation was completed with [employer], the computer voice recognition software. Quite often unanticipated grammatical, syntax, homophones, and other interpretive errors are inadvertently transcribed by the computer software. Please disregard these errors. Please excuse any errors that have escaped final proofreading.      CC: Betsy Castro MD

## 2023-02-09 NOTE — PROGRESS NOTES
Problem: Diabetes Self-Management  Goal: *Disease process and treatment process  Description: Define diabetes and identify own type of diabetes; list 3 options for treating diabetes. Outcome: Progressing Towards Goal  Goal: *Incorporating nutritional management into lifestyle  Description: Describe effect of type, amount and timing of food on blood glucose; list 3 methods for planning meals. Outcome: Progressing Towards Goal  Goal: *Incorporating physical activity into lifestyle  Description: State effect of exercise on blood glucose levels. Outcome: Progressing Towards Goal  Goal: *Developing strategies to promote health/change behavior  Description: Define the ABC's of diabetes; identify appropriate screenings, schedule and personal plan for screenings. Outcome: Progressing Towards Goal  Goal: *Using medications safely  Description: State effect of diabetes medications on diabetes; name diabetes medication taking, action and side effects. Outcome: Progressing Towards Goal  Goal: *Monitoring blood glucose, interpreting and using results  Description: Identify recommended blood glucose targets  and personal targets. Outcome: Progressing Towards Goal  Goal: *Prevention, detection, treatment of acute complications  Description: List symptoms of hyper- and hypoglycemia; describe how to treat low blood sugar and actions for lowering  high blood glucose level. Outcome: Progressing Towards Goal  Goal: *Prevention, detection and treatment of chronic complications  Description: Define the natural course of diabetes and describe the relationship of blood glucose levels to long term complications of diabetes.   Outcome: Progressing Towards Goal  Goal: *Developing strategies to address psychosocial issues  Description: Describe feelings about living with diabetes; identify support needed and support network  Outcome: Progressing Towards Goal  Goal: *Insulin pump training  Outcome: Progressing Towards Goal  Goal: *Sick day guidelines  Outcome: Progressing Towards Goal  Goal: *Patient Specific Goal (EDIT GOAL, INSERT TEXT)  Outcome: Progressing Towards Goal     Problem: Patient Education: Go to Patient Education Activity  Goal: Patient/Family Education  Outcome: Progressing Towards Goal     Problem: Falls - Risk of  Goal: *Absence of Falls  Description: Document Skmary Jain Fall Risk and appropriate interventions in the flowsheet.   Outcome: Progressing Towards Goal  Note: Fall Risk Interventions:  Mobility Interventions: PT Consult for assist device competence, Strengthening exercises (ROM-active/passive), Utilize walker, cane, or other assistive device         Medication Interventions: Bed/chair exit alarm, Patient to call before getting OOB, Teach patient to arise slowly    Elimination Interventions: Call light in reach    History of Falls Interventions: Bed/chair exit alarm         Problem: Patient Education: Go to Patient Education Activity  Goal: Patient/Family Education  Outcome: Progressing Towards Goal     Problem: Patient Education: Go to Patient Education Activity  Goal: Patient/Family Education  Outcome: Progressing Towards Goal     Problem: Patient Education: Go to Patient Education Activity  Goal: Patient/Family Education  Outcome: Progressing Towards Goal

## 2023-02-09 NOTE — PROGRESS NOTES
Problem: Diabetes Self-Management  Goal: *Disease process and treatment process  Description: Define diabetes and identify own type of diabetes; list 3 options for treating diabetes. Outcome: Progressing Towards Goal  Goal: *Incorporating nutritional management into lifestyle  Description: Describe effect of type, amount and timing of food on blood glucose; list 3 methods for planning meals. Outcome: Progressing Towards Goal  Goal: *Incorporating physical activity into lifestyle  Description: State effect of exercise on blood glucose levels. Outcome: Progressing Towards Goal  Goal: *Developing strategies to promote health/change behavior  Description: Define the ABC's of diabetes; identify appropriate screenings, schedule and personal plan for screenings. Outcome: Progressing Towards Goal  Goal: *Using medications safely  Description: State effect of diabetes medications on diabetes; name diabetes medication taking, action and side effects. Outcome: Progressing Towards Goal  Goal: *Monitoring blood glucose, interpreting and using results  Description: Identify recommended blood glucose targets  and personal targets. Outcome: Progressing Towards Goal  Goal: *Prevention, detection, treatment of acute complications  Description: List symptoms of hyper- and hypoglycemia; describe how to treat low blood sugar and actions for lowering  high blood glucose level. Outcome: Progressing Towards Goal  Goal: *Prevention, detection and treatment of chronic complications  Description: Define the natural course of diabetes and describe the relationship of blood glucose levels to long term complications of diabetes.   Outcome: Progressing Towards Goal  Goal: *Developing strategies to address psychosocial issues  Description: Describe feelings about living with diabetes; identify support needed and support network  Outcome: Progressing Towards Goal  Goal: *Insulin pump training  Outcome: Progressing Towards Goal  Goal: *Sick day guidelines  Outcome: Progressing Towards Goal  Goal: *Patient Specific Goal (EDIT GOAL, INSERT TEXT)  Outcome: Progressing Towards Goal     Problem: Patient Education: Go to Patient Education Activity  Goal: Patient/Family Education  Outcome: Progressing Towards Goal     Problem: Falls - Risk of  Goal: *Absence of Falls  Description: Document Douglas Abraham Fall Risk and appropriate interventions in the flowsheet.   Outcome: Progressing Towards Goal  Note: Fall Risk Interventions:  Mobility Interventions: PT Consult for assist device competence, Strengthening exercises (ROM-active/passive), Utilize walker, cane, or other assistive device         Medication Interventions: Bed/chair exit alarm, Patient to call before getting OOB, Teach patient to arise slowly    Elimination Interventions: Call light in reach    History of Falls Interventions: Bed/chair exit alarm         Problem: Patient Education: Go to Patient Education Activity  Goal: Patient/Family Education  Outcome: Progressing Towards Goal     Problem: Patient Education: Go to Patient Education Activity  Goal: Patient/Family Education  Outcome: Progressing Towards Goal     Problem: Patient Education: Go to Patient Education Activity  Goal: Patient/Family Education  Outcome: Progressing Towards Goal

## 2023-02-09 NOTE — PROGRESS NOTES
Comprehensive Nutrition Assessment    Type and Reason for Visit: Initial, RD nutrition re-screen/LOS    Nutrition Recommendations/Plan:   Continue w/ POC     Malnutrition Assessment:  Malnutrition Status:  No malnutrition (02/09/23 1015)      Nutrition Assessment:    Pt with HTN, GERD, sleep apnea. Presented with right thigh wound, redness, swelling. Admitted with Abscess/Cellulitis. S/p incision and drainage right thigh abscess 2/4/23. Limited wt hx within past year: 363lb (1/2020) -9% [id number], 330lb (10/2019), 325lb (4/2018). Good PO %. Nutrition Related Findings:    Labs and meds reviewed- lantus, humalog, protonix. BM 2/8. Wound Type: Surgical incision (abcess)    Current Nutrition Intake & Therapies:  Average Meal Intake: %     ADULT DIET Regular; 4 carb choices (60 gm/meal); Low Fat/Low Chol/High Fiber/2 gm Na    Anthropometric Measures:  Height: 5' 9\" (175.3 cm)  Ideal Body Weight (IBW): 160 lbs (73 kg)  Admission Body Weight: 330 lb (Per H&P)  Current Body Wt:  149.3 kg (329 lb 2.4 oz) (2/8/23), 205.7 % IBW.  Bed scale  Current BMI (kg/m2): 48.6  Usual Body Weight: 164.7 kg (363 lb)  % Weight Change (Calculated): -9.3  Weight Adjustment: No adjustment                 BMI Category: Obese class 3 (BMI 40.0 or greater)    Estimated Daily Nutrient Needs:  Energy Requirements Based On: Kcal/kg (25-30kcal)  Weight Used for Energy Requirements: Ideal  Energy (kcal/day): 4950-4278  Weight Used for Protein Requirements: Current (2g)  Protein (g/day): 146  Method Used for Fluid Requirements: 1 ml/kcal  Fluid (ml/day): 2762-4631    Nutrition Diagnosis:   No nutrition diagnosis at this time     Nutrition Interventions:   Food and/or Nutrient Delivery: Continue current diet  Nutrition Education/Counseling: No recommendations at this time  Coordination of Nutrition Care: Continue to monitor while inpatient, Interdisciplinary rounds       Goals:     Goals: PO intake 75% or greater, by next RD assessment Nutrition Monitoring and Evaluation:   Behavioral-Environmental Outcomes: None identified  Food/Nutrient Intake Outcomes: Food and nutrient intake  Physical Signs/Symptoms Outcomes: Biochemical data, Meal time behavior, Nutrition focused physical findings, Skin, Weight    Discharge Planning:    Continue current diet    Natasha Castro RD

## 2023-02-09 NOTE — PROGRESS NOTES
0: Plan to discharge home this afternoon with family, instructions reviewed, questions answered, wheelchair transport to main entrance provided.

## 2023-02-09 NOTE — ROUTINE PROCESS
Bedside and Verbal shift change report given to TED Hendricks RN (oncoming nurse) by Jory Cristina RN (offgoing nurse). Report included the following information SBAR and Kardex.

## 2023-02-09 NOTE — PROGRESS NOTES
[facility]  (A Division of [address] [place] [address])                                                           Date of Admission: 2/1/2023   Date of Note: 2/9/2023  Reason for Consult: cellulitis- right leg  Referring MD: Dr Valentino Shields    Current Antimicrobials:    Prior Antimicrobials:    Ceftriaxone 2/3 to date   Clindamycin PO PTA  Ceftriaxone 1 gm IV X1 --2/1/23  Zosyn 2/2 to 2/3  Vancomycin 2/1 to 2/6   Allergy to antibiotics: None     Assessment:     Rash on R thigh- suspect contact dermatitis --better  Severe sepsis- due to below: Improved  Right proximal thigh SSI/Cellulitis with deep multi-loculated abscess due to S.pyogenes  --FU US with 7.9cm X 7.9cm by 2.7 cm   S/P I and D of right thigh abscess 2/4/23  --Per op note: \". .abscess started 2 cm from skin, expressed about 100cc of pus, extended all the way to myofascial compartment but did not appear to go beyond it. [facility] \"  --OR culture 2/4/23--GPC in pair on GS, no aerobic/anaerobic growth so far  --MRSA screen negative  WCX 2/1-- heavy Gp A strep, ligh CoNS  Leucocytosis: Declining 12.8K  VIKTOR-- acute  due to above: improved  Pre-diabetes-- On Metformin by history  SHAHRIAR  Morbidly obese- BMI 48  Pul Hypertension-- was on Viagra  History of back surgery- 2016    Recommendation -- ID related:     OR culture-- Negative so far-- GPC on GS  Watch/monitor rash-- some petechae on Left foot  Ceftriaxone 2 gm IV daily X2 weeks from 2/4/23-- End date: 2/17/23  ( OPAT Order placed in [facility]-- if rash becomes diffuse, will need to change it)  Still significant drainage. Will plan for wound vac for home, daily packing until then. FU with me in wound clinic next Thursday on DC  DW wound RN      Subjective:     R groin area/ upper thigh rash-- improved  He denies itching  Headache better today  Drainage from wound site-- signficant. Awaiting packing    Notes/Labs/Cultures and Imaging reports reviewed. HPI:       Renetta Paul is a 46 y.o. male with hypertension, GERD, sleep apnea ,Pul hypertension and Obese. He had a fall 4 weeks ago which happened when he was pushing heavy set equipment. Had small wound with pain over there that later became red and was given Clindamycin by his PCP- > a week ago. It got better initially, but later he noted anterior thigh -right- swelling and pain, had fever/chills and body aches daily for >4-5 days. He came in to ED on 2/2-- found to have leucocytosis, briefly hypotensive, no tachycardia or hypoxia. CT showed R thigh cellulitis with ill defined phlegmon -- no drain able abscess. No fracture of OM. C/O of headaches, right thigh pain, but no N/V/Diarrhea/SOB/ abdominal pain. Voiding urine without any issue. Currently on Vancomycin and zosyn. He is unemployed due to back pain.  He used to work in construction    Active Hospital Problems    Diagnosis Date Noted    HTN (hypertension) 02/08/2023    Abscess of right thigh 02/03/2023    Controlled type 2 diabetes mellitus, without long-term current use of insulin ([address] 02/02/2023    Lactic acidosis 02/02/2023    Cellulitis 02/01/2023    Sepsis ([address] 02/01/2023    VIKTOR (acute kidney injury) ([address] 02/01/2023    SHAHRIAR (obstructive sleep apnea) 02/01/2023    BMI 45.0-49.9, adult ([address] 02/01/2023     Past Medical History:   Diagnosis Date    Arthritis     spine    Chronic pain     lumbar    GERD (gastroesophageal reflux disease)     Sleep apnea     CPAP in the past, was 300 lbs, lost weight     Past Surgical History:   Procedure Laterality Date    HX ORTHOPAEDIC  1990    right ankle surgery       Medications:  Current Facility-Administered Medications   Medication Dose Route Frequency    ondansetron (ZOFRAN) injection 4 mg  4 mg IntraVENous Q8H PRN    sildenafiL (REVATIO) tablet 100 mg  100 mg Oral BID    heparin (porcine) 100 unit/mL injection 500 Units  500 Units InterCATHeter Q8H PRN    lisinopriL (PRINIVIL, ZESTRIL) tablet 40 mg  40 mg Oral DAILY    hydrALAZINE (APRESOLINE) 20 mg/mL injection 20 mg  20 mg IntraVENous Q6H PRN    verapamiL (CALAN) tablet 120 mg  120 mg Oral BID    buPROPion XL (WELLBUTRIN XL) tablet 300 mg  300 mg Oral DAILY    rosuvastatin (CRESTOR) tablet 40 mg  40 mg Oral QHS    oxyCODONE IR (ROXICODONE) tablet 5 mg  5 mg Oral Q4H PRN    HYDROmorphone (DILAUDID) injection 1 mg  1 mg IntraVENous Q4H PRN    insulin glargine (LANTUS) injection 6 Units  6 Units SubCUTAneous DAILY    cefTRIAXone (ROCEPHIN) 2 g in 0.9% sodium chloride (MBP/ADV) 50 mL MBP  2 g IntraVENous Q24H    acetaminophen (TYLENOL) tablet 650 mg  650 mg Oral Q6H PRN    insulin lispro (HUMALOG) injection   SubCUTAneous AC&HS    glucose chewable tablet 16 g  4 Tablet Oral PRN    glucagon (GLUCAGEN) injection 1 mg  1 mg IntraMUSCular PRN    dextrose 10% infusion 0-250 mL  0-250 mL IntraVENous PRN    dorzolamide-timoloL (COSOPT) 22.3-6.8 mg/mL ophthalmic solution 1 Drop  1 Drop Both Eyes BID    sodium chloride (NS) flush 5-10 mL  5-10 mL IntraVENous PRN    heparin (porcine) injection 5,000 Units  5,000 Units SubCUTAneous Q8H    pantoprazole (PROTONIX) tablet 40 mg  40 mg Oral ACB        Review of Systems     Negative Unless BOLDED     General: fevers, chills, myalgias, arthralgias, unexplained weight loss, malaise, fatigue. HEENT:  headaches, recent URI  PUlMONARY:  cough , shortness of breath  Cardiovascular: chest pain         Objective:       Visit Vitals  /85   Pulse 74   Temp 98.4 °F (36.9 °C)   Resp 18   Ht 5' 9\" (1.753 m)   Wt 149.3 kg (329 lb 2.4 oz)   SpO2 100%   BMI 48.61 kg/m²     Temp (24hrs), Av.8 °F (36.6 °C), Min:97.5 °F (36.4 °C), Max:98.4 °F (36.9 °C)      Lines: PICC-Left arm     General:   WD Obese , on RA awake alert and oriented   Skin:   Localized rash on Right upper thigh/groin- resolved  No diffuse rash  Some petechael changes on left foot skin area.    Edematous right proximal thigh/groin area  I and D surgical wound with deep abscess- goes to 6cm--on exam 2/6- wound dressing is soaked on exam today. He is awaiting wound packing     HEENT:  Normocephalic, atraumatic       Lungs:   non-labored       Abdomen:  soft, severely obese, active bowel sounds. Appropriate surgical scars for stated surgeries. Non-tender   Genitourinary:  deferred   Extremities:   no clubbing, cyanosis; no joint effusions or swelling; ; muscle mass appropriate for age   Neurologic:  No gross focal sensory abnormalities;  Cranial nerves intact   Psychiatric:   appropriate and interactive. Labs: Results:   Chemistry Recent Labs     02/09/23  0256 02/08/23  0350   * 175*    138   K 4.0 4.2    104   CO2 27 26   BUN 22* 20*   CREA 1.18 1.09   CA 9.1 8.9   AGAP 6 8   BUCR 19 18      CBC w/Diff No results for input(s): WBC, RBC, HGB, HCT, PLT, GRANS, LYMPH, EOS, HGBEXT, HCTEXT, PLTEXT, HGBEXT, HCTEXT, PLTEXT in the last 72 hours. No results found for: [facility] [place] Lab Results   Component Value Date/Time    Culture result: NO GROWTH 4 DAYS 02/04/2023 02:59 PM    Culture result: NO GROWTH 4 DAYS 02/04/2023 02:59 PM    Culture result: MRSA NOT PRESENT 02/02/2023 02:08 PM    Culture result:  02/02/2023 02:08 PM     Screening of patient nares for MRSA is for surveillance purposes and, if positive, to facilitate isolation considerations in high risk settings. It is not intended for automatic decolonization interventions per se as regimens are not sufficiently effective to warrant routine use. Culture result: (A) 02/01/2023 03:20 PM     HEAVY Streptococci, beta hemolytic group A Penicillin and ampicillin are drugs of choice for treatment of beta-hemolytic streptococcal infections. Susceptibility testing of penicillins and beta-lactams approved by the FDA for treatment of beta-hemolytic streptococcal infections need not be performed routinely, because nonsusceptible isolates are extremely rare.  CLSI 2012      Culture result: FEW STAPHYLOCOCCUS SPECIES, COAGULASE NEGATIVE (A) 02/01/2023 03:20 PM        Results       Procedure Component Value Units Date/Time    CULTURE, ANAEROBIC [[id number]] Collected: 02/04/23 1459    Order Status: Completed Specimen: Surgical Specimen Updated: 02/09/23 0752     Special Requests: NO SPECIAL REQUESTS        Culture result: NO GROWTH 4 DAYS       CULTURE, Lenabda Gregory STAIN [[id number]] Collected: 02/04/23 1459    Order Status: Completed Specimen: Wound from Leg Updated: 02/09/23 0752     Special Requests: NO SPECIAL REQUESTS        GRAM STAIN       OCCASIONAL Gram positive cocci PAIRS            RARE WBCS SEEN        Culture result: NO GROWTH 4 DAYS       CULTURE, MRSA [[id number]] Collected: 02/02/23 1408    Order Status: Completed Specimen: Nasal from Nares Updated: 02/03/23 2349     Special Requests: NO SPECIAL REQUESTS        Culture result: MRSA NOT PRESENT               Screening of patient nares for MRSA is for surveillance purposes and, if positive, to facilitate isolation considerations in high risk settings. It is not intended for automatic decolonization interventions per se as regimens are not sufficiently effective to warrant routine use. CULTURE, BLOOD [[id number]] Collected: 02/02/23 0400    Order Status: Canceled Specimen: Blood     CULTURE, Lenoria Gregory STAIN [[id number]]  (Abnormal) Collected: 02/01/23 1520    Order Status: Completed Specimen: Wound from Abscess Updated: 02/03/23 1320     Special Requests: --        RIGHT  HIP       GRAM STAIN OCCASIONAL WBCS SEEN               2+ Gram positive cocci IN PAIRS           Culture result:       HEAVY Streptococci, beta hemolytic group A Penicillin and ampicillin are drugs of choice for treatment of beta-hemolytic streptococcal infections. Susceptibility testing of penicillins and beta-lactams approved by the FDA for treatment of beta-hemolytic streptococcal infections need not be performed routinely, because nonsusceptible isolates are extremely rare.  CLSI 2012              FEW STAPHYLOCOCCUS SPECIES, COAGULASE NEGATIVE          CULTURE, BLOOD [[id number]] Collected: 02/01/23 1500    Order Status: Completed Specimen: Blood Updated: 02/07/23 0724     Special Requests: NO SPECIAL REQUESTS        Culture result: NO GROWTH 6 DAYS                 Imaging: All imaging reviewed from Admission to present as per radiology interpretation in [address] [place] [address]  Filiberto Hallman MD  [facility])  Office #:     [phone]  [phone]   Office Fax: [phone]

## 2023-02-09 NOTE — PROGRESS NOTES
Chart reviewed for assistance d/c planning, noted [facility] orders placed for ID recommendation and for home health services.

## 2023-02-16 ENCOUNTER — HOSPITAL ENCOUNTER (OUTPATIENT)
Facility: HOSPITAL | Age: 53
Discharge: HOME | End: 2023-02-16
Payer: COMMERCIAL

## 2023-02-16 VITALS
TEMPERATURE: 98.6 F | DIASTOLIC BLOOD PRESSURE: 68 MMHG | OXYGEN SATURATION: 100 % | SYSTOLIC BLOOD PRESSURE: 116 MMHG | RESPIRATION RATE: 18 BRPM | HEART RATE: 55 BPM

## 2023-02-16 DIAGNOSIS — S71.101D OPEN WOUND OF RIGHT THIGH, SUBSEQUENT ENCOUNTER: Primary | ICD-10-CM

## 2023-02-16 PROBLEM — L02.415 ABSCESS OF RIGHT THIGH: Status: ACTIVE | Noted: 2023-02-03

## 2023-02-16 PROBLEM — S71.101A OPEN WOUND OF RIGHT THIGH: Status: ACTIVE | Noted: 2023-02-16

## 2023-02-16 PROCEDURE — 99202 OFFICE O/P NEW SF 15 MIN: CPT

## 2023-02-16 RX ORDER — CLOBETASOL PROPIONATE 0.5 MG/G
OINTMENT TOPICAL ONCE
Status: CANCELLED | OUTPATIENT
Start: 2023-02-16 | End: 2023-02-16

## 2023-02-16 RX ORDER — CLOBETASOL PROPIONATE 0.5 MG/G
OINTMENT TOPICAL ONCE
OUTPATIENT
Start: 2023-02-16 | End: 2023-02-16

## 2023-02-16 RX ORDER — GINSENG 100 MG
CAPSULE ORAL ONCE
Status: CANCELLED | OUTPATIENT
Start: 2023-02-16 | End: 2023-02-16

## 2023-02-16 RX ORDER — CEFUROXIME AXETIL 500 MG/1
500 TABLET ORAL 2 TIMES DAILY
Qty: 20 TABLET | Refills: 1 | Status: SHIPPED | OUTPATIENT
Start: 2023-02-16 | End: 2023-02-26

## 2023-02-16 RX ORDER — BETAMETHASONE DIPROPIONATE 0.05 %
OINTMENT (GRAM) TOPICAL ONCE
OUTPATIENT
Start: 2023-02-16 | End: 2023-02-16

## 2023-02-16 RX ORDER — GINSENG 100 MG
CAPSULE ORAL ONCE
OUTPATIENT
Start: 2023-02-16 | End: 2023-02-16

## 2023-02-16 RX ORDER — GENTAMICIN SULFATE 1 MG/G
OINTMENT TOPICAL ONCE
Status: CANCELLED | OUTPATIENT
Start: 2023-02-16 | End: 2023-02-16

## 2023-02-16 RX ORDER — BETAMETHASONE DIPROPIONATE 0.05 %
OINTMENT (GRAM) TOPICAL ONCE
Status: CANCELLED | OUTPATIENT
Start: 2023-02-16 | End: 2023-02-16

## 2023-02-16 RX ORDER — BACITRACIN ZINC AND POLYMYXIN B SULFATE 500; 1000 [USP'U]/G; [USP'U]/G
OINTMENT TOPICAL ONCE
OUTPATIENT
Start: 2023-02-16 | End: 2023-02-16

## 2023-02-16 RX ORDER — BACITRACIN, NEOMYCIN, POLYMYXIN B 400; 3.5; 5 [USP'U]/G; MG/G; [USP'U]/G
OINTMENT TOPICAL ONCE
Status: CANCELLED | OUTPATIENT
Start: 2023-02-16 | End: 2023-02-16

## 2023-02-16 RX ORDER — GENTAMICIN SULFATE 1 MG/G
OINTMENT TOPICAL ONCE
OUTPATIENT
Start: 2023-02-16 | End: 2023-02-16

## 2023-02-16 RX ORDER — BACITRACIN, NEOMYCIN, POLYMYXIN B 400; 3.5; 5 [USP'U]/G; MG/G; [USP'U]/G
OINTMENT TOPICAL ONCE
OUTPATIENT
Start: 2023-02-16 | End: 2023-02-16

## 2023-02-16 RX ORDER — BACITRACIN ZINC AND POLYMYXIN B SULFATE 500; 1000 [USP'U]/G; [USP'U]/G
OINTMENT TOPICAL ONCE
Status: CANCELLED | OUTPATIENT
Start: 2023-02-16 | End: 2023-02-16

## 2023-02-16 ASSESSMENT — PAIN SCALES - GENERAL: PAINLEVEL_OUTOF10: 2

## 2023-02-16 NOTE — DISCHARGE INSTRUCTIONS
Discharge Instructions from  [address] [facility]  [address], [facility]  [phone] Fax [phone]    NAME:  Terrea Meckel  YOB: 1970  MEDICAL RECORD NUMBER:  [id number]  DATE:  @ED@    Wound Cleansing:   Do not scrub or use excessive force. Cleanse wound prior to applying a clean dressing with:  [] Normal Saline [] Keep Wound Dry in Shower    [x] Wound Cleanser   [] Cleanse wound with Mild Soap & Water  [] May Shower at Discharge   [] Other:      Topical Treatments:  Do not apply lotions, creams, or ointments to wound bed unless directed. [] Apply moisturizing lotion to skin surrounding the wound prior to dressing change.  [] Apply antifungal ointment to skin surrounding the wound prior to dressing change. [x] Apply thin film of moisture barrier ointment to skin immediately around wound. [] Other:       Dressings:           Wound Location Right Hip   [x] Apply Primary Dressing:       [] MediHoney Gel [x] Alginate with Silver [] Alginate   [] Collagen [] Collagen with Silver   [] Santyl with Moisten saline gauze     [] Hydrocolloid   [] MediHoney Alginate [] Foam with Silver   [] Foam   [] Hydrofera Blue    [] Mepilex Border    [] Moisten with Saline [] Hydrogel [] Mepitel     [] Bactroban/Mupirocin [] Polysporin  [] Other:    [x] Pack wound loosely with  [x] Iodoform   [] Plain Packing  [] Other   [x] Cover and Secure with:     [] Gauze [] Kera [] Kerlix   [] Ace Wrap [x] Cover Roll Tape [x] ABD     [x] Other: Mepitel one   Avoid contact of tape with skin.   [x] Change dressing: [] Daily    [] Every Other Day [x] Three times per week   [] Once a week [] Do Not Change Dressing   [] Other:       Dietary:  [x] Diet as tolerated: [] Calorie Diabetic Diet: [] No Added Salt:  [x] Increase Protein: [] Other:   Activity:  [x] Activity as tolerated:  [x] Patient has no activity restrictions     [] Strict Bedrest: [] Remain off Work:     [] May return to full duty work:                                   [] Return to work with restrictions:             Return Appointment:  [x] Wound and dressing supply provider:   [x] ECF or Home Healthcare:  [x] Wound Assessment: [x] Physician or NP scheduled for Wound Assessment:   [x] Return Appointment: 1 Week(s)  [] Ordered tests:      Electronically signed Fidel Rivera LPN on 7/81/7346 at [address] [place] [address]: Should you experience any significant changes in your wound(s) or have questions about your wound care, please contact the [facility] Main at [address] [place] [address] 8:00 am - 4:30. If you need help with your wound outside these hours and cannot wait until we are again available, contact your PCP or go to the hospital emergency room. PLEASE NOTE: IF YOU ARE UNABLE TO OBTAIN WOUND SUPPLIES, CONTINUE TO USE THE SUPPLIES YOU HAVE AVAILABLE UNTIL YOU ARE ABLE TO REACH US. IT IS MOST IMPORTANT TO KEEP THE WOUND COVERED AT ALL TIMES.      Physician Signature:_______________________    Date: ___________ Time:  ____________

## 2023-02-16 NOTE — FLOWSHEET NOTE
02/16/23 1532   Wound 02/03/23 Thigh Anterior;Right   Date First Assessed/Time First Assessed: 02/03/23 1130   Primary Wound Type: Surgical Type  Location: Thigh  Wound Location Orientation: Anterior;Right   Wound Image    Wound Etiology Non-Healing Surgical   Dressing Status New dressing applied;New drainage noted; Old drainage noted   Wound Cleansed Cleansed with saline   Dressing/Treatment ABD; Alginate with Ag;Packing; Iodoform gauze;Tape change; Other (comment)  (Mepitel one)   Offloading for Diabetic Foot Ulcers Offloading not required   Dressing Change Due 02/23/23   Wound Length (cm) 2.5 cm   Wound Width (cm) 2.5 cm   Wound Depth (cm) 2 cm   Wound Surface Area (cm^2) 6.25 cm^2   Wound Volume (cm^3) 12.5 cm^3   Distance Tunneling (cm) 3.8 cm   Tunneling Position ___ O'Clock 12   Wound Assessment Erythema   Drainage Amount Small   Drainage Description Serosanguinous   Odor None   Eileen-wound Assessment Blanchable erythema   Margins Defined edges;Epibole (rolled edges)   Wound Thickness Description not for Pressure Injury Full thickness   Wound Follow Up   Require Follow Up Yes

## 2023-02-23 ENCOUNTER — HOSPITAL ENCOUNTER (OUTPATIENT)
Facility: HOSPITAL | Age: 53
Discharge: HOME | End: 2023-02-23
Payer: COMMERCIAL

## 2023-02-23 VITALS
HEART RATE: 58 BPM | DIASTOLIC BLOOD PRESSURE: 70 MMHG | RESPIRATION RATE: 18 BRPM | SYSTOLIC BLOOD PRESSURE: 116 MMHG | TEMPERATURE: 99 F

## 2023-02-23 DIAGNOSIS — S71.101D OPEN WOUND OF RIGHT THIGH, SUBSEQUENT ENCOUNTER: Primary | ICD-10-CM

## 2023-02-23 PROCEDURE — 97597 DBRDMT OPN WND 1ST 20 CM/<: CPT

## 2023-02-23 RX ORDER — BETAMETHASONE DIPROPIONATE 0.05 %
OINTMENT (GRAM) TOPICAL ONCE
OUTPATIENT
Start: 2023-02-23 | End: 2023-02-23

## 2023-02-23 RX ORDER — GENTAMICIN SULFATE 1 MG/G
OINTMENT TOPICAL ONCE
OUTPATIENT
Start: 2023-02-23 | End: 2023-02-23

## 2023-02-23 RX ORDER — CLOBETASOL PROPIONATE 0.5 MG/G
OINTMENT TOPICAL ONCE
OUTPATIENT
Start: 2023-02-23 | End: 2023-02-23

## 2023-02-23 RX ORDER — BACITRACIN ZINC AND POLYMYXIN B SULFATE 500; 1000 [USP'U]/G; [USP'U]/G
OINTMENT TOPICAL ONCE
OUTPATIENT
Start: 2023-02-23 | End: 2023-02-23

## 2023-02-23 RX ORDER — GINSENG 100 MG
CAPSULE ORAL ONCE
OUTPATIENT
Start: 2023-02-23 | End: 2023-02-23

## 2023-02-23 RX ORDER — BACITRACIN, NEOMYCIN, POLYMYXIN B 400; 3.5; 5 [USP'U]/G; MG/G; [USP'U]/G
OINTMENT TOPICAL ONCE
OUTPATIENT
Start: 2023-02-23 | End: 2023-02-23

## 2023-02-23 ASSESSMENT — PAIN SCALES - GENERAL: PAINLEVEL_OUTOF10: 0

## 2023-02-23 NOTE — WOUND CARE
Negative Pressure Wound Therapy    NAME:  Dedrick Gardner  YOB: 1970  MEDICAL RECORD NUMBER:  [id number]  DATE:  2/23/2023    Applied Negative Pressure to Right Hip wound(s)/ulcer(s). [x] Applied skin barrier prep to felicita-wound. [x] Cut strips of plastic drape to picture frame wound so that felicita-wound is     covered with the drape. [x] If bridging dressing to less prominent site, cover any intact skin that will come in contact with the Negative Pressure Therapy sponge, gauze or channel drain with plastic drape. The sponge should never touch intact skin. [x] Cut sponge, gauze or channel drain to size which will fit into the wound/ulcer bed without being forced. [x] Be sure the sponge is large enough to hold the entire round plastic flange which is attached to the tubing. Never allow flange to be larger than the sponge or it will produce suction damaging intact skin. Total number of individual pieces of foam used within the wound bed: 3    [x] If bridging the dressing away from the primary site, be sure the bridge leads to a piece of sponge large enough to hold the entire flange without allowing any of the flange to overlap onto intact skin. [x] Covered sponge, gauze or channel drain with plastic drape. [x] Cut a hole in this plastic drape directly over the sponge the same size as the plastic drain tubing. [x] Removed plastic liner from flange and apply it directly over the hole you cut. [x] Removed the plastic cover from the flange. [x] Attached the tubing to the wound/ulcer Negative Pressure Therapy and turn it on to be sure a vacuum is created and that there are no leaks. [x] If air leaks occur, use plastic drape to patch them. [x] Secured Negative Pressure Therapy dressing with ace wrap loosely if located on an extremity. Maintain tubing outside of ace wrap. Tubing must not exert pressure on intact skin.     Applied per  Guidelines      Electronically signed by [employer] EDUARDO MOELLER on 9/51/9513 at 6:79 PM

## 2023-02-23 NOTE — WOUND CARE
Discharge Instructions from  [facility]  [address] [place] [place], [facility]  [phone] Fax [phone]    NAME:  Glen Martinez  YOB: 1970  MEDICAL RECORD NUMBER:  [id number]  DATE:  2/23/2023    Wound Cleansing:   Do not scrub or use excessive force. Cleanse wound prior to applying a clean dressing with:  [x] Normal Saline [x] Keep Wound Dry in Shower         Topical Treatments:  Do not apply lotions, creams, or ointments to wound bed unless directed. [] Apply moisturizing lotion to skin surrounding the wound prior to dressing change.  [] Apply antifungal ointment to skin surrounding the wound prior to dressing change. [x] Apply thin film of moisture barrier ointment to skin immediately around wound. [x] Other:  Skin prep, LIDOCAINE GEL TO WOUND BED PRIOR TO MEASURING, AND APPLICATION OF HYDROFERA BLUE ROPE. Dressings:           Wound Location Right Hip   [x] Apply Primary Dressing:     [x] Foam for offloading tubing  [x]  Pack wound tunnels loosely with Hydrofera Blue rope  Moisten with saline. [x] Mepitel one . Patient has a tunnel at 11:o clock and 2:o clock   [x] Cover and Secure with: [x] Other: Mepitel one   Avoid contact of tape with skin.      Negative Pressure:           Wound Location:   [x] Pressure@    125       mm/Hg  [x]Continuous    [x] Black   [x] Other: Hydrofera blue rope  [x]Change dressing: [x]Three times per week        Dietary:  [x] Diet as tolerated: [] Calorie Diabetic Diet: [] No Added Salt:  [x] Increase Protein: [] Other:   Activity:  [x] Activity as tolerated:  [x] Patient has no activity restrictions    work:                                              Return Appointment:  [x] Wound and dressing supply provider:   [x] ECF or Home Healthcare:  [x] Wound Assessment: [x] Physician or NP scheduled for Wound Assessment:   [x] Return Appointment: 1 Week(s)  [] Ordered tests:      Electronically signed [employer] EDUARDO MOELLER on 1/06/2633 at 8:21 PM   Verbal orders per Dr. Taj Muhammad [facility]: Should you experience any significant changes in your wound(s) or have questions about your wound care, please contact the [facility] at [address] [place] [address] 8:00 am - 4:30. If you need help with your wound outside these hours and cannot wait until we are again available, contact your PCP or go to the hospital emergency room. PLEASE NOTE: IF YOU ARE UNABLE TO OBTAIN WOUND SUPPLIES, CONTINUE TO USE THE SUPPLIES YOU HAVE AVAILABLE UNTIL YOU ARE ABLE TO REACH US. IT IS MOST IMPORTANT TO KEEP THE WOUND COVERED AT ALL TIMES.      Physician Signature:_______________________    Date: ___________ Time:  ____________

## 2023-02-24 NOTE — DISCHARGE INSTRUCTIONS
Discharge Instructions from  [facility]  [address] [place] [place], [facility]  [phone] Fax [phone]    NAME:  Anais Jesus  YOB: 1970  MEDICAL RECORD NUMBER:  [id number]  DATE:  @ED@    Wound Cleansing:   Do not scrub or use excessive force. Cleanse wound prior to applying a clean dressing with:  [x] Normal Saline [] Keep Wound Dry in Shower    [x] Wound Cleanser   [] Cleanse wound with Mild Soap & Water  [] May Shower at Discharge   [] Other:      Topical Treatments:  Do not apply lotions, creams, or ointments to wound bed unless directed. [] Apply moisturizing lotion to skin surrounding the wound prior to dressing change. [x] Apply antifungal ointment to skin surrounding the wound prior to dressing change. [x] Apply thin film of moisture barrier ointment to skin immediately around wound.   [] Other:            Negative Pressure:           Wound Location: Right Hip  [x] Pressure@   125        mm/Hg  [x]Continuous []Intermittent   [x] Black  [] White Foam [x] Other: HYDROFERA BLUE ROPE  [x]Change dressing: [x]Three times per week    []Other:         Dietary:  [x] Diet as tolerated: [] Calorie Diabetic Diet: [] No Added Salt:  [x] Increase Protein: [] Other:   Activity:  [x] Activity as tolerated:  [x] Patient has no activity restrictions     [] Strict Bedrest: [] Remain off Work:     [] May return to full duty work:                                   [] Return to work with restrictions:             Return Appointment:  [x] Wound and dressing supply provider:   [x] ECF or Home Healthcare:  [x] Wound Assessment: [] Physician or NP scheduled for Wound Assessment:   [x] Return Appointment: 1 Week(s)  [] Ordered tests:      Electronically signed Kyle Hensley LPN on 1/89/6515 at [address]: Should you experience any significant changes in your wound(s) or have questions about your wound care, please contact the [address] [place] [address] at [address] [place] [address] 8:00 am - 4:30. If you need help with your wound outside these hours and cannot wait until we are again available, contact your PCP or go to the hospital emergency room. PLEASE NOTE: IF YOU ARE UNABLE TO OBTAIN WOUND SUPPLIES, CONTINUE TO USE THE SUPPLIES YOU HAVE AVAILABLE UNTIL YOU ARE ABLE TO REACH US. IT IS MOST IMPORTANT TO KEEP THE WOUND COVERED AT ALL TIMES.      Physician Signature:_______________________    Date: ___________ Time:  ____________

## 2023-02-24 NOTE — WOUND CARE
[facility]   History and Physical Note     Referring Provider: Post hospital DC--inpatient consult Dr Preston Farmer  Reason for Referral: Post hospital discharged, wound care    Soraida Gardner  MEDICAL RECORD NUMBER:  [id number]  AGE: 46 y.o. GENDER: male  : 1970  EPISODE DATE:  2023    Chief complaint and reason for visit:     FU wound check        HISTORY of PRESENT ILLNESS HPI     Glen Martinez is a 46 y.o. male with PMH of HTN, Morbid Obesity, GERD, Pul hypertension, with admission to THE [facility] from  to 23. He was admitted with sepsis and had right upper thigh cellulitis and abscess. He was treated with IV antibiotics and on 23 underwent I and D of deep abscess. OR culture remain negative. He was treated with IV ceftriaxone for 2 weeks and is here for wound care of his thigh. Wound duration: since (date) 23 . History of Wound Context: Surgical-- I and D  Pertinent associated symptoms: pain severity: intermittent, moderate    Today;s visit 23:     Reports r thigh pain chanel worse during wound care. Wound vac was delivered to his home, but RN unable to place it well due to pain during procedure. Outside of procedure, the pain is much less. PICC line removed and he is on oral cefuroxime PO BID  No fever or chills  Denies nausea or vomiting or abd pain or diarrhea  Denies chest pain, productive cough  Denies new joint pian or swelling  Denies dysuria/Flank pain/ hematuria  Denies rash/ itching     PAST MEDICAL HISTORY        Diagnosis Date    Arthritis     spine    Chronic pain     lumbar    GERD (gastroesophageal reflux disease)     Sleep apnea     CPAP in the past, was 300 lbs, lost weight       PAST SURGICAL HISTORY  Past Surgical History:   Procedure Laterality Date    ORTHOPEDIC SURGERY      right ankle surgery       FAMILY HISTORY  No family history on file.     SOCIAL HISTORY  Social History     Tobacco Use    Smoking status: Never Smokeless tobacco: Never   Substance Use Topics    Alcohol use: Yes     Alcohol/week: 5.0 standard drinks    Drug use: No       ALLERGIES  Not on File    MEDICATIONS  Current Outpatient Medications   Medication Sig Dispense Refill    [START ON 2/24/2023] Lidocaine 2 % GEL Apply 10 Syringes topically three times a week for 15 days 15 each 1    cefUROXime (CEFTIN) 500 MG tablet Take 1 tablet by mouth 2 times daily for 10 days Start PO antibiotics after IV antibiotics is completed 20 tablet 1     No current facility-administered medications for this encounter. Objective:      /70   Pulse 58   Temp 99 °F (37.2 °C) (Oral)   Resp 18        GEN: WD Morbidly obese, not in resp distress. HEENT: Unicteric. EOMI intact  CHEST: Non laboured breathing  LUCY: Deferred  EXT: No apparent swelling or redness on UE/LE joints. CNS: A, OX3. Moves all extremity. CN grossly ok. Skin: see RN note Saran Brantley for wound mesurement    Assessment:     R thigh deep wound --post surgical  GBS infection on IV ceftriaxone-> Cefuroxime PO BID  Bleeding on PICC site- Left--PICC is removed 2/20/23  R thigh pain issue-- pain management with primary MD- patient advised to contact.      Plan:     Dressing to wound- alginate with Ag, packing, wound vac orders placed  No I and D  today  Order to [facility] for Lidocaine 2% gel for Chacorta Kam RN at home  FU in 1 week      Josr Rincon MD on 2/23/2023

## 2023-02-24 NOTE — FLOWSHEET NOTE
02/23/23 1001   Anesthetic   Anesthetic 2% Lidocaine Gel Topical   Wound 02/03/23 Thigh Anterior;Right   Date First Assessed/Time First Assessed: 02/03/23 1130   Primary Wound Type: Surgical Type  Location: Thigh  Wound Location Orientation: Anterior;Right   Wound Image    Wound Etiology Non-Healing Surgical   Dressing Status New dressing applied; Intact; New drainage noted; Old drainage noted   Wound Cleansed Cleansed with saline   Dressing/Treatment Hydrofera blue;Negative pressure wound therapy  (hydrofera blue rope)   Dressing Change Due 03/02/23   Wound Length (cm) 1.8 cm   Wound Width (cm) 2.5 cm   Wound Depth (cm) 1.7 cm   Wound Surface Area (cm^2) 4.5 cm^2   Change in Wound Size % (l*w) 28   Wound Volume (cm^3) 7.65 cm^3   Wound Healing % 39   Distance Tunneling (cm) 2.5 cm  (patient has another tunnel 2 oclock @ 5.5cm)   Tunneling Position ___ O'Clock 11   Wound Assessment Erythema;Pink/red   Drainage Amount Copious   Drainage Description Serosanguinous   Odor None   Eileen-wound Assessment Blanchable erythema   Margins Defined edges;Epibole (rolled edges)   Wound Thickness Description not for Pressure Injury Full thickness   Pain Assessment   Pain Assessment 0-10   Pain Level 0   Wound Follow Up   Require Follow Up Yes

## 2023-03-02 ENCOUNTER — HOSPITAL ENCOUNTER (OUTPATIENT)
Facility: HOSPITAL | Age: 53
Discharge: HOME | End: 2023-03-02
Payer: COMMERCIAL

## 2023-03-02 VITALS
RESPIRATION RATE: 16 BRPM | SYSTOLIC BLOOD PRESSURE: 126 MMHG | HEART RATE: 51 BPM | OXYGEN SATURATION: 100 % | TEMPERATURE: 98.8 F | DIASTOLIC BLOOD PRESSURE: 82 MMHG

## 2023-03-02 DIAGNOSIS — S71.101D OPEN WOUND OF RIGHT THIGH, SUBSEQUENT ENCOUNTER: Primary | ICD-10-CM

## 2023-03-02 PROCEDURE — 97605 NEG PRS WND THER DME<=50SQCM: CPT

## 2023-03-02 RX ORDER — BACITRACIN, NEOMYCIN, POLYMYXIN B 400; 3.5; 5 [USP'U]/G; MG/G; [USP'U]/G
OINTMENT TOPICAL ONCE
OUTPATIENT
Start: 2023-03-02 | End: 2023-03-02

## 2023-03-02 RX ORDER — BACITRACIN ZINC AND POLYMYXIN B SULFATE 500; 1000 [USP'U]/G; [USP'U]/G
OINTMENT TOPICAL ONCE
OUTPATIENT
Start: 2023-03-02 | End: 2023-03-02

## 2023-03-02 RX ORDER — BETAMETHASONE DIPROPIONATE 0.05 %
OINTMENT (GRAM) TOPICAL ONCE
OUTPATIENT
Start: 2023-03-02 | End: 2023-03-02

## 2023-03-02 RX ORDER — GINSENG 100 MG
CAPSULE ORAL ONCE
OUTPATIENT
Start: 2023-03-02 | End: 2023-03-02

## 2023-03-02 RX ORDER — GENTAMICIN SULFATE 1 MG/G
OINTMENT TOPICAL ONCE
OUTPATIENT
Start: 2023-03-02 | End: 2023-03-02

## 2023-03-02 RX ORDER — CLOBETASOL PROPIONATE 0.5 MG/G
OINTMENT TOPICAL ONCE
OUTPATIENT
Start: 2023-03-02 | End: 2023-03-02

## 2023-03-02 NOTE — WOUND CARE
[address] [facility]     History and Physical Note     Referring Provider: Post hospital DC--inpatient consult Dr Harvest Ormond  Reason for Referral: Post hospital discharged, wound care    Mayo Gardner  MEDICAL RECORD NUMBER:  [id number]  AGE: 46 y.o. GENDER: male  : 1970  EPISODE DATE:  3/2/2023    Chief complaint and reason for visit:     FU wound check        HISTORY of PRESENT ILLNESS HPI     Maegan Gan is a 46 y.o. male with PMH of HTN, Morbid Obesity, GERD, Pul hypertension, with admission to [facility] from  to 23. He was admitted with sepsis and had right upper thigh cellulitis and abscess. He was treated with IV antibiotics and on 23 underwent I and D of deep abscess. OR culture remain negative. He was treated with IV ceftriaxone for 2 weeks and is here for wound care of his thigh. Wound duration: since (date) 23 . History of Wound Context: Surgical-- I and D  Pertinent associated symptoms: pain severity: intermittent, moderate    Today;s visit 23:     Reports r thigh pain chanel worse during wound care. Wound vac was delivered to his home, but RN unable to place it well due to pain during procedure. Outside of procedure, the pain is much less. PICC line removed and he is on oral cefuroxime PO BID  No fever or chills  Denies nausea or vomiting or abd pain or diarrhea  Denies chest pain, productive cough  Denies new joint pian or swelling  Denies dysuria/Flank pain/ hematuria  Denies rash/ itching     PAST MEDICAL HISTORY        Diagnosis Date    Arthritis     spine    Chronic pain     lumbar    GERD (gastroesophageal reflux disease)     Sleep apnea     CPAP in the past, was 300 lbs, lost weight       PAST SURGICAL HISTORY  Past Surgical History:   Procedure Laterality Date    ORTHOPEDIC SURGERY      right ankle surgery       FAMILY HISTORY  No family history on file.     SOCIAL HISTORY  Social History     Tobacco Use    Smoking status: Never Smokeless tobacco: Never   Substance Use Topics    Alcohol use: Yes     Alcohol/week: 5.0 standard drinks    Drug use: No       ALLERGIES  Not on File    MEDICATIONS  Current Outpatient Medications   Medication Sig Dispense Refill    Lidocaine 2 % GEL Apply 10 Syringes topically three times a week for 15 days 15 each 1     No current facility-administered medications for this encounter. Objective:      /82   Pulse 51   Temp 98.8 °F (37.1 °C) (Oral)   Resp 16   SpO2 100%        GEN: WD Morbidly obese, not in resp distress. HEENT: Unicteric. EOMI intact  CHEST: Non laboured breathing  LUCY: Deferred  EXT: No apparent swelling or redness on UE/LE joints. CNS: A, OX3. Moves all extremity. CN grossly ok. Skin: see RN note Sarah Payton for wound mesurement    Assessment:     R thigh deep wound --post surgical  GBS infection on IV ceftriaxone-> Cefuroxime PO BID  Bleeding on PICC site- Left--PICC is removed 2/20/23  R thigh pain issue-- pain management with primary MD- patient advised to contact.      Plan:     Dressing to wound- alginate with Ag, packing, wound vac orders placed  No I and D  today  Order to [facility] for Lidocaine 2% gel for Chacorta Kam RN at home  FU in 1 week      Aileen Tiwari MD on 3/2/2023

## 2023-03-02 NOTE — FLOWSHEET NOTE
03/02/23 1121   Anesthetic   Anesthetic 2% Lidocaine Gel Topical   Wound 02/03/23 Thigh Anterior;Right   Date First Assessed/Time First Assessed: 02/03/23 1130   Primary Wound Type: Surgical Type  Location: Thigh  Wound Location Orientation: Anterior;Right   Wound Image    Wound Etiology Non-Healing Surgical   Dressing Status New dressing applied;New drainage noted   Wound Cleansed Wound cleanser   Dressing/Treatment Collagen with Ag;Hydrofera blue;Negative pressure wound therapy   Dressing Change Due 03/09/23   Wound Length (cm) 2 cm   Wound Width (cm) 2.5 cm   Wound Depth (cm) 1.5 cm   Wound Surface Area (cm^2) 5 cm^2   Change in Wound Size % (l*w) 20   Wound Volume (cm^3) 7.5 cm^3   Wound Healing % 40   Distance Tunneling (cm) 5.5 cm  (Tunnel @ 2  0 clock 2.5)   Tunneling Position ___ O'Clock 11   Wound Assessment Epithelialization;Pink/red   Drainage Amount Moderate   Drainage Description Serosanguinous   Odor None   Eileen-wound Assessment Blanchable erythema   Margins Defined edges   Wound Thickness Description not for Pressure Injury Full thickness   Pain Assessment   Pain Assessment 0-10   Patient's Stated Pain Goal 0 - No pain

## 2023-03-02 NOTE — WOUND CARE
Negative Pressure Wound Therapy    NAME:  Stephanie Gardner  YOB: 1970  MEDICAL RECORD NUMBER:  [id number]  DATE:  3/2/2023    Applied Negative Pressure to Right Hip wound(s)/ulcer(s). [x] Applied skin barrier prep to felicita-wound. [x] Cut strips of plastic drape to picture frame wound so that felicita-wound is     covered with the drape. [x] If bridging dressing to less prominent site, cover any intact skin that will come in contact with the Negative Pressure Therapy sponge, gauze or channel drain with plastic drape. The sponge should never touch intact skin. [x] Cut sponge, gauze or channel drain to size which will fit into the wound/ulcer bed without being forced. [x] Be sure the sponge is large enough to hold the entire round plastic flange which is attached to the tubing. Never allow flange to be larger than the sponge or it will produce suction damaging intact skin. Total number of individual pieces of foam used within the wound bed: 3  Applied Hydrofera blue in tunnels and black foam over that. [x] If bridging the dressing away from the primary site, be sure the bridge leads to a piece of sponge large enough to hold the entire flange without allowing any of the flange to overlap onto intact skin. [x] Covered sponge, gauze or channel drain with plastic drape. [x] Cut a hole in this plastic drape directly over the sponge the same size as the plastic drain tubing. [x] Removed plastic liner from flange and apply it directly over the hole you cut. [x] Removed the plastic cover from the flange. [x] Attached the tubing to the wound/ulcer Negative Pressure Therapy and turn it on to be sure a vacuum is created and that there are no leaks. [x] If air leaks occur, use plastic drape to patch them. [x] Secured Negative Pressure Therapy dressing with ace wrap loosely if located on an extremity. Maintain tubing outside of ace wrap. Tubing must not exert pressure on intact skin.     Applied per  Guidelines      Electronically signed by Leah Cooley LPN on 6/4/6150 at 0:64 PM

## 2023-03-03 NOTE — DISCHARGE INSTRUCTIONS
Discharge Instructions from  [address] [facility]  [address], [facility]  [phone] Fax [phone]    NAME:  Sudheer Rachel  YOB: 1970  MEDICAL RECORD NUMBER:  [id number]  DATE:  @ED@    Wound Cleansing:   Do not scrub or use excessive force. Cleanse wound prior to applying a clean dressing with:  [x] Normal Saline [] Keep Wound Dry in Shower    [x] Wound Cleanser   [] Cleanse wound with Mild Soap & Water  [] May Shower at Discharge   [] Other:      Topical Treatments:  Do not apply lotions, creams, or ointments to wound bed unless directed. [] Apply moisturizing lotion to skin surrounding the wound prior to dressing change.  [] Apply antifungal ointment to skin surrounding the wound prior to dressing change. [x] Apply thin film of moisture barrier ointment to skin immediately around wound.   [] Other:       Dressings:           Wound Location Right Hip   [x] Apply Primary Dressing:       [] MediHoney Gel [] Alginate with Silver [] Alginate   [] Collagen [x] Collagen with Silver   [] Santyl with Moisten saline gauze     [] Hydrocolloid   [] MediHoney Alginate [] Foam with Silver   [] Foam   [x] Hydrofera Blue    [] Mepilex Border    [] Moisten with Saline [] Hydrogel [x] Mepitel one      Negative Pressure:           Wound Location:   [x] Pressure@   125        mm/Hg  [x]Continuous []Intermittent   [x] Black  [] White Foam [] Other:   [x]Change dressing: [x]Three times per week    [x]Other: Hydrofer blue      Dietary:  [x] Diet as tolerated: [] Calorie Diabetic Diet: [] No Added Salt:  [x] Increase Protein: [] Other:   Activity:  [x] Activity as tolerated:  [x] Patient has no activity restrictions     [] Strict Bedrest: [] Remain off Work:     [] May return to full duty work:                                   [x] Return to work with restrictions:             Return Appointment:  [x] Wound and dressing supply provider:   [] ECF or Home Healthcare:  [x] Wound Assessment: [x] Physician or NP scheduled for Wound Assessment:   [x] Return Appointment: 1 Week(s)  [] Ordered tests:      Electronically signed Hernando Sena LPN on 9/5/0710 at [address]: Should you experience any significant changes in your wound(s) or have questions about your wound care, please contact the [facility] Main at [address] [place] [address] 8:00 am - 4:30. If you need help with your wound outside these hours and cannot wait until we are again available, contact your PCP or go to the hospital emergency room. PLEASE NOTE: IF YOU ARE UNABLE TO OBTAIN WOUND SUPPLIES, CONTINUE TO USE THE SUPPLIES YOU HAVE AVAILABLE UNTIL YOU ARE ABLE TO REACH US. IT IS MOST IMPORTANT TO KEEP THE WOUND COVERED AT ALL TIMES.      Physician Signature:_______________________    Date: ___________ Time:  ____________

## 2023-03-03 NOTE — WOUND CARE
Discharge Instructions from  [address] [facility]  [address], [facility] [facility]  [phone] Fax [phone]    NAME:  Tisha Crane  YOB: 1970  MEDICAL RECORD NUMBER:  [id number]  DATE:  3/2/2023    Wound Cleansing:   Do not scrub or use excessive force. Cleanse wound prior to applying a clean dressing with:  [x] Normal Saline [] Keep Wound Dry in Shower    [x] Wound Cleanser     Topical Treatments:e. [x] Apply thin film of moisture barrier ointment to skin immediately around wound. [x] Other:  Skin Prep     Dressings:           Wound Location Right Hip   [x] Apply Primary Dressing:      Pack loosely with Collagen with Silver in wound bed,Hydrofer blue rope making sure that you applied 4.5 cm in 11 O'clock tunnel and 2.0 cm in 2 O'clock tunnel with tail extended and secured with mepitel one to out side. [x] Moisten with Saline  Hydrogel [] Mepitel     Avoid contact of tape with skin. [x] Three times per week   :     Negative Pressure:           Wound Location:   [x] Pressure@    125       mm/Hg  [x]Continuous    [x] Black   [x] Other: Hydrofera Blue Rope  [x]Change dressing: [x]Three times per week    [x]Other:       Off-Loading:   [] Off-loading when [x] walking  [x] in bed [x] sitting  [] Total non-weight bearing  [] Right Leg  [] Left Leg       Dietary:  [x] Diet as tolerated: [] Calorie Diabetic Diet: [] No Added Salt:  [x] Increase Protein: [] Other:   Activity:  [x] Activity as tolerated:  [] Patient has no activity restrictions     [] Strict Bedrest: [] Remain off Work:     [] May return to full duty work:                                   [] Return to work with restrictions:             Return Appointment:  [x] Wound and dressing supply provider:   [x] ECF or Home Healthcare:  [x] Wound Assessment: [x] Physician or NP scheduled for Wound Assessment:   [] Return Appointment: 1  Week(s)  [] Ordered tests:   Verbal orders per.  Dr. Rolando Haas MD   Electronically signed Angela Padron LPN on 3/6/8852 at 0:01 PM     [address]: Should you experience any significant changes in your wound(s) or have questions about your wound care, please contact the [facility] at [address] [place] [address] 8:00 am - 4:30. If you need help with your wound outside these hours and cannot wait until we are again available, contact your PCP or go to the hospital emergency room. PLEASE NOTE: IF YOU ARE UNABLE TO OBTAIN WOUND SUPPLIES, CONTINUE TO USE THE SUPPLIES YOU HAVE AVAILABLE UNTIL YOU ARE ABLE TO REACH US. IT IS MOST IMPORTANT TO KEEP THE WOUND COVERED AT ALL TIMES.      Physician Signature:_______________________    Date: ___________ Time:  ____________

## 2023-03-09 ENCOUNTER — HOSPITAL ENCOUNTER (OUTPATIENT)
Facility: HOSPITAL | Age: 53
Discharge: HOME | End: 2023-03-09
Payer: COMMERCIAL

## 2023-03-09 VITALS
RESPIRATION RATE: 16 BRPM | HEART RATE: 55 BPM | TEMPERATURE: 98.3 F | OXYGEN SATURATION: 99 % | DIASTOLIC BLOOD PRESSURE: 90 MMHG | SYSTOLIC BLOOD PRESSURE: 161 MMHG

## 2023-03-09 DIAGNOSIS — I27.20 PULMONARY HYPERTENSION (HCC): Primary | ICD-10-CM

## 2023-03-09 DIAGNOSIS — S71.101D OPEN WOUND OF RIGHT THIGH, SUBSEQUENT ENCOUNTER: ICD-10-CM

## 2023-03-09 PROBLEM — G47.33 OSA (OBSTRUCTIVE SLEEP APNEA): Status: ACTIVE | Noted: 2023-02-01

## 2023-03-09 PROBLEM — N17.9 AKI (ACUTE KIDNEY INJURY) (HCC): Status: ACTIVE | Noted: 2023-02-01

## 2023-03-09 PROBLEM — I10 HTN (HYPERTENSION): Status: ACTIVE | Noted: 2023-02-08

## 2023-03-09 PROBLEM — E11.9 CONTROLLED TYPE 2 DIABETES MELLITUS, WITHOUT LONG-TERM CURRENT USE OF INSULIN (HCC): Status: ACTIVE | Noted: 2023-02-02

## 2023-03-09 PROCEDURE — 97605 NEG PRS WND THER DME<=50SQCM: CPT

## 2023-03-09 RX ORDER — CLOBETASOL PROPIONATE 0.5 MG/G
OINTMENT TOPICAL ONCE
OUTPATIENT
Start: 2023-03-09 | End: 2023-03-09

## 2023-03-09 RX ORDER — GINSENG 100 MG
CAPSULE ORAL ONCE
OUTPATIENT
Start: 2023-03-09 | End: 2023-03-09

## 2023-03-09 RX ORDER — GENTAMICIN SULFATE 1 MG/G
OINTMENT TOPICAL ONCE
OUTPATIENT
Start: 2023-03-09 | End: 2023-03-09

## 2023-03-09 RX ORDER — BACITRACIN ZINC AND POLYMYXIN B SULFATE 500; 1000 [USP'U]/G; [USP'U]/G
OINTMENT TOPICAL ONCE
OUTPATIENT
Start: 2023-03-09 | End: 2023-03-09

## 2023-03-09 RX ORDER — BACITRACIN, NEOMYCIN, POLYMYXIN B 400; 3.5; 5 [USP'U]/G; MG/G; [USP'U]/G
OINTMENT TOPICAL ONCE
OUTPATIENT
Start: 2023-03-09 | End: 2023-03-09

## 2023-03-09 RX ORDER — BETAMETHASONE DIPROPIONATE 0.05 %
OINTMENT (GRAM) TOPICAL ONCE
OUTPATIENT
Start: 2023-03-09 | End: 2023-03-09

## 2023-03-09 NOTE — FLOWSHEET NOTE
03/09/23 1037   Anesthetic   Anesthetic 2% Lidocaine Gel Topical   Wound 02/03/23 Thigh Anterior;Right   Date First Assessed/Time First Assessed: 02/03/23 1130   Primary Wound Type: Surgical Type  Location: Thigh  Wound Location Orientation: Anterior;Right   Wound Image     Wound Etiology Non-Healing Surgical   Dressing Status New dressing applied   Wound Cleansed Wound cleanser   Dressing/Treatment Collagen with Ag   Offloading for Diabetic Foot Ulcers Offloading not required   Dressing Change Due 03/16/23   Wound Length (cm) 1.5 cm   Wound Width (cm) 1.2 cm   Wound Depth (cm) 1.5 cm   Wound Surface Area (cm^2) 1.8 cm^2   Change in Wound Size % (l*w) 71.2   Wound Volume (cm^3) 2.7 cm^3   Wound Healing % 78   Distance Tunneling (cm) 7.7 cm   Tunneling Position ___ O'Clock 11   Wound Assessment Epithelialization   Drainage Amount Moderate   Drainage Description Serosanguinous   Odor None   Eileen-wound Assessment Blanchable erythema   Margins Defined edges   Wound Thickness Description not for Pressure Injury Full thickness   Pain Assessment   Pain Assessment 0-10   Patient's Stated Pain Goal 0 - No pain   Applied Negative pressure to right hip.

## 2023-03-09 NOTE — WOUND CARE
Negative Pressure Wound Therapy    NAME:  Disha Gardner  YOB: 1970  MEDICAL RECORD NUMBER:  [id number]  DATE:  3/9/2023    Applied Negative Pressure to Right Hip wound(s)/ulcer(s). [x] Applied skin barrier prep to felicita-wound. [x] Cut strips of plastic drape to picture frame wound so that felicita-wound is     covered with the drape. [x] If bridging dressing to less prominent site, cover any intact skin that will come in contact with the Negative Pressure Therapy sponge, gauze or channel drain with plastic drape. The sponge should never touch intact skin. [x] Cut sponge, gauze or channel drain to size which will fit into the wound/ulcer bed without being forced. Be sure the sponge is large enough to hold the entire round plastic flange which is attached to the tubing. Never allow flange to be larger than the sponge o[x]r it will produce suction damaging intact skin. Total number of individual pieces of foam used within the wound bed: 1 also applied Hydrofera Blue Rope to wound bed, secure with mepitel one. [x] If bridging the dressing away from the primary site, be sure the bridge leads to a piece of sponge large enough to hold the entire flange without allowing any of the flange to overlap onto intact skin. [x] Covered sponge, gauze or channel drain with plastic drape. [x] Cut a hole in this plastic drape directly over the sponge the same size as the plastic drain tubing. [x] Removed plastic liner from flange and apply it directly over the hole you cut. [x] Removed the plastic cover from the flange. [x] Attached the tubing to the wound/ulcer Negative Pressure Therapy and turn it on to be sure a vacuum is created and that there are no leaks. [x] If air leaks occur, use plastic drape to patch them. [x] Secured Negative Pressure Therapy dressing with ace wrap loosely if located on an extremity. Maintain tubing outside of ace wrap. Tubing must not exert pressure on intact skin. Applied per  Guidelines      Electronically signed by Alejandra Bell LPN on 4/1/5444 at 3:63 PM

## 2023-03-16 ENCOUNTER — HOSPITAL ENCOUNTER (OUTPATIENT)
Facility: HOSPITAL | Age: 53
Discharge: HOME | End: 2023-03-16
Payer: COMMERCIAL

## 2023-03-16 VITALS
SYSTOLIC BLOOD PRESSURE: 155 MMHG | OXYGEN SATURATION: 100 % | DIASTOLIC BLOOD PRESSURE: 84 MMHG | TEMPERATURE: 99.1 F | HEART RATE: 60 BPM | RESPIRATION RATE: 16 BRPM

## 2023-03-16 DIAGNOSIS — S71.101D OPEN WOUND OF RIGHT THIGH, SUBSEQUENT ENCOUNTER: Primary | ICD-10-CM

## 2023-03-16 PROCEDURE — 97597 DBRDMT OPN WND 1ST 20 CM/<: CPT

## 2023-03-16 RX ORDER — CLOBETASOL PROPIONATE 0.5 MG/G
OINTMENT TOPICAL ONCE
OUTPATIENT
Start: 2023-03-16 | End: 2023-03-16

## 2023-03-16 RX ORDER — GINSENG 100 MG
CAPSULE ORAL ONCE
OUTPATIENT
Start: 2023-03-16 | End: 2023-03-16

## 2023-03-16 RX ORDER — BACITRACIN, NEOMYCIN, POLYMYXIN B 400; 3.5; 5 [USP'U]/G; MG/G; [USP'U]/G
OINTMENT TOPICAL ONCE
OUTPATIENT
Start: 2023-03-16 | End: 2023-03-16

## 2023-03-16 RX ORDER — BETAMETHASONE DIPROPIONATE 0.05 %
OINTMENT (GRAM) TOPICAL ONCE
OUTPATIENT
Start: 2023-03-16 | End: 2023-03-16

## 2023-03-16 RX ORDER — BACITRACIN ZINC AND POLYMYXIN B SULFATE 500; 1000 [USP'U]/G; [USP'U]/G
OINTMENT TOPICAL ONCE
OUTPATIENT
Start: 2023-03-16 | End: 2023-03-16

## 2023-03-16 RX ORDER — GENTAMICIN SULFATE 1 MG/G
OINTMENT TOPICAL ONCE
OUTPATIENT
Start: 2023-03-16 | End: 2023-03-16

## 2023-03-16 ASSESSMENT — PAIN DESCRIPTION - FREQUENCY: FREQUENCY: INTERMITTENT

## 2023-03-16 ASSESSMENT — PAIN DESCRIPTION - PAIN TYPE: TYPE: ACUTE PAIN

## 2023-03-16 ASSESSMENT — PAIN DESCRIPTION - DESCRIPTORS: DESCRIPTORS: ACHING

## 2023-03-16 ASSESSMENT — PAIN DESCRIPTION - LOCATION: LOCATION: HIP

## 2023-03-16 ASSESSMENT — PAIN - FUNCTIONAL ASSESSMENT: PAIN_FUNCTIONAL_ASSESSMENT: ACTIVITIES ARE NOT PREVENTED

## 2023-03-16 ASSESSMENT — PAIN SCALES - GENERAL: PAINLEVEL_OUTOF10: 1

## 2023-03-16 ASSESSMENT — PAIN DESCRIPTION - ONSET: ONSET: GRADUAL

## 2023-03-16 NOTE — WOUND CARE
Negative Pressure Wound Therapy    NAME:  Beatrice Gardner  YOB: 1970  MEDICAL RECORD NUMBER:  [id number]  DATE:  3/16/2023    Applied Negative Pressure to Right Hip wound(s)/ulcer(s). [x] Applied skin barrier prep to felicita-wound. [x] Cut strips of plastic drape to picture frame wound so that felicita-wound is     covered with the drape. [x] If bridging dressing to less prominent site, cover any intact skin that will come in contact with the Negative Pressure Therapy sponge, gauze or channel drain with plastic drape. The sponge should never touch intact skin. [x] Cut sponge, gauze or channel drain to size which will fit into the wound/ulcer bed without being forced. [x] Be sure the sponge is large enough to hold the entire round plastic flange which is attached to the tubing. Never allow flange to be larger than the sponge or it will produce suction damaging intact skin. Total number of individual pieces of foam used within the wound bed: 2    [x] If bridging the dressing away from the primary site, be sure the bridge leads to a piece of sponge large enough to hold the entire flange without allowing any of the flange to overlap onto intact skin. [x] Covered sponge, gauze or channel drain with plastic drape. [x] Cut a hole in this plastic drape directly over the sponge the same size as the plastic drain tubing. [x] Removed plastic liner from flange and apply it directly over the hole you cut. [x] Removed the plastic cover from the flange. [x] Attached the tubing to the wound/ulcer Negative Pressure Therapy and turn it on to be sure a vacuum is created and that there are no leaks. [x] If air leaks occur, use plastic drape to patch them. [x] Secured Negative Pressure Therapy dressing with ace wrap loosely if located on an extremity. Maintain tubing outside of ace wrap. Tubing must not exert pressure on intact skin.     Applied per  Guidelines      Electronically signed by LaFourchette EDUARDO MOELLER on 5/10/5680 at 1:34 PM

## 2023-03-16 NOTE — WOUND CARE
[address]   Progress Note     Referring Provider: Post hospital DC--inpatient consult Dr Cher Chang  Reason for Referral: Post hospital discharged, wound care    Mendel River Gardner  MEDICAL RECORD NUMBER:  [id number]  AGE: 46 y.o. GENDER: male  : 1970  EPISODE DATE:  3/16/2023    Chief complaint and reason for visit:     FU wound check post surgical for an abscess-- R proximal thigh        HISTORY of PRESENT ILLNESS HPI     Unknown Jose is a 46 y.o. male with PMH of HTN, Morbid Obesity, GERD, Pul hypertension, with admission to [facility] from  to 23. He was admitted with sepsis and had right upper thigh cellulitis and abscess. He was treated with IV antibiotics and on 23 underwent I and D of deep abscess. OR culture remain negative. He was treated with IV ceftriaxone for 2 weeks and is here for wound care of his thigh. Wound duration: since (date) 23 . History of Wound Context: Surgical-- I and D  Pertinent associated symptoms: pain severity: intermittent, moderate    Contributing factors:  Diabetes mellitis: HB A1C- 6.6%( 2023)  Smoking: No   Vascular disease: N/A    Today's visit 3/16/2023 :     Here for weekly FU. Wound vac placed with [facility], he is doing ok with it. No redness/redness. His wound vac packing not exactly the way we recommended to Chacorta Kam. No fever or chills  He is currently off PO antibiotics  He asks if he can take testosterone that was his routine med before  He asks if he can take Prednisone for back pain. Finishing his oral abx tomorrow.     Denies new joint pian or swelling  Denies dysuria/Flank pain/ hematuria  Denies rash/ itching     PAST MEDICAL HISTORY        Diagnosis Date    Arthritis     spine    Chronic pain     lumbar    GERD (gastroesophageal reflux disease)     Sleep apnea     CPAP in the past, was 300 lbs, lost weight       SOCIAL HISTORY  Social History     Tobacco Use    Smoking status: Never    Smokeless tobacco: Never Substance Use Topics    Alcohol use: Yes     Alcohol/week: 5.0 standard drinks    Drug use: No       ALLERGIES  No Known Allergies    MEDICATIONS  No current outpatient medications on file. No current facility-administered medications for this encounter. He needs to bring his medication list prior to next visit. Objective:      BP (!) 155/84   Pulse 60   Temp 99.1 °F (37.3 °C) (Oral)   Resp 16   SpO2 100%        GEN: WD Morbidly obese, not in resp distress. HEENT: Unicteric. EOMI intact  CHEST: Non laboured breathing  LUCY: Deferred  EXT: No apparent swelling or redness on UE/LE joints. CNS: A, OX3. Moves all extremity. CN grossly ok. Skin: see wound chart for measurements  Wound vac placed with help of RN    Assessment- wound clinic related     R thigh deep wound --post surgical-- slightly smaller than last week exam-- see wound chart  DM     Medical issues addressed:  GBS infection on IV ceftriaxone-> Cefuroxime PO BID- infection resolved. Stop treatment  Back pain from Lumbar stenosis  Morbidly Obese    Plan:     Dressing to wound- and wound vac placement in clinic- done-- see wound chart for details  I and D indicated today-- done see belowy  FU in 1 week        Diagnosis and treatment plan reviewed with patient. Questions and concerns discussed with patient.      Lisa Colmenares MD on 3/16/2023

## 2023-03-16 NOTE — DISCHARGE INSTRUCTIONS
Discharge Instructions from  [facility]  [address]  [phone] Fax [phone]    NAME:  Amy Brumfield  YOB: 1970  MEDICAL RECORD NUMBER:  [id number]  DATE:  @ED@    Wound Cleansing:   Do not scrub or use excessive force. Cleanse wound prior to applying a clean dressing with:  [] Normal Saline [] Keep Wound Dry in Shower    [x] Wound Cleanser   [] Cleanse wound with Mild Soap & Water  [] May Shower at Discharge   [] Other:      Topical Treatments:  Do not apply lotions, creams, or ointments to wound bed unless directed. [] Apply moisturizing lotion to skin surrounding the wound prior to dressing change.  [] Apply antifungal ointment to skin surrounding the wound prior to dressing change. [x] Apply thin film of moisture barrier ointment to skin immediately around wound.   [] Other:       Dressings:           Wound Location Right Hip   [x] Apply Primary Dressing:       [] MediHoney Gel [] Alginate with Silver [] Alginate   [] Collagen [x] Collagen with Silver   [] Santyl with Moisten saline gauze     [] Hydrocolloid   [] MediHoney Alginate [] Foam with Silver   [] Foam   [] Hydrofera Blue    [] Mepilex Border    [] Moisten with Saline [] Hydrogel [] Mepitel     [] Bactroban/Mupirocin [] Polysporin  [] Other:       Negative Pressure:           Wound Location:   [x] Pressure@        125   mm/Hg  [x]Continuous []Intermittent   [x] Black  [] White Foam [] Other:   []Change dressing: [x]Two times per week    []Other:     Dietary:  [x] Diet as tolerated: [] Calorie Diabetic Diet: [] No Added Salt:  [x] Increase Protein: [] Other:   Activity:  [x] Activity as tolerated:  [x] Patient has no activity restrictions     [] Strict Bedrest: [] Remain off Work:     [] May return to full duty work:                                   [] Return to work with restrictions:             Return Appointment:  [x] Wound and dressing supply provider:   [x] ECF or Home Healthcare:  [x] Wound Assessment: [x] Physician or NP scheduled for Wound Assessment:   [] Return Appointment: 1 Week(s)  [] Ordered tests:      Electronically signed Sindhu Brice LPN on 6/20/0473 at 2:67 PM     Nahomy Muñoz 281: Should you experience any significant changes in your wound(s) or have questions about your wound care, please contact the [facility] Main at [address] [place] [address] 8:00 am - 4:30. If you need help with your wound outside these hours and cannot wait until we are again available, contact your PCP or go to the hospital emergency room. PLEASE NOTE: IF YOU ARE UNABLE TO OBTAIN WOUND SUPPLIES, CONTINUE TO USE THE SUPPLIES YOU HAVE AVAILABLE UNTIL YOU ARE ABLE TO REACH US. IT IS MOST IMPORTANT TO KEEP THE WOUND COVERED AT ALL TIMES.      Physician Signature:_______________________    Date: ___________ Time:  ____________

## 2023-03-16 NOTE — FLOWSHEET NOTE
03/16/23 1010   Anesthetic   Anesthetic 2% Lidocaine Gel Topical   Wound 02/03/23 Thigh Anterior;Right   Date First Assessed/Time First Assessed: 02/03/23 1130   Primary Wound Type: Surgical Type  Location: Thigh  Wound Location Orientation: Anterior;Right   Wound Image     Wound Etiology Non-Healing Surgical   Dressing Status New dressing applied   Wound Cleansed Wound cleanser   Dressing/Treatment Collagen with Ag   Offloading for Diabetic Foot Ulcers Offloading not required   Dressing Change Due 03/23/23   Wound Length (cm) 0.7 cm   Wound Width (cm) 1 cm   Wound Depth (cm) 1.7 cm   Wound Surface Area (cm^2) 0.7 cm^2   Change in Wound Size % (l*w) 88.8   Wound Volume (cm^3) 1.19 cm^3   Wound Healing % 90   Distance Tunneling (cm) 6.2 cm   Tunneling Position ___ O'Clock 11   Wound Assessment Epithelialization   Drainage Amount Moderate   Drainage Description Serosanguinous   Odor None   Eileen-wound Assessment Blanchable erythema   Margins Defined edges   Wound Thickness Description not for Pressure Injury Full thickness   Pain Assessment   Pain Assessment 0-10   Pain Level 1   Patient's Stated Pain Goal 1   Pain Location Hip   Pain Descriptors Aching   Functional Pain Assessment Activities are not prevented   Pain Type Acute pain   Pain Frequency Intermittent   Pain Onset Gradual   Response to Pain Intervention None (pain unchanged or no improvement)   Side Effects No reported side effects   Multiple Pain Sites No

## 2023-03-23 ENCOUNTER — HOSPITAL ENCOUNTER (OUTPATIENT)
Facility: HOSPITAL | Age: 53
Discharge: HOME | End: 2023-03-23
Payer: COMMERCIAL

## 2023-03-23 VITALS
OXYGEN SATURATION: 99 % | TEMPERATURE: 98.6 F | DIASTOLIC BLOOD PRESSURE: 67 MMHG | HEART RATE: 56 BPM | RESPIRATION RATE: 16 BRPM | SYSTOLIC BLOOD PRESSURE: 120 MMHG

## 2023-03-23 DIAGNOSIS — S71.101D OPEN WOUND OF RIGHT THIGH, SUBSEQUENT ENCOUNTER: Primary | ICD-10-CM

## 2023-03-23 PROCEDURE — 11043 DBRDMT MUSC&/FSCA 1ST 20/<: CPT

## 2023-03-23 RX ORDER — BACITRACIN ZINC AND POLYMYXIN B SULFATE 500; 1000 [USP'U]/G; [USP'U]/G
OINTMENT TOPICAL ONCE
OUTPATIENT
Start: 2023-03-23 | End: 2023-03-23

## 2023-03-23 RX ORDER — BACITRACIN, NEOMYCIN, POLYMYXIN B 400; 3.5; 5 [USP'U]/G; MG/G; [USP'U]/G
OINTMENT TOPICAL ONCE
OUTPATIENT
Start: 2023-03-23 | End: 2023-03-23

## 2023-03-23 RX ORDER — GENTAMICIN SULFATE 1 MG/G
OINTMENT TOPICAL ONCE
OUTPATIENT
Start: 2023-03-23 | End: 2023-03-23

## 2023-03-23 RX ORDER — BETAMETHASONE DIPROPIONATE 0.05 %
OINTMENT (GRAM) TOPICAL ONCE
OUTPATIENT
Start: 2023-03-23 | End: 2023-03-23

## 2023-03-23 RX ORDER — GINSENG 100 MG
CAPSULE ORAL ONCE
OUTPATIENT
Start: 2023-03-23 | End: 2023-03-23

## 2023-03-23 RX ORDER — CLOBETASOL PROPIONATE 0.5 MG/G
OINTMENT TOPICAL ONCE
OUTPATIENT
Start: 2023-03-23 | End: 2023-03-23

## 2023-03-23 NOTE — FLOWSHEET NOTE
03/23/23 0954   Anesthetic   Anesthetic 2% Lidocaine Gel Topical   Wound 02/03/23 Thigh Anterior;Right   Date First Assessed/Time First Assessed: 02/03/23 1130   Primary Wound Type: Surgical Type  Location: Thigh  Wound Location Orientation: Anterior;Right   Wound Image    Wound Etiology Non-Healing Surgical   Dressing Status New dressing applied; Intact; New drainage noted; Old drainage noted   Wound Cleansed Wound cleanser   Dressing/Treatment Collagen with Ag;Negative pressure wound therapy   Offloading for Diabetic Foot Ulcers Offloading not required   Dressing Change Due 03/30/23   Wound Length (cm) 1 cm   Wound Width (cm) 1 cm   Wound Depth (cm) 2 cm   Wound Surface Area (cm^2) 1 cm^2   Change in Wound Size % (l*w) 84   Wound Volume (cm^3) 2 cm^3   Wound Healing % 84   Undermining Starts ___ O'Clock 11   Undermining Ends___ O'Clock 1   Undermining Maxium Distance (cm) 6.2   Wound Assessment Devitalized tissue; Epithelialization   Drainage Amount Moderate   Drainage Description Serosanguinous   Odor None   Eileen-wound Assessment Blanchable erythema   Margins Defined edges   Wound Thickness Description not for Pressure Injury Full thickness   Pain Assessment   Pain Assessment 0-10   Pain Level 0   Patient's Stated Pain Goal 0 - No pain   Wound Follow Up   Require Follow Up Yes

## 2023-03-23 NOTE — DISCHARGE INSTRUCTIONS
Discharge Instructions from  [facility]  [address] [facility] [facility]  [phone] Fax [phone]    NAME:  Maegan Gan  YOB: 1970  MEDICAL RECORD NUMBER:  [id number]  DATE:  @ED@    Wound Cleansing:   Do not scrub or use excessive force. Cleanse wound prior to applying a clean dressing with:  [x] Normal Saline [x] Keep Wound Dry in Shower    [x] Wound Cleanser   [] Cleanse wound with Mild Soap & Water  [] May Shower at Discharge   [] Other:      Topical Treatments:  Do not apply lotions, creams, or ointments to wound bed unless directed. [] Apply moisturizing lotion to skin surrounding the wound prior to dressing change.  [] Apply antifungal ointment to skin surrounding the wound prior to dressing change. [x] Apply thin film of moisture barrier ointment to skin immediately around wound. [] Other:       Dressings:           Wound Location Right Hip   [x] Apply Primary Dressing:       [] MediHoney Gel [] Alginate with Silver [] Alginate   [] Collagen [x] Collagen with Silver   [] Santyl with Moisten saline gauze     [] Hydrocolloid   [] MediHoney Alginate [] Foam with Silver   [] Foam   [x] Hydrofera Blue Rope   [] Mepilex Border    [] Moisten with Saline [] Hydrogel [x] Mepitel one         [x] Cover and Secure with:     [] Gauze [] Kera [] Kerlix   [] Ace Wrap [] Cover Roll Tape [] ABD     [x] Other: Drape   Avoid contact of tape with skin.   [x] Change dressing: [] Daily    [] Every Other Day [] Three times per week   [x] Once a week [] Do Not Change Dressing   [] Other:     Negative Pressure:           Wound Location:   [x] Pressure@ 125           mm/Hg  [x]Continuous []Intermittent   [x] Black  [] White Foam [] Other:   [x]Change dressing: [x] One time per weekly    []Other:     Pressure Relief:  [x] When sitting, shift position or do seat lifts every 15 minutes.  [] Wheelchair cushion [] Specialty Bed/Mattress  [x] Turn every 2

## 2023-03-23 NOTE — WOUND CARE
Negative Pressure Wound Therapy    NAME:  Kendal Gardner  YOB: 1970  MEDICAL RECORD NUMBER:  [id number]  DATE:  3/23/2023    Applied Negative Pressure to Right Hip wound(s)/ulcer(s). [x] Applied skin barrier prep to felicita-wound. [x] Cut strips of plastic drape to picture frame wound so that felicita-wound is     covered with the drape. [x] If bridging dressing to less prominent site, cover any intact skin that will come in contact with the Negative Pressure Therapy sponge, gauze or channel drain with plastic drape. The sponge should never touch intact skin. [x] Cut sponge, gauze or channel drain to size which will fit into the wound/ulcer bed without being forced. [x] Be sure the sponge is large enough to hold the entire round plastic flange which is attached to the tubing. Never allow flange to be larger than the sponge or it will produce suction damaging intact skin. Total number of individual pieces of foam used within the wound bed: 3    [x] If bridging the dressing away from the primary site, be sure the bridge leads to a piece of sponge large enough to hold the entire flange without allowing any of the flange to overlap onto intact skin. [x] Covered sponge, gauze or channel drain with plastic drape. [x] Cut a hole in this plastic drape directly over the sponge the same size as the plastic drain tubing. [x] Removed plastic liner from flange and apply it directly over the hole you cut. [x] Removed the plastic cover from the flange. [x] Attached the tubing to the wound/ulcer Negative Pressure Therapy and turn it on to be sure a vacuum is created and that there are no leaks. [x] If air leaks occur, use plastic drape to patch them. [x] Secured Negative Pressure Therapy dressing with ace wrap loosely if located on an extremity. Maintain tubing outside of ace wrap. Tubing must not exert pressure on intact skin.     Applied per  Guidelines      Electronically signed by Vend

## 2023-03-30 ENCOUNTER — HOSPITAL ENCOUNTER (OUTPATIENT)
Facility: HOSPITAL | Age: 53
Discharge: HOME | End: 2023-03-30
Payer: COMMERCIAL

## 2023-03-30 VITALS
DIASTOLIC BLOOD PRESSURE: 90 MMHG | RESPIRATION RATE: 18 BRPM | SYSTOLIC BLOOD PRESSURE: 155 MMHG | TEMPERATURE: 98.8 F | HEART RATE: 63 BPM | OXYGEN SATURATION: 99 %

## 2023-03-30 PROCEDURE — 97605 NEG PRS WND THER DME<=50SQCM: CPT

## 2023-03-30 RX ORDER — SULFAMETHOXAZOLE AND TRIMETHOPRIM 800; 160 MG/1; MG/1
1 TABLET ORAL 2 TIMES DAILY
COMMUNITY
Start: 2023-03-27

## 2023-03-30 NOTE — FLOWSHEET NOTE
03/30/23 1722   Wound 02/03/23 Thigh Anterior;Right   Date First Assessed/Time First Assessed: 02/03/23 1130   Primary Wound Type: Surgical Type  Location: Thigh  Wound Location Orientation: Anterior;Right   Wound Image    Wound Etiology Non-Healing Surgical   Dressing Status New dressing applied   Wound Cleansed Wound cleanser   Dressing/Treatment Collagen with Ag;Negative pressure wound therapy   Offloading for Diabetic Foot Ulcers Offloading not required   Dressing Change Due 04/03/23   Wound Length (cm) 0.8 cm   Wound Width (cm) 0.7 cm   Wound Depth (cm) 1.2 cm   Wound Surface Area (cm^2) 0.56 cm^2   Change in Wound Size % (l*w) 91.04   Wound Volume (cm^3) 0.672 cm^3   Wound Healing % 95   Wound Assessment Devitalized tissue   Drainage Amount Moderate   Drainage Description Serosanguinous   Odor None   Eileen-wound Assessment Blanchable erythema   Margins Defined edges   Wound Thickness Description not for Pressure Injury Full thickness   Pain Assessment   Pain Assessment 0-10   Pain Level 0   Patient's Stated Pain Goal 0 - No pain

## 2023-03-30 NOTE — DISCHARGE INSTRUCTIONS
Discharge Instructions from  [address] [facility]  [address] [place] [facility]  [phone] Fax [phone]    NAME:  Joanna Wood  YOB: 1970  MEDICAL RECORD NUMBER:  [id number]  DATE:  @ED@    Wound Cleansing:   Do not scrub or use excessive force. Cleanse wound prior to applying a clean dressing with:  [] Normal Saline [] Keep Wound Dry in Shower    [] Wound Cleanser   [] Cleanse wound with Mild Soap & Water  [] May Shower at Discharge   [] Other:      Topical Treatments:  Do not apply lotions, creams, or ointments to wound bed unless directed. [] Apply moisturizing lotion to skin surrounding the wound prior to dressing change.  [] Apply antifungal ointment to skin surrounding the wound prior to dressing change.  [] Apply thin film of moisture barrier ointment to skin immediately around wound. [x] Other:  skin prep     Dressings:           Wound Location Sacrum   [x] Apply Primary Dressing:       [] MediHoney Gel [] Alginate with Silver [] Alginate   [] Collagen [x] Collagen with Silver   [] Santyl with Moisten saline gauze     [] Hydrocolloid   [] MediHoney Alginate [] Foam with Silver   [] Foam   [] Hydrofera Blue    [] Mepilex Border    [] Moisten with Saline [] Hydrogel [x] Mepitel one     [] Bactroban/Mupirocin [] Polysporin  [] Other:       Negative Pressure:           Wound Location:   [x] Pressure@  125         mm/Hg  [x]Continuous []Intermittent   [x] Black  [] White Foam [] Other:   [x]Change dressing: []Two times per week    []Other:     Pressure Relief:  [x] When sitting, shift position or do seat lifts every 15 minutes. [x] Wheelchair cushion [] Specialty Bed/Mattress  [x] Turn every 2 hours when in bed. Avoid position directing pressure on wound site. Limit side lying to 30 degree tilt. Limit HOB elevation to 30 degrees.          Off-Loading:   [] Off-loading when [] walking  [] in bed [] sitting  [] Total non-weight bearing

## 2023-04-01 NOTE — WOUND CARE
Negative Pressure Wound Therapy    NAME:  Lupillo Gardner  YOB: 1970  MEDICAL RECORD NUMBER:  [id number]  DATE:  3/30/2023    Applied Negative Pressure to Hip wound(s)/ulcer(s). [x] Applied skin barrier prep to felicita-wound. [x] Cut strips of plastic drape to picture frame wound so that felicita-wound is     covered with the drape. [x] If bridging dressing to less prominent site, cover any intact skin that will come in contact with the Negative Pressure Therapy sponge, gauze or channel drain with plastic drape. The sponge should never touch intact skin. [x] Cut sponge, gauze or channel drain to size which will fit into the wound/ulcer bed without being forced. [x] Be sure the sponge is large enough to hold the entire round plastic flange which is attached to the tubing. Never allow flange to be larger than the sponge or it will produce suction damaging intact skin. Total number of individual pieces of foam used within the wound bed: 1    [x] If bridging the dressing away from the primary site, be sure the bridge leads to a piece of sponge large enough to hold the entire flange without allowing any of the flange to overlap onto intact skin. [x] Covered sponge, gauze or channel drain with plastic drape. [x] Cut a hole in this plastic drape directly over the sponge the same size as the plastic drain tubing. [x] Removed plastic liner from flange and apply it directly over the hole you cut. [x] Removed the plastic cover from the flange. [x] Attached the tubing to the wound/ulcer Negative Pressure Therapy and turn it on to be sure a vacuum is created and that there are no leaks. [x] If air leaks occur, use plastic drape to patch them. [x] Secured Negative Pressure Therapy dressing with ace wrap loosely if located on an extremity. Maintain tubing outside of ace wrap. Tubing must not exert pressure on intact skin.     Applied per  Guidelines      Electronically signed by [place]
lbs, lost weight       SOCIAL HISTORY  Social History     Tobacco Use    Smoking status: Never    Smokeless tobacco: Never   Substance Use Topics    Alcohol use: Yes     Alcohol/week: 5.0 standard drinks    Drug use: No       ALLERGIES  No Known Allergies    MEDICATIONS  Current Outpatient Medications   Medication Sig Dispense Refill    sulfamethoxazole-trimethoprim (BACTRIM DS;SEPTRA DS) 800-160 MG per tablet Take 1 tablet by mouth 2 times daily       No current facility-administered medications for this encounter. He needs to bring his medication list prior to next visit. Objective:      BP (!) 155/90   Pulse 63   Temp 98.8 °F (37.1 °C) (Oral)   Resp 18   SpO2 99%        GEN: WD Morbidly obese, not in resp distress. HEENT: Unicteric. EOMI intact  CHEST: Non laboured breathing  LUCY: Deferred  EXT: No apparent swelling or redness on UE/LE joints. CNS: A, OX3. Moves all extremity. CN grossly ok. Skin: see wound chart for measurements  Wound vac placed with help of RN    Assessment- wound clinic related     R thigh deep wound --post surgical-- slightly smaller than last week exam-- see wound chart  DM     Medical issues addressed:  GBS infection on IV ceftriaxone-> Cefuroxime PO BID- infection resolved. Stop treatment  Back pain from Lumbar stenosis  Morbidly Obese    Plan:     Dressing to wound- and wound vac placement in clinic- done-- see wound chart for details  I and D indicated today-- done see below  FU in 1 week    Procedure Note  Indications: Based on my examination of this patient's wound(s)/ulcer(s) today, debridement is required to promote healing and evaluate the wound base. Debridement: Excisional Debridement     Using: curette the wound(s)/ulcer(s) was/were debrided down through and including the removal of epidermis, dermis, and subcutaneous tissue.   Performed by: Chandu Nicole MD  Consent obtained: Yes  Time out taken: Yes  Pain Control:       Lidocaine 2% gel applied  Devitalized

## 2023-04-03 ENCOUNTER — HOSPITAL ENCOUNTER (OUTPATIENT)
Facility: HOSPITAL | Age: 53
Discharge: HOME | End: 2023-04-03
Payer: COMMERCIAL

## 2023-04-03 VITALS
RESPIRATION RATE: 18 BRPM | OXYGEN SATURATION: 99 % | HEART RATE: 56 BPM | TEMPERATURE: 98.9 F | SYSTOLIC BLOOD PRESSURE: 142 MMHG | DIASTOLIC BLOOD PRESSURE: 90 MMHG

## 2023-04-03 DIAGNOSIS — S71.101A OPEN WOUND OF RIGHT THIGH, INITIAL ENCOUNTER: Primary | ICD-10-CM

## 2023-04-03 PROCEDURE — 99212 OFFICE O/P EST SF 10 MIN: CPT

## 2023-04-03 RX ORDER — LIDOCAINE HYDROCHLORIDE 20 MG/ML
JELLY TOPICAL PRN
Status: DISCONTINUED | OUTPATIENT
Start: 2023-04-03 | End: 2023-04-04 | Stop reason: HOSPADM

## 2023-04-03 NOTE — WOUND CARE
In wound care clinic today:04/03/2023  Cleanse wound with NS or soap and water or commercial wound cleanser  Apply the following topically to wound bed:2% Lidocaine  Apply the following to felicita-wound:Cleansed with wound cleanser. Apply the following dressings:Negative Pressure was placed on hold felicita Dr. Murali Woodson MD.  Leatha Kells was applied in wound bed and anchor with Mepitel one, Alginate  and ABD, secure with drape. For Home Care/Self Care  Frequency:  Cleanse wound with NS or soap and water or commercial wound cleanser  Patient may not shower without dressing  Keep dressing dry and intact when bathing  Keep Hydrofera blue in placed and change only the outer dressing. Apply the following skin prep to skin around wound:  Apply the following dressings:Alginate Ag, exudry and secure with Drape. Change dressings when needed. Patient to return for wound care in:  7 Days    PLEASE CONTACT OFFICE AS SOON AS POSSIBLE IF UNABLE TO MAKE THIS APPOINTMENT. Inspect your wounds, looking for signs of infection which may include the following:  Increase in redness  Red \"streaks\" from wound  Increase in swelling  Fever  Unusual odor  Change in the amount of wound drainage. Should you experience any significant changes in your wound(s) or have any questions regarding your home care instructions please contact the wound center or your home health company. If after regular business hours, please call your family doctor or local emergency room. Edema Control: Elevate legs as much as possible. Avoid standing in one position for more than 10 minutes. Avoid setting with legs down. Do not cross legs while sitting. Off-Loading:Frequent position changes. Do not cross legs while sitting. Shift weight every 20 minutes or more when sitting for prolonged periods of time.

## 2023-04-03 NOTE — DISCHARGE INSTRUCTIONS
Salt:  [] Increase Protein: [] Other:   Activity:  [x] Activity as tolerated:  [] Patient has no activity restrictions     [] Strict Bedrest: [] Remain off Work:     [] May return to full duty work:                                   [] Return to work with restrictions:             Return Appointment:  [x] Wound and dressing supply provider:   [] ECF or Home Healthcare:  [x] Wound Assessment: [x] Physician or NP scheduled for Wound Assessment:   [x] Return Appointment: 1 Week(s)  [] Ordered tests:      Electronically signed Sebastián Crawford LPN on 8/0/8959 at 4:12 PM     Nahomy Muñoz 281: Should you experience any significant changes in your wound(s) or have questions about your wound care, please contact the [facility] Main at [address] [place] [address] 8:00 am - 4:30. If you need help with your wound outside these hours and cannot wait until we are again available, contact your PCP or go to the hospital emergency room. PLEASE NOTE: IF YOU ARE UNABLE TO OBTAIN WOUND SUPPLIES, CONTINUE TO USE THE SUPPLIES YOU HAVE AVAILABLE UNTIL YOU ARE ABLE TO REACH US. IT IS MOST IMPORTANT TO KEEP THE WOUND COVERED AT ALL TIMES.      Physician Signature:_______________________    Date: ___________ Time:  ____________

## 2023-04-03 NOTE — PROGRESS NOTES
Description Serosanguinous 04/03/23 1101   Odor None 04/03/23 1101   Eileen-wound Assessment Blanchable erythema; Warm;Other (Comment) 04/03/23 1101   Margins Defined edges 04/03/23 1101   Wound Thickness Description not for Pressure Injury Full thickness 04/03/23 1101   Number of days: 58          -------    Past Medical History:   Diagnosis Date    Arthritis     spine    Chronic pain     lumbar    Diabetes mellitus (HCC)     GERD (gastroesophageal reflux disease)     History of blood transfusion     Hx of blood clots     Hypertension     Sleep apnea     CPAP in the past, was 300 lbs, lost weight     Past Surgical History:   Procedure Laterality Date    ORTHOPEDIC SURGERY  1990    right ankle surgery     No family history on file. Social History     Socioeconomic History    Marital status:    Tobacco Use    Smoking status: Never    Smokeless tobacco: Never   Substance and Sexual Activity    Alcohol use: Yes     Alcohol/week: 5.0 standard drinks    Drug use: No        Prior to Admission medications    Medication Sig Start Date End Date Taking?  Authorizing Provider   sulfamethoxazole-trimethoprim (BACTRIM DS;SEPTRA DS) 800-160 MG per tablet Take 1 tablet by mouth 2 times daily 3/27/23   Historical Provider, MD      No Known Allergies       Signed By: Edil Johns MD

## 2023-04-03 NOTE — FLOWSHEET NOTE
04/03/23 1101   Anesthetic   Anesthetic 2% Lidocaine Gel Topical   Wound 02/03/23 Thigh Anterior;Right   Date First Assessed/Time First Assessed: 02/03/23 1130   Primary Wound Type: Surgical Type  Location: Thigh  Wound Location Orientation: Anterior;Right   Wound Image     Wound Etiology Non-Healing Surgical   Dressing Status New drainage noted;New dressing applied   Wound Cleansed Wound cleanser   Dressing/Treatment Collagen with Ag;Hydrofera blue;Non adherent;Steri-strips; Other (comment)   Offloading for Diabetic Foot Ulcers Offloading not required  (Secure dressing with drape.)   Dressing Change Due 04/13/23   Wound Length (cm) 1 cm   Wound Width (cm) 0.8 cm   Wound Depth (cm) 1 cm   Wound Surface Area (cm^2) 0.8 cm^2   Change in Wound Size % (l*w) 87.2   Wound Volume (cm^3) 0.8 cm^3   Wound Healing % 94   Undermining Starts ___ O'Clock 11   Undermining Maxium Distance (cm) 5.2   Wound Assessment Devitalized tissue;Erythema;Pink/red   Drainage Amount Moderate   Drainage Description Serosanguinous   Odor None   Eileen-wound Assessment Blanchable erythema; Warm;Other (Comment)   Margins Defined edges  (Rash, from the drainage.)   Wound Thickness Description not for Pressure Injury Full thickness   Pain Assessment   Pain Assessment 0-10   Wound Follow Up   Require Follow Up Yes     Dr. Celio Long placed negative pressure on hold, 04/10/2023.

## 2023-04-06 ENCOUNTER — HOSPITAL ENCOUNTER (OUTPATIENT)
Facility: HOSPITAL | Age: 53
Discharge: HOME | End: 2023-04-06
Payer: COMMERCIAL

## 2023-04-06 DIAGNOSIS — S71.101D OPEN WOUND OF RIGHT THIGH, SUBSEQUENT ENCOUNTER: ICD-10-CM

## 2023-04-06 DIAGNOSIS — S71.101A OPEN WOUND OF RIGHT THIGH, INITIAL ENCOUNTER: Primary | ICD-10-CM

## 2023-04-06 PROCEDURE — 97607 NEG PRS WND THR NDME<=50SQCM: CPT

## 2023-04-06 RX ORDER — BACITRACIN, NEOMYCIN, POLYMYXIN B 400; 3.5; 5 [USP'U]/G; MG/G; [USP'U]/G
OINTMENT TOPICAL ONCE
OUTPATIENT
Start: 2023-04-06 | End: 2023-04-06

## 2023-04-06 RX ORDER — BETAMETHASONE DIPROPIONATE 0.05 %
OINTMENT (GRAM) TOPICAL ONCE
OUTPATIENT
Start: 2023-04-06 | End: 2023-04-06

## 2023-04-06 RX ORDER — GINSENG 100 MG
CAPSULE ORAL ONCE
OUTPATIENT
Start: 2023-04-06 | End: 2023-04-06

## 2023-04-06 RX ORDER — BACITRACIN ZINC AND POLYMYXIN B SULFATE 500; 1000 [USP'U]/G; [USP'U]/G
OINTMENT TOPICAL ONCE
OUTPATIENT
Start: 2023-04-06 | End: 2023-04-06

## 2023-04-06 RX ORDER — CLOBETASOL PROPIONATE 0.5 MG/G
OINTMENT TOPICAL ONCE
OUTPATIENT
Start: 2023-04-06 | End: 2023-04-06

## 2023-04-06 RX ORDER — GENTAMICIN SULFATE 1 MG/G
OINTMENT TOPICAL ONCE
OUTPATIENT
Start: 2023-04-06 | End: 2023-04-06

## 2023-04-06 ASSESSMENT — PAIN DESCRIPTION - PAIN TYPE: TYPE: ACUTE PAIN

## 2023-04-06 ASSESSMENT — PAIN - FUNCTIONAL ASSESSMENT: PAIN_FUNCTIONAL_ASSESSMENT: ACTIVITIES ARE NOT PREVENTED

## 2023-04-06 ASSESSMENT — PAIN DESCRIPTION - LOCATION: LOCATION: HIP

## 2023-04-06 ASSESSMENT — PAIN DESCRIPTION - ONSET: ONSET: GRADUAL

## 2023-04-06 ASSESSMENT — PAIN SCALES - GENERAL: PAINLEVEL_OUTOF10: 4

## 2023-04-06 ASSESSMENT — PAIN DESCRIPTION - DESCRIPTORS: DESCRIPTORS: BURNING;ACHING

## 2023-04-06 ASSESSMENT — PAIN DESCRIPTION - FREQUENCY: FREQUENCY: CONTINUOUS

## 2023-04-06 NOTE — FLOWSHEET NOTE
04/06/23 0955   Wound 02/03/23 Thigh Anterior;Right   Date First Assessed/Time First Assessed: 02/03/23 1130   Primary Wound Type: Surgical Type  Location: Thigh  Wound Location Orientation: Anterior;Right   Wound Image    Wound Etiology Non-Healing Surgical   Dressing Status New drainage noted   Wound Cleansed Irrigated with saline   Dressing/Treatment Collagen with Ag   Offloading for Diabetic Foot Ulcers Offloading not required   Dressing Change Due 04/13/23   Wound Length (cm) 0.5 cm   Wound Width (cm) 0.5 cm   Wound Depth (cm) 1 cm   Wound Surface Area (cm^2) 0.25 cm^2   Change in Wound Size % (l*w) 96   Wound Volume (cm^3) 0.25 cm^3   Wound Healing % 98   Undermining Starts ___ O'Clock 11   Undermining Maxium Distance (cm) 6.2   Wound Assessment Devitalized tissue;Granulation tissue;Pink/red   Drainage Amount Moderate   Drainage Description Serosanguinous   Odor None   Eileen-wound Assessment Blanchable erythema   Margins Defined edges   Wound Thickness Description not for Pressure Injury Full thickness   Pain Assessment   Pain Level 4   Pain Location Hip   Pain Descriptors Burning;Aching   Functional Pain Assessment Activities are not prevented   Pain Type Acute pain   Pain Frequency Continuous   Pain Onset Gradual   Response to Pain Intervention None (pain unchanged or no improvement)

## 2023-04-06 NOTE — DISCHARGE INSTRUCTIONS
Other:   Activity:  [x] Activity as tolerated:  [] Patient has no activity restrictions     [] Strict Bedrest: [] Remain off Work:     [] May return to full duty work:                                   [] Return to work with restrictions:             Return Appointment:  [x] Wound and dressing supply provider:   [] ECF or Home Healthcare:  [x] Wound Assessment: [x] Physician or NP scheduled for Wound Assessment:   [x] Return Appointment: 1 Week(s)  [] Ordered tests:      Electronically signed Bryan Delarosa LPN on 6/3/6214 at 5:43 PM     Nahomy Mu[address]oz 281: Should you experience any significant changes in your wound(s) or have questions about your wound care, please contact the [facility] Main at [address] [place] [address] 8:00 am - 4:30. If you need help with your wound outside these hours and cannot wait until we are again available, contact your PCP or go to the hospital emergency room. PLEASE NOTE: IF YOU ARE UNABLE TO OBTAIN WOUND SUPPLIES, CONTINUE TO USE THE SUPPLIES YOU HAVE AVAILABLE UNTIL YOU ARE ABLE TO REACH US. IT IS MOST IMPORTANT TO KEEP THE WOUND COVERED AT ALL TIMES.      Physician Signature:_______________________    Date: ___________ Time:  ____________

## 2023-04-10 ENCOUNTER — HOSPITAL ENCOUNTER (INPATIENT)
Facility: HOSPITAL | Age: 53
LOS: 4 days | Discharge: HOME HEALTH | DRG: 854 | End: 2023-04-14
Attending: EMERGENCY MEDICINE | Admitting: FAMILY MEDICINE
Payer: COMMERCIAL

## 2023-04-10 ENCOUNTER — APPOINTMENT (OUTPATIENT)
Facility: HOSPITAL | Age: 53
DRG: 854 | End: 2023-04-10
Payer: COMMERCIAL

## 2023-04-10 DIAGNOSIS — K61.0 PERIANAL ABSCESS: ICD-10-CM

## 2023-04-10 DIAGNOSIS — K61.1 PERIRECTAL ABSCESS: ICD-10-CM

## 2023-04-10 DIAGNOSIS — K61.1 PERI-RECTAL ABSCESS: Primary | ICD-10-CM

## 2023-04-10 PROBLEM — A41.9 SEPSIS (HCC): Status: ACTIVE | Noted: 2023-02-01

## 2023-04-10 LAB
ALBUMIN SERPL-MCNC: 3.2 G/DL (ref 3.4–5)
ALBUMIN/GLOB SERPL: 0.8 (ref 0.8–1.7)
ALP SERPL-CCNC: 88 U/L (ref 45–117)
ALT SERPL-CCNC: 30 U/L (ref 16–61)
ANION GAP SERPL CALC-SCNC: 4 MMOL/L (ref 3–18)
AST SERPL-CCNC: 17 U/L (ref 10–38)
BASOPHILS # BLD: 0 K/UL (ref 0–0.1)
BASOPHILS NFR BLD: 0 % (ref 0–2)
BILIRUB SERPL-MCNC: 0.6 MG/DL (ref 0.2–1)
BUN SERPL-MCNC: 19 MG/DL (ref 7–18)
BUN/CREAT SERPL: 15 (ref 12–20)
CALCIUM SERPL-MCNC: 8.8 MG/DL (ref 8.5–10.1)
CHLORIDE SERPL-SCNC: 107 MMOL/L (ref 100–111)
CO2 SERPL-SCNC: 30 MMOL/L (ref 21–32)
CREAT SERPL-MCNC: 1.3 MG/DL (ref 0.6–1.3)
DIFFERENTIAL METHOD BLD: ABNORMAL
EKG DIAGNOSIS: NORMAL
EKG Q-T INTERVAL: 384 MS
EKG QRS DURATION: 84 MS
EKG QTC CALCULATION (BAZETT): 376 MS
EKG R AXIS: 35 DEGREES
EKG T AXIS: -13 DEGREES
EKG VENTRICULAR RATE: 58 BPM
EOSINOPHIL # BLD: 0.3 K/UL (ref 0–0.4)
EOSINOPHIL NFR BLD: 2 % (ref 0–5)
ERYTHROCYTE [DISTWIDTH] IN BLOOD BY AUTOMATED COUNT: 14.7 % (ref 11.6–14.5)
GLOBULIN SER CALC-MCNC: 4.1 G/DL (ref 2–4)
GLUCOSE SERPL-MCNC: 110 MG/DL (ref 74–99)
HCT VFR BLD AUTO: 42.8 % (ref 36–48)
HGB BLD-MCNC: 13.6 G/DL (ref 13–16)
IMM GRANULOCYTES # BLD AUTO: 0.1 K/UL (ref 0–0.04)
IMM GRANULOCYTES NFR BLD AUTO: 1 % (ref 0–0.5)
LACTATE SERPL-SCNC: 1.2 MMOL/L (ref 0.4–2)
LACTATE SERPL-SCNC: 2.7 MMOL/L (ref 0.4–2)
LYMPHOCYTES # BLD: 1.6 K/UL (ref 0.9–3.6)
LYMPHOCYTES NFR BLD: 11 % (ref 21–52)
MCH RBC QN AUTO: 28.3 PG (ref 24–34)
MCHC RBC AUTO-ENTMCNC: 31.8 G/DL (ref 31–37)
MCV RBC AUTO: 89 FL (ref 78–100)
MONOCYTES # BLD: 1.5 K/UL (ref 0.05–1.2)
MONOCYTES NFR BLD: 10 % (ref 3–10)
NEUTS SEG # BLD: 11.5 K/UL (ref 1.8–8)
NEUTS SEG NFR BLD: 77 % (ref 40–73)
NRBC # BLD: 0 K/UL (ref 0–0.01)
NRBC BLD-RTO: 0 PER 100 WBC
PLATELET # BLD AUTO: 294 K/UL (ref 135–420)
PMV BLD AUTO: 10.3 FL (ref 9.2–11.8)
POTASSIUM SERPL-SCNC: 4.5 MMOL/L (ref 3.5–5.5)
PROT SERPL-MCNC: 7.3 G/DL (ref 6.4–8.2)
RBC # BLD AUTO: 4.81 M/UL (ref 4.35–5.65)
SODIUM SERPL-SCNC: 141 MMOL/L (ref 136–145)
WBC # BLD AUTO: 14.9 K/UL (ref 4.6–13.2)

## 2023-04-10 PROCEDURE — 1100000000 HC RM PRIVATE

## 2023-04-10 PROCEDURE — 74177 CT ABD & PELVIS W/CONTRAST: CPT

## 2023-04-10 PROCEDURE — 99285 EMERGENCY DEPT VISIT HI MDM: CPT

## 2023-04-10 PROCEDURE — 96375 TX/PRO/DX INJ NEW DRUG ADDON: CPT

## 2023-04-10 PROCEDURE — 6370000000 HC RX 637 (ALT 250 FOR IP): Performed by: NURSE PRACTITIONER

## 2023-04-10 PROCEDURE — 2580000003 HC RX 258: Performed by: FAMILY MEDICINE

## 2023-04-10 PROCEDURE — 93005 ELECTROCARDIOGRAM TRACING: CPT | Performed by: EMERGENCY MEDICINE

## 2023-04-10 PROCEDURE — 87205 SMEAR GRAM STAIN: CPT

## 2023-04-10 PROCEDURE — 6360000004 HC RX CONTRAST MEDICATION: Performed by: NURSE PRACTITIONER

## 2023-04-10 PROCEDURE — 6360000002 HC RX W HCPCS: Performed by: NURSE PRACTITIONER

## 2023-04-10 PROCEDURE — 96366 THER/PROPH/DIAG IV INF ADDON: CPT

## 2023-04-10 PROCEDURE — 6360000002 HC RX W HCPCS: Performed by: FAMILY MEDICINE

## 2023-04-10 PROCEDURE — 2580000003 HC RX 258: Performed by: NURSE PRACTITIONER

## 2023-04-10 PROCEDURE — 96365 THER/PROPH/DIAG IV INF INIT: CPT

## 2023-04-10 PROCEDURE — 80053 COMPREHEN METABOLIC PANEL: CPT

## 2023-04-10 PROCEDURE — 85025 COMPLETE CBC W/AUTO DIFF WBC: CPT

## 2023-04-10 PROCEDURE — 83605 ASSAY OF LACTIC ACID: CPT

## 2023-04-10 PROCEDURE — 87040 BLOOD CULTURE FOR BACTERIA: CPT

## 2023-04-10 RX ORDER — ROSUVASTATIN CALCIUM 10 MG/1
40 TABLET, COATED ORAL NIGHTLY
Status: DISCONTINUED | OUTPATIENT
Start: 2023-04-11 | End: 2023-04-13

## 2023-04-10 RX ORDER — ENOXAPARIN SODIUM 100 MG/ML
40 INJECTION SUBCUTANEOUS EVERY 24 HOURS
Status: DISCONTINUED | OUTPATIENT
Start: 2023-04-11 | End: 2023-04-14

## 2023-04-10 RX ORDER — MORPHINE SULFATE 4 MG/ML
4 INJECTION, SOLUTION INTRAMUSCULAR; INTRAVENOUS
Status: COMPLETED | OUTPATIENT
Start: 2023-04-10 | End: 2023-04-10

## 2023-04-10 RX ORDER — HYDROMORPHONE HYDROCHLORIDE 1 MG/ML
1 INJECTION, SOLUTION INTRAMUSCULAR; INTRAVENOUS; SUBCUTANEOUS EVERY 4 HOURS PRN
Status: DISCONTINUED | OUTPATIENT
Start: 2023-04-10 | End: 2023-04-14

## 2023-04-10 RX ORDER — OXYCODONE HYDROCHLORIDE 5 MG/1
10 TABLET ORAL EVERY 6 HOURS PRN
Status: DISCONTINUED | OUTPATIENT
Start: 2023-04-10 | End: 2023-04-14

## 2023-04-10 RX ORDER — ONDANSETRON 2 MG/ML
4 INJECTION INTRAMUSCULAR; INTRAVENOUS EVERY 6 HOURS PRN
Status: DISCONTINUED | OUTPATIENT
Start: 2023-04-10 | End: 2023-04-14

## 2023-04-10 RX ORDER — ACETAMINOPHEN 325 MG/1
650 TABLET ORAL EVERY 6 HOURS PRN
Status: DISCONTINUED | OUTPATIENT
Start: 2023-04-10 | End: 2023-04-14 | Stop reason: HOSPADM

## 2023-04-10 RX ORDER — POLYETHYLENE GLYCOL 3350 17 G/17G
17 POWDER, FOR SOLUTION ORAL DAILY PRN
Status: DISCONTINUED | OUTPATIENT
Start: 2023-04-10 | End: 2023-04-14 | Stop reason: HOSPADM

## 2023-04-10 RX ORDER — INSULIN LISPRO 100 [IU]/ML
0-4 INJECTION, SOLUTION INTRAVENOUS; SUBCUTANEOUS
Status: DISCONTINUED | OUTPATIENT
Start: 2023-04-10 | End: 2023-04-14 | Stop reason: HOSPADM

## 2023-04-10 RX ORDER — LATANOPROST 50 UG/ML
1 SOLUTION/ DROPS OPHTHALMIC NIGHTLY
Status: DISCONTINUED | OUTPATIENT
Start: 2023-04-11 | End: 2023-04-14 | Stop reason: HOSPADM

## 2023-04-10 RX ORDER — EZETIMIBE 10 MG/1
10 TABLET ORAL NIGHTLY
Status: DISCONTINUED | OUTPATIENT
Start: 2023-04-11 | End: 2023-04-14 | Stop reason: HOSPADM

## 2023-04-10 RX ORDER — SODIUM CHLORIDE 0.9 % (FLUSH) 0.9 %
5-40 SYRINGE (ML) INJECTION EVERY 12 HOURS SCHEDULED
Status: DISCONTINUED | OUTPATIENT
Start: 2023-04-10 | End: 2023-04-14 | Stop reason: HOSPADM

## 2023-04-10 RX ORDER — DORZOLAMIDE HYDROCHLORIDE AND TIMOLOL MALEATE 20; 5 MG/ML; MG/ML
1 SOLUTION/ DROPS OPHTHALMIC 2 TIMES DAILY
Status: DISCONTINUED | OUTPATIENT
Start: 2023-04-11 | End: 2023-04-14 | Stop reason: HOSPADM

## 2023-04-10 RX ORDER — SODIUM CHLORIDE 0.9 % (FLUSH) 0.9 %
5-40 SYRINGE (ML) INJECTION PRN
Status: DISCONTINUED | OUTPATIENT
Start: 2023-04-10 | End: 2023-04-14 | Stop reason: HOSPADM

## 2023-04-10 RX ORDER — SODIUM CHLORIDE 9 MG/ML
INJECTION, SOLUTION INTRAVENOUS PRN
Status: DISCONTINUED | OUTPATIENT
Start: 2023-04-10 | End: 2023-04-14 | Stop reason: HOSPADM

## 2023-04-10 RX ORDER — LIDOCAINE HYDROCHLORIDE 20 MG/ML
JELLY TOPICAL
Status: COMPLETED | OUTPATIENT
Start: 2023-04-10 | End: 2023-04-10

## 2023-04-10 RX ORDER — 0.9 % SODIUM CHLORIDE 0.9 %
1000 INTRAVENOUS SOLUTION INTRAVENOUS ONCE
Status: COMPLETED | OUTPATIENT
Start: 2023-04-10 | End: 2023-04-10

## 2023-04-10 RX ORDER — ONDANSETRON 2 MG/ML
4 INJECTION INTRAMUSCULAR; INTRAVENOUS
Status: COMPLETED | OUTPATIENT
Start: 2023-04-10 | End: 2023-04-10

## 2023-04-10 RX ORDER — ACETAMINOPHEN 650 MG/1
650 SUPPOSITORY RECTAL EVERY 6 HOURS PRN
Status: DISCONTINUED | OUTPATIENT
Start: 2023-04-10 | End: 2023-04-14 | Stop reason: HOSPADM

## 2023-04-10 RX ORDER — BUPROPION HYDROCHLORIDE 150 MG/1
300 TABLET ORAL DAILY
Status: DISCONTINUED | OUTPATIENT
Start: 2023-04-11 | End: 2023-04-14 | Stop reason: HOSPADM

## 2023-04-10 RX ORDER — ONDANSETRON 4 MG/1
4 TABLET, ORALLY DISINTEGRATING ORAL EVERY 8 HOURS PRN
Status: DISCONTINUED | OUTPATIENT
Start: 2023-04-10 | End: 2023-04-14 | Stop reason: HOSPADM

## 2023-04-10 RX ADMIN — ONDANSETRON 4 MG: 2 INJECTION INTRAMUSCULAR; INTRAVENOUS at 21:10

## 2023-04-10 RX ADMIN — MORPHINE SULFATE 4 MG: 4 INJECTION INTRAVENOUS at 21:11

## 2023-04-10 RX ADMIN — VANCOMYCIN HYDROCHLORIDE 2000 MG: 10 INJECTION, POWDER, LYOPHILIZED, FOR SOLUTION INTRAVENOUS at 23:42

## 2023-04-10 RX ADMIN — PIPERACILLIN AND TAZOBACTAM 3375 MG: 3; .375 INJECTION, POWDER, LYOPHILIZED, FOR SOLUTION INTRAVENOUS at 19:02

## 2023-04-10 RX ADMIN — IOPAMIDOL 100 ML: 612 INJECTION, SOLUTION INTRAVENOUS at 19:11

## 2023-04-10 RX ADMIN — SODIUM CHLORIDE 1000 ML: 9 INJECTION, SOLUTION INTRAVENOUS at 19:01

## 2023-04-10 RX ADMIN — LIDOCAINE HYDROCHLORIDE: 20 JELLY TOPICAL at 18:07

## 2023-04-10 ASSESSMENT — PAIN SCALES - GENERAL: PAINLEVEL_OUTOF10: 0

## 2023-04-10 NOTE — ED PROVIDER NOTES
THE [facility] [address] ENCOUNTER       Pt Name: Yobany Prieto  MRN: [id number]  [place] 1970  Date of evaluation: 4/10/2023  PCP: Chris Cheema MD  Note Started: 10:33 PM 4/10/23     CHIEF COMPLAINT       Chief Complaint   Patient presents with    Hypotension        HISTORY OF PRESENT ILLNESS: 1 or more elements      History From: Patient  HPI Limitations: None  Chronic Conditions: Type 2 diabetes, hypertension, hypercholesteremia, cellulitis, VLADIMIR  Social Determinants affecting Dx or Tx: none    Yobany Prieto is a 46 y.o. male with a history of type 2 diabetes, pulmonary hypertension, PABLO, VLADIMIR, hypertension, hypercholesteremia, cellulitis, sepsis who presents to ED c/o anal pain that began 3 days ago. Patient has known wound to his right thigh and is being seen by Dr. Iqra Nguyen for ongoing wound care 2x a week. Patient was admitted for sepsis secondary to cellulitis in February. Patient reports \"a softball in my ass\" that began 3 days ago. Patient also notes low blood pressure readings at home, lowest reading of 88 systolic. Patient expresses concerns due to previous admit for sepsis in February, patient states \"I never had a fever that time, by the time I got admitted I almost did not make it. \"  Patient denies measured fever at this time, does report cold sweats. Patient denies headache, chest pain, nausea/vomiting, shortness of breath. Patient denies known worsening of wound to right thigh, dressing clean dry and intact at this time. Patient began taking p.o. cefuroxime on Friday. Nursing Notes were all reviewed and agreed with or any disagreements were addressed in the HPI.     PAST HISTORY     Past Medical History:  Past Medical History:   Diagnosis Date    Arthritis     spine    Chronic pain     lumbar    Diabetes mellitus (HCC)     GERD (gastroesophageal reflux disease)     History of blood transfusion     Hx of blood clots     Hypertension     Sleep apnea

## 2023-04-11 PROBLEM — K61.1 PERIRECTAL ABSCESS: Status: ACTIVE | Noted: 2023-04-11

## 2023-04-11 LAB
ALBUMIN SERPL-MCNC: 3 G/DL (ref 3.4–5)
ALBUMIN/GLOB SERPL: 0.8 (ref 0.8–1.7)
ALP SERPL-CCNC: 79 U/L (ref 45–117)
ALT SERPL-CCNC: 29 U/L (ref 16–61)
ANION GAP SERPL CALC-SCNC: 3 MMOL/L (ref 3–18)
APPEARANCE UR: CLEAR
AST SERPL-CCNC: 19 U/L (ref 10–38)
BACTERIA URNS QL MICRO: NEGATIVE /HPF
BASOPHILS # BLD: 0 K/UL (ref 0–0.1)
BASOPHILS NFR BLD: 0 % (ref 0–2)
BILIRUB SERPL-MCNC: 0.7 MG/DL (ref 0.2–1)
BILIRUB UR QL: NEGATIVE
BUN SERPL-MCNC: 15 MG/DL (ref 7–18)
BUN/CREAT SERPL: 13 (ref 12–20)
CALCIUM SERPL-MCNC: 8.5 MG/DL (ref 8.5–10.1)
CHLORIDE SERPL-SCNC: 107 MMOL/L (ref 100–111)
CO2 SERPL-SCNC: 28 MMOL/L (ref 21–32)
COLOR UR: YELLOW
CREAT SERPL-MCNC: 1.13 MG/DL (ref 0.6–1.3)
DIFFERENTIAL METHOD BLD: ABNORMAL
EOSINOPHIL # BLD: 0.3 K/UL (ref 0–0.4)
EOSINOPHIL NFR BLD: 3 % (ref 0–5)
EPITH CASTS URNS QL MICRO: NEGATIVE /LPF (ref 0–5)
ERYTHROCYTE [DISTWIDTH] IN BLOOD BY AUTOMATED COUNT: 14.8 % (ref 11.6–14.5)
GLOBULIN SER CALC-MCNC: 3.9 G/DL (ref 2–4)
GLUCOSE BLD STRIP.AUTO-MCNC: 109 MG/DL (ref 70–110)
GLUCOSE BLD STRIP.AUTO-MCNC: 113 MG/DL (ref 70–110)
GLUCOSE BLD STRIP.AUTO-MCNC: 159 MG/DL (ref 70–110)
GLUCOSE BLD STRIP.AUTO-MCNC: 171 MG/DL (ref 70–110)
GLUCOSE BLD STRIP.AUTO-MCNC: 218 MG/DL (ref 70–110)
GLUCOSE SERPL-MCNC: 104 MG/DL (ref 74–99)
GLUCOSE UR STRIP.AUTO-MCNC: NEGATIVE MG/DL
HCT VFR BLD AUTO: 40.5 % (ref 36–48)
HGB BLD-MCNC: 12.6 G/DL (ref 13–16)
HGB UR QL STRIP: NEGATIVE
IMM GRANULOCYTES # BLD AUTO: 0.1 K/UL (ref 0–0.04)
IMM GRANULOCYTES NFR BLD AUTO: 0 % (ref 0–0.5)
KETONES UR QL STRIP.AUTO: NEGATIVE MG/DL
LACTATE SERPL-SCNC: 0.9 MMOL/L (ref 0.4–2)
LEUKOCYTE ESTERASE UR QL STRIP.AUTO: NEGATIVE
LYMPHOCYTES # BLD: 1.7 K/UL (ref 0.9–3.6)
LYMPHOCYTES NFR BLD: 14 % (ref 21–52)
MCH RBC QN AUTO: 28.3 PG (ref 24–34)
MCHC RBC AUTO-ENTMCNC: 31.1 G/DL (ref 31–37)
MCV RBC AUTO: 90.8 FL (ref 78–100)
MONOCYTES # BLD: 1.2 K/UL (ref 0.05–1.2)
MONOCYTES NFR BLD: 10 % (ref 3–10)
NEUTS SEG # BLD: 8.8 K/UL (ref 1.8–8)
NEUTS SEG NFR BLD: 73 % (ref 40–73)
NITRITE UR QL STRIP.AUTO: NEGATIVE
NRBC # BLD: 0 K/UL (ref 0–0.01)
NRBC BLD-RTO: 0 PER 100 WBC
PH UR STRIP: 6.5 (ref 5–8)
PLATELET # BLD AUTO: 250 K/UL (ref 135–420)
PMV BLD AUTO: 10.4 FL (ref 9.2–11.8)
POTASSIUM SERPL-SCNC: 4.8 MMOL/L (ref 3.5–5.5)
PROT SERPL-MCNC: 6.9 G/DL (ref 6.4–8.2)
PROT UR STRIP-MCNC: ABNORMAL MG/DL
RBC # BLD AUTO: 4.46 M/UL (ref 4.35–5.65)
RBC #/AREA URNS HPF: NORMAL /HPF (ref 0–5)
SODIUM SERPL-SCNC: 138 MMOL/L (ref 136–145)
SP GR UR REFRACTOMETRY: >1.03 (ref 1–1.03)
UROBILINOGEN UR QL STRIP.AUTO: 0.2 EU/DL (ref 0.2–1)
WBC # BLD AUTO: 12.1 K/UL (ref 4.6–13.2)
WBC URNS QL MICRO: NORMAL /HPF (ref 0–5)

## 2023-04-11 PROCEDURE — 6360000002 HC RX W HCPCS: Performed by: FAMILY MEDICINE

## 2023-04-11 PROCEDURE — 85025 COMPLETE CBC W/AUTO DIFF WBC: CPT

## 2023-04-11 PROCEDURE — 80053 COMPREHEN METABOLIC PANEL: CPT

## 2023-04-11 PROCEDURE — 36415 COLL VENOUS BLD VENIPUNCTURE: CPT

## 2023-04-11 PROCEDURE — 83605 ASSAY OF LACTIC ACID: CPT

## 2023-04-11 PROCEDURE — 82962 GLUCOSE BLOOD TEST: CPT

## 2023-04-11 PROCEDURE — 2580000003 HC RX 258: Performed by: FAMILY MEDICINE

## 2023-04-11 PROCEDURE — 6370000000 HC RX 637 (ALT 250 FOR IP): Performed by: FAMILY MEDICINE

## 2023-04-11 PROCEDURE — 6360000002 HC RX W HCPCS: Performed by: INTERNAL MEDICINE

## 2023-04-11 PROCEDURE — 1100000000 HC RM PRIVATE

## 2023-04-11 PROCEDURE — 81001 URINALYSIS AUTO W/SCOPE: CPT

## 2023-04-11 PROCEDURE — 2580000003 HC RX 258: Performed by: INTERNAL MEDICINE

## 2023-04-11 RX ORDER — SILDENAFIL 100 MG/1
100 TABLET, FILM COATED ORAL 2 TIMES DAILY
COMMUNITY

## 2023-04-11 RX ORDER — OXYCODONE AND ACETAMINOPHEN 10; 325 MG/1; MG/1
1 TABLET ORAL EVERY 6 HOURS PRN
Status: ON HOLD | COMMUNITY
End: 2023-04-14 | Stop reason: SDUPTHER

## 2023-04-11 RX ORDER — EZETIMIBE 10 MG/1
10 TABLET ORAL DAILY
COMMUNITY

## 2023-04-11 RX ORDER — VERAPAMIL HYDROCHLORIDE 120 MG/1
120 CAPSULE, EXTENDED RELEASE ORAL 3 TIMES DAILY
COMMUNITY

## 2023-04-11 RX ORDER — ROSUVASTATIN CALCIUM 40 MG/1
40 TABLET, COATED ORAL EVERY EVENING
COMMUNITY

## 2023-04-11 RX ORDER — DORZOLAMIDE HYDROCHLORIDE AND TIMOLOL MALEATE 20; 5 MG/ML; MG/ML
1 SOLUTION/ DROPS OPHTHALMIC 2 TIMES DAILY
COMMUNITY

## 2023-04-11 RX ORDER — BUPROPION HYDROCHLORIDE 300 MG/1
300 TABLET ORAL DAILY
COMMUNITY

## 2023-04-11 RX ORDER — LATANOPROST 50 UG/ML
1 SOLUTION/ DROPS OPHTHALMIC NIGHTLY
COMMUNITY

## 2023-04-11 RX ORDER — METOPROLOL TARTRATE 100 MG/1
100 TABLET ORAL 2 TIMES DAILY
COMMUNITY

## 2023-04-11 RX ORDER — SODIUM CHLORIDE 9 MG/ML
INJECTION, SOLUTION INTRAVENOUS CONTINUOUS
Status: DISCONTINUED | OUTPATIENT
Start: 2023-04-11 | End: 2023-04-14

## 2023-04-11 RX ADMIN — VANCOMYCIN HYDROCHLORIDE 1000 MG: 1 INJECTION, POWDER, LYOPHILIZED, FOR SOLUTION INTRAVENOUS at 11:34

## 2023-04-11 RX ADMIN — HYDROMORPHONE HYDROCHLORIDE 1 MG: 1 INJECTION, SOLUTION INTRAMUSCULAR; INTRAVENOUS; SUBCUTANEOUS at 00:13

## 2023-04-11 RX ADMIN — ONDANSETRON 4 MG: 4 TABLET, ORALLY DISINTEGRATING ORAL at 09:05

## 2023-04-11 RX ADMIN — VANCOMYCIN HYDROCHLORIDE 1000 MG: 1 INJECTION, POWDER, LYOPHILIZED, FOR SOLUTION INTRAVENOUS at 22:51

## 2023-04-11 RX ADMIN — EZETIMIBE 10 MG: 10 TABLET ORAL at 21:58

## 2023-04-11 RX ADMIN — DORZOLAMIDE HYDROCHLORIDE AND TIMOLOL MALEATE 1 DROP: 20; 5 SOLUTION/ DROPS OPHTHALMIC at 00:13

## 2023-04-11 RX ADMIN — HYDROMORPHONE HYDROCHLORIDE 1 MG: 1 INJECTION, SOLUTION INTRAMUSCULAR; INTRAVENOUS; SUBCUTANEOUS at 22:06

## 2023-04-11 RX ADMIN — BUPROPION HYDROCHLORIDE 300 MG: 150 TABLET, EXTENDED RELEASE ORAL at 09:04

## 2023-04-11 RX ADMIN — EZETIMIBE 10 MG: 10 TABLET ORAL at 00:12

## 2023-04-11 RX ADMIN — LATANOPROST 1 DROP: 50 SOLUTION OPHTHALMIC at 22:21

## 2023-04-11 RX ADMIN — OXYCODONE 10 MG: 5 TABLET ORAL at 19:26

## 2023-04-11 RX ADMIN — OXYCODONE 10 MG: 5 TABLET ORAL at 07:12

## 2023-04-11 RX ADMIN — HYDROMORPHONE HYDROCHLORIDE 1 MG: 1 INJECTION, SOLUTION INTRAMUSCULAR; INTRAVENOUS; SUBCUTANEOUS at 04:09

## 2023-04-11 RX ADMIN — HYDROMORPHONE HYDROCHLORIDE 1 MG: 1 INJECTION, SOLUTION INTRAMUSCULAR; INTRAVENOUS; SUBCUTANEOUS at 09:18

## 2023-04-11 RX ADMIN — SODIUM CHLORIDE, PRESERVATIVE FREE 10 ML: 5 INJECTION INTRAVENOUS at 00:15

## 2023-04-11 RX ADMIN — HYDROMORPHONE HYDROCHLORIDE 1 MG: 1 INJECTION, SOLUTION INTRAMUSCULAR; INTRAVENOUS; SUBCUTANEOUS at 16:27

## 2023-04-11 RX ADMIN — SODIUM CHLORIDE 3000 MG: 900 INJECTION INTRAVENOUS at 22:09

## 2023-04-11 RX ADMIN — ONDANSETRON 4 MG: 4 TABLET, ORALLY DISINTEGRATING ORAL at 22:55

## 2023-04-11 RX ADMIN — SODIUM CHLORIDE: 9 INJECTION, SOLUTION INTRAVENOUS at 22:22

## 2023-04-11 RX ADMIN — OXYCODONE 10 MG: 5 TABLET ORAL at 01:18

## 2023-04-11 RX ADMIN — ONDANSETRON 4 MG: 4 TABLET, ORALLY DISINTEGRATING ORAL at 18:36

## 2023-04-11 RX ADMIN — DORZOLAMIDE HYDROCHLORIDE AND TIMOLOL MALEATE 1 DROP: 20; 5 SOLUTION/ DROPS OPHTHALMIC at 09:07

## 2023-04-11 RX ADMIN — LATANOPROST 1 DROP: 50 SOLUTION OPHTHALMIC at 00:13

## 2023-04-11 RX ADMIN — DORZOLAMIDE HYDROCHLORIDE AND TIMOLOL MALEATE 1 DROP: 20; 5 SOLUTION/ DROPS OPHTHALMIC at 22:05

## 2023-04-11 RX ADMIN — ROSUVASTATIN CALCIUM 40 MG: 10 TABLET, FILM COATED ORAL at 00:12

## 2023-04-11 RX ADMIN — OXYCODONE 10 MG: 5 TABLET ORAL at 13:10

## 2023-04-11 RX ADMIN — SODIUM CHLORIDE, PRESERVATIVE FREE 10 ML: 5 INJECTION INTRAVENOUS at 22:08

## 2023-04-11 RX ADMIN — ENOXAPARIN SODIUM 40 MG: 100 INJECTION SUBCUTANEOUS at 21:59

## 2023-04-11 RX ADMIN — SODIUM CHLORIDE 3000 MG: 900 INJECTION INTRAVENOUS at 13:27

## 2023-04-11 RX ADMIN — INSULIN LISPRO 1 UNITS: 100 INJECTION, SOLUTION INTRAVENOUS; SUBCUTANEOUS at 22:16

## 2023-04-11 RX ADMIN — SODIUM CHLORIDE: 9 INJECTION, SOLUTION INTRAVENOUS at 01:12

## 2023-04-11 RX ADMIN — ROSUVASTATIN CALCIUM 40 MG: 10 TABLET, FILM COATED ORAL at 21:58

## 2023-04-11 ASSESSMENT — PAIN - FUNCTIONAL ASSESSMENT
PAIN_FUNCTIONAL_ASSESSMENT: ACTIVITIES ARE NOT PREVENTED

## 2023-04-11 ASSESSMENT — PAIN SCALES - GENERAL
PAINLEVEL_OUTOF10: 7
PAINLEVEL_OUTOF10: 0
PAINLEVEL_OUTOF10: 9
PAINLEVEL_OUTOF10: 6
PAINLEVEL_OUTOF10: 6
PAINLEVEL_OUTOF10: 2
PAINLEVEL_OUTOF10: 10
PAINLEVEL_OUTOF10: 3
PAINLEVEL_OUTOF10: 10
PAINLEVEL_OUTOF10: 3
PAINLEVEL_OUTOF10: 9
PAINLEVEL_OUTOF10: 9
PAINLEVEL_OUTOF10: 8

## 2023-04-11 ASSESSMENT — PAIN DESCRIPTION - DESCRIPTORS
DESCRIPTORS: ACHING
DESCRIPTORS: JABBING
DESCRIPTORS: JABBING
DESCRIPTORS: ACHING;BURNING
DESCRIPTORS: JABBING
DESCRIPTORS: JABBING
DESCRIPTORS: ACHING;BURNING
DESCRIPTORS: ACHING;BURNING
DESCRIPTORS: ACHING;BURNING;DISCOMFORT

## 2023-04-11 ASSESSMENT — PAIN DESCRIPTION - LOCATION
LOCATION: RECTUM
LOCATION: BACK
LOCATION: RECTUM

## 2023-04-11 ASSESSMENT — PAIN DESCRIPTION - ORIENTATION
ORIENTATION: INNER;OUTER
ORIENTATION: POSTERIOR
ORIENTATION: INNER;OUTER

## 2023-04-11 NOTE — CONSULTS
[facility]  (A Division of [address] [place] [address])                                                           Date of Admission: 4/10/2023   Date of Note: 4/11/2023  Reason for Consult:   Referring MD:     Thank you for involving me in the care of this patient. Please do not hesitate to contact me on the above number if question or concern. Current Antimicrobials:    Prior Antimicrobials:  Zosyn X1 dose  Vancomycin IV 4/10 to date   Clindamycin 2/1  Ceftriaxone / Zosyn/Cefuroxime x3 weeks total- ended in Feb 2023   Allergy to antibiotics:     Assessment--ID related:     Perirectal abscess- 2-3 days duration  Right thigh wound from prior abscess in Feb-- under wound care in clinic  Pre-diabetes    Active Hospital Problems    Diagnosis Date Noted    Pulmonary hypertension ([facility] [address] [I27.20] 03/09/2023     Priority: Medium    Open wound of right thigh [S71.101A] 02/16/2023     Priority: Medium    Perirectal abscess [K61.1] 04/11/2023    Perirectal cellulitis [K61.1] 04/10/2023    HTN (hypertension) [I10] 02/08/2023    Controlled type 2 diabetes mellitus, without long-term current use of insulin ([address] [E11.9] 02/02/2023    VLADIMIR (obstructive sleep apnea) [G47.33] 02/01/2023    Sepsis ([address] [A41.9] 02/01/2023    BMI 45.0-49.9, adult ([address] [Z68.42] 02/01/2023          Recommendation -- ID related:     Cont with vancomycin--pharmacy to dose. Follow for renal toxicity closely  Resume broad coverage for GNR/anaerobes with Unasyn  Surgical consult- for I and D- please send off pus for culture  FU blood culture, wound culture  Check A1C  Check MRSA screen         HPI:      Patient well known to me from prior ID care/wound care     Riley Benton is a 46 y.o. male with hypertension, GERD, sleep apnea ,Pul hypertension and Obese.  He had right thigh abscess/severe sepsis and PABLO that required ICU admission in Feb 2023, underwent I and D of abscess by Dr Pavan Lopez. He was treated with IV

## 2023-04-11 NOTE — H&P
History & Physical    Patient: Charlotte Olmos MRN: [id number]  CSN: [id number]    YOB: 1970  Age: 46 y.o. Sex: male      DOA: 4/10/2023  Primary Care Provider:  Penny Guzman MD      Assessment/Plan     Active Hospital Problems    Diagnosis Date Noted    Pulmonary hypertension ([facility] [address] [I27.20] 03/09/2023     Priority: Medium    Open wound of right thigh [S71.101A] 02/16/2023     Priority: Medium    Perirectal abscess [K61.1] 04/11/2023    Perirectal cellulitis [K61.1] 04/10/2023    HTN (hypertension) [I10] 02/08/2023    Controlled type 2 diabetes mellitus, without long-term current use of insulin ([facility] [address] [E11.9] 02/02/2023    VLADIMIR (obstructive sleep apnea) [G47.33] 02/01/2023    Sepsis ([facility] [address] [A41.9] 02/01/2023    BMI 45.0-49.9, adult [facility]) [Z68.42] 02/01/2023     45 y/o male with morbid obesity, HTN, VLADIMIR, pulmonary hypertension, and recent hospital stay for right thigh infection is admitted for sepsis due to perirectal abscess and cellulitis.     Sepsis due to perirectal abscess with cellulitis  --Antibiotic treatment with zosyn/vancomycin  --Trend lactic acid  --Follow up blood and wound cultures  --NPO after midnight  --Dr. Mary Eastman will take to OR tomorrow for I/D and exploration  --Infectious disease consult given second deep space infection in 2 months  --Check urinalysis    Open wound of the right thigh  --Wound care consult    VLADIMIR-dose not have CPAP at home  --RT consult to fit for CPAP  --Case management consult to assist with obtaining DME    Type 2 diabetes mellitus  --Sliding scale insulin  --Diabetic diet    HTN  --Hold antihypertensives for now given sepsis    Pulmonary hypertension  --Hold sildenafil for now    BMI 48.7  --Aggressive lifestyle modification needed    Full code    Prophylaxis: lovenox SQ    Estimated length of stay : 3 nights    Goble Lundborg,

## 2023-04-11 NOTE — PLAN OF CARE
Pt declined cpap, pt would like to speak with MD about other options first. RN aware, pt not in distress will continue to monitor.

## 2023-04-12 LAB
ALBUMIN SERPL-MCNC: 2.8 G/DL (ref 3.4–5)
ALBUMIN/GLOB SERPL: 0.6 (ref 0.8–1.7)
ALP SERPL-CCNC: 81 U/L (ref 45–117)
ALT SERPL-CCNC: 40 U/L (ref 16–61)
ANION GAP SERPL CALC-SCNC: 3 MMOL/L (ref 3–18)
AST SERPL-CCNC: 28 U/L (ref 10–38)
BACTERIA SPEC CULT: NORMAL
BACTERIA SPEC CULT: NORMAL
BASOPHILS # BLD: 0 K/UL (ref 0–0.1)
BASOPHILS NFR BLD: 1 % (ref 0–2)
BILIRUB SERPL-MCNC: 0.5 MG/DL (ref 0.2–1)
BUN SERPL-MCNC: 14 MG/DL (ref 7–18)
BUN/CREAT SERPL: 14 (ref 12–20)
CALCIUM SERPL-MCNC: 8.4 MG/DL (ref 8.5–10.1)
CHLORIDE SERPL-SCNC: 110 MMOL/L (ref 100–111)
CO2 SERPL-SCNC: 26 MMOL/L (ref 21–32)
CREAT SERPL-MCNC: 1.03 MG/DL (ref 0.6–1.3)
DIFFERENTIAL METHOD BLD: ABNORMAL
EOSINOPHIL # BLD: 0.3 K/UL (ref 0–0.4)
EOSINOPHIL NFR BLD: 5 % (ref 0–5)
ERYTHROCYTE [DISTWIDTH] IN BLOOD BY AUTOMATED COUNT: 14.7 % (ref 11.6–14.5)
EST. AVERAGE GLUCOSE BLD GHB EST-MCNC: 137 MG/DL
GLOBULIN SER CALC-MCNC: 4.6 G/DL (ref 2–4)
GLUCOSE BLD STRIP.AUTO-MCNC: 107 MG/DL (ref 70–110)
GLUCOSE BLD STRIP.AUTO-MCNC: 137 MG/DL (ref 70–110)
GLUCOSE BLD STRIP.AUTO-MCNC: 146 MG/DL (ref 70–110)
GLUCOSE BLD STRIP.AUTO-MCNC: 168 MG/DL (ref 70–110)
GLUCOSE SERPL-MCNC: 165 MG/DL (ref 74–99)
HBA1C MFR BLD: 6.4 % (ref 4.2–5.6)
HCT VFR BLD AUTO: 40.4 % (ref 36–48)
HGB BLD-MCNC: 12.6 G/DL (ref 13–16)
IMM GRANULOCYTES # BLD AUTO: 0 K/UL (ref 0–0.04)
IMM GRANULOCYTES NFR BLD AUTO: 1 % (ref 0–0.5)
LYMPHOCYTES # BLD: 1.2 K/UL (ref 0.9–3.6)
LYMPHOCYTES NFR BLD: 20 % (ref 21–52)
MCH RBC QN AUTO: 28.1 PG (ref 24–34)
MCHC RBC AUTO-ENTMCNC: 31.2 G/DL (ref 31–37)
MCV RBC AUTO: 90 FL (ref 78–100)
MONOCYTES # BLD: 0.5 K/UL (ref 0.05–1.2)
MONOCYTES NFR BLD: 9 % (ref 3–10)
NEUTS SEG # BLD: 4 K/UL (ref 1.8–8)
NEUTS SEG NFR BLD: 65 % (ref 40–73)
NRBC # BLD: 0 K/UL (ref 0–0.01)
NRBC BLD-RTO: 0 PER 100 WBC
PLATELET # BLD AUTO: 244 K/UL (ref 135–420)
PMV BLD AUTO: 10.2 FL (ref 9.2–11.8)
POTASSIUM SERPL-SCNC: 4 MMOL/L (ref 3.5–5.5)
PROT SERPL-MCNC: 7.4 G/DL (ref 6.4–8.2)
RBC # BLD AUTO: 4.49 M/UL (ref 4.35–5.65)
SERVICE CMNT-IMP: NORMAL
SODIUM SERPL-SCNC: 139 MMOL/L (ref 136–145)
VANCOMYCIN SERPL-MCNC: 19 UG/ML (ref 5–40)
WBC # BLD AUTO: 6.1 K/UL (ref 4.6–13.2)

## 2023-04-12 PROCEDURE — 80202 ASSAY OF VANCOMYCIN: CPT

## 2023-04-12 PROCEDURE — 80053 COMPREHEN METABOLIC PANEL: CPT

## 2023-04-12 PROCEDURE — 36415 COLL VENOUS BLD VENIPUNCTURE: CPT

## 2023-04-12 PROCEDURE — 2580000003 HC RX 258: Performed by: INTERNAL MEDICINE

## 2023-04-12 PROCEDURE — 6360000002 HC RX W HCPCS: Performed by: INTERNAL MEDICINE

## 2023-04-12 PROCEDURE — 85025 COMPLETE CBC W/AUTO DIFF WBC: CPT

## 2023-04-12 PROCEDURE — 6370000000 HC RX 637 (ALT 250 FOR IP): Performed by: FAMILY MEDICINE

## 2023-04-12 PROCEDURE — 2580000003 HC RX 258: Performed by: FAMILY MEDICINE

## 2023-04-12 PROCEDURE — 6360000002 HC RX W HCPCS: Performed by: FAMILY MEDICINE

## 2023-04-12 PROCEDURE — 1100000000 HC RM PRIVATE

## 2023-04-12 PROCEDURE — 83036 HEMOGLOBIN GLYCOSYLATED A1C: CPT

## 2023-04-12 PROCEDURE — 82962 GLUCOSE BLOOD TEST: CPT

## 2023-04-12 RX ADMIN — ENOXAPARIN SODIUM 40 MG: 100 INJECTION SUBCUTANEOUS at 20:42

## 2023-04-12 RX ADMIN — LATANOPROST 1 DROP: 50 SOLUTION OPHTHALMIC at 21:05

## 2023-04-12 RX ADMIN — HYDROMORPHONE HYDROCHLORIDE 1 MG: 1 INJECTION, SOLUTION INTRAMUSCULAR; INTRAVENOUS; SUBCUTANEOUS at 08:09

## 2023-04-12 RX ADMIN — ROSUVASTATIN CALCIUM 40 MG: 10 TABLET, FILM COATED ORAL at 20:42

## 2023-04-12 RX ADMIN — SODIUM CHLORIDE: 9 INJECTION, SOLUTION INTRAVENOUS at 15:46

## 2023-04-12 RX ADMIN — DORZOLAMIDE HYDROCHLORIDE AND TIMOLOL MALEATE 1 DROP: 20; 5 SOLUTION/ DROPS OPHTHALMIC at 21:06

## 2023-04-12 RX ADMIN — ONDANSETRON HYDROCHLORIDE 4 MG: 2 INJECTION INTRAMUSCULAR; INTRAVENOUS at 15:46

## 2023-04-12 RX ADMIN — SODIUM CHLORIDE 3000 MG: 900 INJECTION INTRAVENOUS at 20:42

## 2023-04-12 RX ADMIN — DORZOLAMIDE HYDROCHLORIDE AND TIMOLOL MALEATE 1 DROP: 20; 5 SOLUTION/ DROPS OPHTHALMIC at 08:08

## 2023-04-12 RX ADMIN — ONDANSETRON HYDROCHLORIDE 4 MG: 2 INJECTION INTRAMUSCULAR; INTRAVENOUS at 04:09

## 2023-04-12 RX ADMIN — SODIUM CHLORIDE, PRESERVATIVE FREE 10 ML: 5 INJECTION INTRAVENOUS at 20:52

## 2023-04-12 RX ADMIN — SODIUM CHLORIDE 3000 MG: 900 INJECTION INTRAVENOUS at 13:23

## 2023-04-12 RX ADMIN — SODIUM CHLORIDE 3000 MG: 900 INJECTION INTRAVENOUS at 06:16

## 2023-04-12 RX ADMIN — VANCOMYCIN HYDROCHLORIDE 1250 MG: 10 INJECTION, POWDER, LYOPHILIZED, FOR SOLUTION INTRAVENOUS at 11:16

## 2023-04-12 RX ADMIN — ONDANSETRON HYDROCHLORIDE 4 MG: 2 INJECTION INTRAMUSCULAR; INTRAVENOUS at 09:34

## 2023-04-12 RX ADMIN — EZETIMIBE 10 MG: 10 TABLET ORAL at 20:42

## 2023-04-12 RX ADMIN — OXYCODONE 10 MG: 5 TABLET ORAL at 02:13

## 2023-04-12 RX ADMIN — BUPROPION HYDROCHLORIDE 300 MG: 150 TABLET, EXTENDED RELEASE ORAL at 08:08

## 2023-04-12 RX ADMIN — POLYETHYLENE GLYCOL 3350 17 G: 17 POWDER, FOR SOLUTION ORAL at 15:46

## 2023-04-12 RX ADMIN — HYDROMORPHONE HYDROCHLORIDE 1 MG: 1 INJECTION, SOLUTION INTRAMUSCULAR; INTRAVENOUS; SUBCUTANEOUS at 04:05

## 2023-04-12 RX ADMIN — OXYCODONE 10 MG: 5 TABLET ORAL at 13:44

## 2023-04-12 RX ADMIN — OXYCODONE 10 MG: 5 TABLET ORAL at 06:14

## 2023-04-12 RX ADMIN — HYDROMORPHONE HYDROCHLORIDE 1 MG: 1 INJECTION, SOLUTION INTRAMUSCULAR; INTRAVENOUS; SUBCUTANEOUS at 20:35

## 2023-04-12 RX ADMIN — SODIUM CHLORIDE 3000 MG: 900 INJECTION INTRAVENOUS at 02:14

## 2023-04-12 ASSESSMENT — PAIN DESCRIPTION - LOCATION
LOCATION: BACK
LOCATION: RECTUM
LOCATION: LEG;BACK
LOCATION: RECTUM
LOCATION: BACK
LOCATION: LEG;BACK
LOCATION: PERINEUM

## 2023-04-12 ASSESSMENT — PAIN DESCRIPTION - DESCRIPTORS
DESCRIPTORS: ACHING
DESCRIPTORS: ACHING;BURNING
DESCRIPTORS: ACHING
DESCRIPTORS: ACHING;BURNING
DESCRIPTORS: ACHING
DESCRIPTORS: ACHING;BURNING
DESCRIPTORS: ACHING;SHARP

## 2023-04-12 ASSESSMENT — PAIN SCALES - GENERAL
PAINLEVEL_OUTOF10: 8
PAINLEVEL_OUTOF10: 3
PAINLEVEL_OUTOF10: 6
PAINLEVEL_OUTOF10: 3
PAINLEVEL_OUTOF10: 7
PAINLEVEL_OUTOF10: 6
PAINLEVEL_OUTOF10: 8
PAINLEVEL_OUTOF10: 8

## 2023-04-12 ASSESSMENT — PAIN DESCRIPTION - ORIENTATION
ORIENTATION: LEFT;RIGHT
ORIENTATION: POSTERIOR
ORIENTATION: POSTERIOR
ORIENTATION: LEFT;POSTERIOR;RIGHT

## 2023-04-12 ASSESSMENT — PAIN - FUNCTIONAL ASSESSMENT
PAIN_FUNCTIONAL_ASSESSMENT: ACTIVITIES ARE NOT PREVENTED
PAIN_FUNCTIONAL_ASSESSMENT: ACTIVITIES ARE NOT PREVENTED

## 2023-04-12 NOTE — CONSULTS
Surgery Consult    Subjective:      Brett Olmos is a 46 y.o. male with hypertension, GERD, sleep apnea ,Pul hypertension and Obese. He had right thigh abscess/severe sepsis and PABLO that required ICU admission in Feb 2023, underwent I and D of abscess by Dr Toyin Avery. He was treated with IV ceftriaxone. And getting weekly wound care. He started to have rectal pain for about 2-3 days, and developed chills and came in yesterday. Donny sob/vomiting. Found to have 2/6 cm rectal abscess on CT, lactic acidosis in ED. Admitted and placed on emperic antibiotics. Culture in blood/wound negative so far. Pt states pain is improved an rectum abscess is draining. Past Medical History:   Diagnosis Date    Arthritis     spine    Chronic pain     lumbar    Diabetes mellitus (HCC)     GERD (gastroesophageal reflux disease)     History of blood transfusion     Hx of blood clots     Hypertension     Sleep apnea     CPAP in the past, was 300 lbs, lost weight     Past Surgical History:   Procedure Laterality Date    ORTHOPEDIC SURGERY  1990    right ankle surgery      Family History   Problem Relation Age of Onset    Atrial Fibrillation Mother     Coronary Art Dis Mother     Elevated Lipids Mother     Hypertension Mother     Deep Vein Thrombosis Father     Diabetes Father     Hypertension Father      Social History     Socioeconomic History    Marital status:      Spouse name: None    Number of children: None    Years of education: None    Highest education level: None   Tobacco Use    Smoking status: Never    Smokeless tobacco: Never   Substance and Sexual Activity    Alcohol use:  Yes     Alcohol/week: 5.0 standard drinks    Drug use: No      Current Facility-Administered Medications   Medication Dose Route Frequency Provider Last Rate Last Admin    0.9 % sodium chloride infusion   IntraVENous Continuous Shyam Orellana MD 75 mL/hr at 04/11/23 0112 New Bag at 04/11/23 0112    Vancomycin-Pharmacy to dose   Other RX

## 2023-04-12 NOTE — H&P (VIEW-ONLY)
Perirectal abscess from likely ext thrombosis. Draining and improving. WBC 14-->12. Continue current mgmt. Will discuss with ID. May require surgery if not adequately draining but currently improving and patient is not ideal surgical candidate. If does not continue to progress then drainage may not be adequate and will require surgery. Discussed at length with pt and spouse. Plan:     Discussed the risks of surgery,  and the risks of general anesthetic. The patient understands the risks; any and all questions were answered to the patient's satisfaction.

## 2023-04-12 NOTE — CONSULTS
[facility] does not have a wound care provider. IP WOUND CONSULT    Carlee Ollie Gardner  MEDICAL RECORD NUMBER:  [id number]  AGE: 46 y.o. GENDER: male  : 1970  TODAY'S DATE:  2023    GENERAL     [x] Follow-up     [x] Present on Admission      Riley Benton is a 46 y.o. male referred by:   [x] Physician  [x] Nursing          PAST MEDICAL HISTORY    Past Medical History:   Diagnosis Date    Arthritis     spine    Chronic pain     lumbar    Diabetes mellitus (HCC)     GERD (gastroesophageal reflux disease)     History of blood transfusion     Hx of blood clots     Hypertension     Sleep apnea     CPAP in the past, was 300 lbs, lost weight        PAST SURGICAL HISTORY    Past Surgical History:   Procedure Laterality Date    ORTHOPEDIC SURGERY      right ankle surgery       FAMILY HISTORY    Family History   Problem Relation Age of Onset    Atrial Fibrillation Mother     Coronary Art Dis Mother     Elevated Lipids Mother     Hypertension Mother     Deep Vein Thrombosis Father     Diabetes Father     Hypertension Father        SOCIAL HISTORY    Social History     Tobacco Use    Smoking status: Never    Smokeless tobacco: Never   Substance Use Topics    Alcohol use: Yes     Alcohol/week: 5.0 standard drinks    Drug use: No       ALLERGIES    No Known Allergies    MEDICATIONS    No current facility-administered medications on file prior to encounter. Current Outpatient Medications on File Prior to Encounter   Medication Sig Dispense Refill    buPROPion (WELLBUTRIN XL) 300 MG extended release tablet Take 1 tablet by mouth daily      sildenafil (VIAGRA) 100 MG tablet Take 1 tablet by mouth in the morning and 1 tablet in the evening. metFORMIN (GLUCOPHAGE) 500 MG tablet Take 1 tablet by mouth daily      verapamil (VERELAN) 120 MG extended release capsule Take 1 capsule by mouth 3 times daily Patient only taking BID.       rosuvastatin (CRESTOR) 40 MG tablet Take 1 tablet by mouth every evening      ezetimibe

## 2023-04-13 LAB
ALBUMIN SERPL-MCNC: 3 G/DL (ref 3.4–5)
ALBUMIN/GLOB SERPL: 0.8 (ref 0.8–1.7)
ALP SERPL-CCNC: 94 U/L (ref 45–117)
ALT SERPL-CCNC: 61 U/L (ref 16–61)
ANION GAP SERPL CALC-SCNC: 3 MMOL/L (ref 3–18)
AST SERPL-CCNC: 46 U/L (ref 10–38)
BACTERIA SPEC CULT: NORMAL
BASOPHILS # BLD: 0 K/UL (ref 0–0.1)
BASOPHILS NFR BLD: 0 % (ref 0–2)
BILIRUB SERPL-MCNC: 0.4 MG/DL (ref 0.2–1)
BUN SERPL-MCNC: 11 MG/DL (ref 7–18)
BUN/CREAT SERPL: 11 (ref 12–20)
CALCIUM SERPL-MCNC: 8.3 MG/DL (ref 8.5–10.1)
CHLORIDE SERPL-SCNC: 112 MMOL/L (ref 100–111)
CO2 SERPL-SCNC: 26 MMOL/L (ref 21–32)
CREAT SERPL-MCNC: 1.03 MG/DL (ref 0.6–1.3)
DIFFERENTIAL METHOD BLD: ABNORMAL
EOSINOPHIL # BLD: 0.3 K/UL (ref 0–0.4)
EOSINOPHIL NFR BLD: 5 % (ref 0–5)
ERYTHROCYTE [DISTWIDTH] IN BLOOD BY AUTOMATED COUNT: 14.2 % (ref 11.6–14.5)
EST. AVERAGE GLUCOSE BLD GHB EST-MCNC: 134 MG/DL
GLOBULIN SER CALC-MCNC: 3.8 G/DL (ref 2–4)
GLUCOSE BLD STRIP.AUTO-MCNC: 124 MG/DL (ref 70–110)
GLUCOSE BLD STRIP.AUTO-MCNC: 129 MG/DL (ref 70–110)
GLUCOSE BLD STRIP.AUTO-MCNC: 132 MG/DL (ref 70–110)
GLUCOSE BLD STRIP.AUTO-MCNC: 134 MG/DL (ref 70–110)
GLUCOSE BLD STRIP.AUTO-MCNC: 180 MG/DL (ref 70–110)
GLUCOSE BLD STRIP.AUTO-MCNC: 217 MG/DL (ref 70–110)
GLUCOSE SERPL-MCNC: 110 MG/DL (ref 74–99)
GRAM STN SPEC: NORMAL
GRAM STN SPEC: NORMAL
HBA1C MFR BLD: 6.3 % (ref 4.2–5.6)
HCT VFR BLD AUTO: 39.6 % (ref 36–48)
HGB BLD-MCNC: 12.8 G/DL (ref 13–16)
IMM GRANULOCYTES # BLD AUTO: 0 K/UL (ref 0–0.04)
IMM GRANULOCYTES NFR BLD AUTO: 1 % (ref 0–0.5)
LYMPHOCYTES # BLD: 1.5 K/UL (ref 0.9–3.6)
LYMPHOCYTES NFR BLD: 27 % (ref 21–52)
MCH RBC QN AUTO: 28.2 PG (ref 24–34)
MCHC RBC AUTO-ENTMCNC: 32.3 G/DL (ref 31–37)
MCV RBC AUTO: 87.2 FL (ref 78–100)
MONOCYTES # BLD: 0.5 K/UL (ref 0.05–1.2)
MONOCYTES NFR BLD: 9 % (ref 3–10)
NEUTS SEG # BLD: 3.2 K/UL (ref 1.8–8)
NEUTS SEG NFR BLD: 58 % (ref 40–73)
NRBC # BLD: 0 K/UL (ref 0–0.01)
NRBC BLD-RTO: 0 PER 100 WBC
PLATELET # BLD AUTO: 257 K/UL (ref 135–420)
PMV BLD AUTO: 10.1 FL (ref 9.2–11.8)
POTASSIUM SERPL-SCNC: 4.3 MMOL/L (ref 3.5–5.5)
PROT SERPL-MCNC: 6.8 G/DL (ref 6.4–8.2)
RBC # BLD AUTO: 4.54 M/UL (ref 4.35–5.65)
SERVICE CMNT-IMP: NORMAL
SODIUM SERPL-SCNC: 141 MMOL/L (ref 136–145)
WBC # BLD AUTO: 5.6 K/UL (ref 4.6–13.2)

## 2023-04-13 PROCEDURE — 3700000000 HC ANESTHESIA ATTENDED CARE: Performed by: SURGERY

## 2023-04-13 PROCEDURE — 82962 GLUCOSE BLOOD TEST: CPT

## 2023-04-13 PROCEDURE — 6360000002 HC RX W HCPCS: Performed by: INTERNAL MEDICINE

## 2023-04-13 PROCEDURE — 6370000000 HC RX 637 (ALT 250 FOR IP): Performed by: INTERNAL MEDICINE

## 2023-04-13 PROCEDURE — 87116 MYCOBACTERIA CULTURE: CPT

## 2023-04-13 PROCEDURE — 7100000001 HC PACU RECOVERY - ADDTL 15 MIN: Performed by: SURGERY

## 2023-04-13 PROCEDURE — 6370000000 HC RX 637 (ALT 250 FOR IP): Performed by: FAMILY MEDICINE

## 2023-04-13 PROCEDURE — 7100000000 HC PACU RECOVERY - FIRST 15 MIN: Performed by: SURGERY

## 2023-04-13 PROCEDURE — 2580000003 HC RX 258: Performed by: SPECIALIST

## 2023-04-13 PROCEDURE — 2580000003 HC RX 258: Performed by: FAMILY MEDICINE

## 2023-04-13 PROCEDURE — 6360000002 HC RX W HCPCS: Performed by: FAMILY MEDICINE

## 2023-04-13 PROCEDURE — 3700000001 HC ADD 15 MINUTES (ANESTHESIA): Performed by: SURGERY

## 2023-04-13 PROCEDURE — 87205 SMEAR GRAM STAIN: CPT

## 2023-04-13 PROCEDURE — 87077 CULTURE AEROBIC IDENTIFY: CPT

## 2023-04-13 PROCEDURE — 80053 COMPREHEN METABOLIC PANEL: CPT

## 2023-04-13 PROCEDURE — 83036 HEMOGLOBIN GLYCOSYLATED A1C: CPT

## 2023-04-13 PROCEDURE — 3600000002 HC SURGERY LEVEL 2 BASE: Performed by: SURGERY

## 2023-04-13 PROCEDURE — 0D9P0ZZ DRAINAGE OF RECTUM, OPEN APPROACH: ICD-10-PCS | Performed by: SURGERY

## 2023-04-13 PROCEDURE — 1100000000 HC RM PRIVATE

## 2023-04-13 PROCEDURE — 85025 COMPLETE CBC W/AUTO DIFF WBC: CPT

## 2023-04-13 PROCEDURE — 2709999900 HC NON-CHARGEABLE SUPPLY: Performed by: SURGERY

## 2023-04-13 PROCEDURE — 36415 COLL VENOUS BLD VENIPUNCTURE: CPT

## 2023-04-13 PROCEDURE — 2580000003 HC RX 258: Performed by: INTERNAL MEDICINE

## 2023-04-13 PROCEDURE — 3600000012 HC SURGERY LEVEL 2 ADDTL 15MIN: Performed by: SURGERY

## 2023-04-13 PROCEDURE — 87075 CULTR BACTERIA EXCEPT BLOOD: CPT

## 2023-04-13 PROCEDURE — 6360000002 HC RX W HCPCS: Performed by: SPECIALIST

## 2023-04-13 RX ORDER — SODIUM CHLORIDE 0.9 % (FLUSH) 0.9 %
5-40 SYRINGE (ML) INJECTION PRN
Status: DISCONTINUED | OUTPATIENT
Start: 2023-04-13 | End: 2023-04-13 | Stop reason: HOSPADM

## 2023-04-13 RX ORDER — DIPHENHYDRAMINE HYDROCHLORIDE 50 MG/ML
12.5 INJECTION INTRAMUSCULAR; INTRAVENOUS
Status: DISCONTINUED | OUTPATIENT
Start: 2023-04-13 | End: 2023-04-13 | Stop reason: HOSPADM

## 2023-04-13 RX ORDER — LABETALOL HYDROCHLORIDE 5 MG/ML
10 INJECTION, SOLUTION INTRAVENOUS
Status: DISCONTINUED | OUTPATIENT
Start: 2023-04-13 | End: 2023-04-13 | Stop reason: HOSPADM

## 2023-04-13 RX ORDER — HYDROMORPHONE HYDROCHLORIDE 1 MG/ML
0.25 INJECTION, SOLUTION INTRAMUSCULAR; INTRAVENOUS; SUBCUTANEOUS EVERY 5 MIN PRN
Status: DISCONTINUED | OUTPATIENT
Start: 2023-04-13 | End: 2023-04-13 | Stop reason: HOSPADM

## 2023-04-13 RX ORDER — METOPROLOL TARTRATE 50 MG/1
100 TABLET, FILM COATED ORAL 2 TIMES DAILY
Status: DISCONTINUED | OUTPATIENT
Start: 2023-04-13 | End: 2023-04-14 | Stop reason: HOSPADM

## 2023-04-13 RX ORDER — IPRATROPIUM BROMIDE AND ALBUTEROL SULFATE 2.5; .5 MG/3ML; MG/3ML
1 SOLUTION RESPIRATORY (INHALATION)
Status: DISCONTINUED | OUTPATIENT
Start: 2023-04-13 | End: 2023-04-13 | Stop reason: HOSPADM

## 2023-04-13 RX ORDER — INSULIN LISPRO 100 [IU]/ML
0-8 INJECTION, SOLUTION INTRAVENOUS; SUBCUTANEOUS ONCE
Status: DISCONTINUED | OUTPATIENT
Start: 2023-04-13 | End: 2023-04-14

## 2023-04-13 RX ORDER — DROPERIDOL 2.5 MG/ML
0.62 INJECTION, SOLUTION INTRAMUSCULAR; INTRAVENOUS
Status: DISCONTINUED | OUTPATIENT
Start: 2023-04-13 | End: 2023-04-13 | Stop reason: HOSPADM

## 2023-04-13 RX ORDER — FENTANYL CITRATE 50 UG/ML
25 INJECTION, SOLUTION INTRAMUSCULAR; INTRAVENOUS EVERY 5 MIN PRN
Status: DISCONTINUED | OUTPATIENT
Start: 2023-04-13 | End: 2023-04-13 | Stop reason: HOSPADM

## 2023-04-13 RX ORDER — ONDANSETRON 2 MG/ML
4 INJECTION INTRAMUSCULAR; INTRAVENOUS
Status: DISCONTINUED | OUTPATIENT
Start: 2023-04-13 | End: 2023-04-13 | Stop reason: HOSPADM

## 2023-04-13 RX ORDER — SODIUM CHLORIDE, SODIUM LACTATE, POTASSIUM CHLORIDE, CALCIUM CHLORIDE 600; 310; 30; 20 MG/100ML; MG/100ML; MG/100ML; MG/100ML
INJECTION, SOLUTION INTRAVENOUS CONTINUOUS
Status: DISCONTINUED | OUTPATIENT
Start: 2023-04-13 | End: 2023-04-14

## 2023-04-13 RX ORDER — SODIUM CHLORIDE 0.9 % (FLUSH) 0.9 %
5-40 SYRINGE (ML) INJECTION EVERY 12 HOURS SCHEDULED
Status: DISCONTINUED | OUTPATIENT
Start: 2023-04-13 | End: 2023-04-13 | Stop reason: HOSPADM

## 2023-04-13 RX ORDER — SODIUM CHLORIDE 9 MG/ML
INJECTION, SOLUTION INTRAVENOUS PRN
Status: DISCONTINUED | OUTPATIENT
Start: 2023-04-13 | End: 2023-04-13 | Stop reason: HOSPADM

## 2023-04-13 RX ORDER — OXYCODONE HYDROCHLORIDE 5 MG/1
5 TABLET ORAL
Status: DISCONTINUED | OUTPATIENT
Start: 2023-04-13 | End: 2023-04-13 | Stop reason: HOSPADM

## 2023-04-13 RX ORDER — ROSUVASTATIN CALCIUM 10 MG/1
40 TABLET, COATED ORAL EVERY EVENING
Status: DISCONTINUED | OUTPATIENT
Start: 2023-04-13 | End: 2023-04-14 | Stop reason: HOSPADM

## 2023-04-13 RX ORDER — DEXTROSE MONOHYDRATE 100 MG/ML
INJECTION, SOLUTION INTRAVENOUS CONTINUOUS PRN
Status: DISCONTINUED | OUTPATIENT
Start: 2023-04-13 | End: 2023-04-14 | Stop reason: HOSPADM

## 2023-04-13 RX ADMIN — SODIUM CHLORIDE 3000 MG: 900 INJECTION INTRAVENOUS at 21:09

## 2023-04-13 RX ADMIN — ONDANSETRON HYDROCHLORIDE 4 MG: 2 INJECTION INTRAMUSCULAR; INTRAVENOUS at 15:01

## 2023-04-13 RX ADMIN — VERAPAMIL HYDROCHLORIDE 120 MG: 120 TABLET, FILM COATED, EXTENDED RELEASE ORAL at 00:28

## 2023-04-13 RX ADMIN — SODIUM CHLORIDE 3000 MG: 900 INJECTION INTRAVENOUS at 01:48

## 2023-04-13 RX ADMIN — ENOXAPARIN SODIUM 40 MG: 100 INJECTION SUBCUTANEOUS at 21:12

## 2023-04-13 RX ADMIN — VERAPAMIL HYDROCHLORIDE 120 MG: 120 TABLET, FILM COATED, EXTENDED RELEASE ORAL at 12:20

## 2023-04-13 RX ADMIN — HYDROMORPHONE HYDROCHLORIDE 0.25 MG: 1 INJECTION, SOLUTION INTRAMUSCULAR; INTRAVENOUS; SUBCUTANEOUS at 09:44

## 2023-04-13 RX ADMIN — EZETIMIBE 10 MG: 10 TABLET ORAL at 21:04

## 2023-04-13 RX ADMIN — SODIUM CHLORIDE, PRESERVATIVE FREE 10 ML: 5 INJECTION INTRAVENOUS at 21:13

## 2023-04-13 RX ADMIN — INSULIN LISPRO 1 UNITS: 100 INJECTION, SOLUTION INTRAVENOUS; SUBCUTANEOUS at 18:11

## 2023-04-13 RX ADMIN — METOPROLOL TARTRATE 100 MG: 50 TABLET, FILM COATED ORAL at 00:28

## 2023-04-13 RX ADMIN — BUPROPION HYDROCHLORIDE 300 MG: 150 TABLET, EXTENDED RELEASE ORAL at 12:20

## 2023-04-13 RX ADMIN — SODIUM CHLORIDE, SODIUM LACTATE, POTASSIUM CHLORIDE, AND CALCIUM CHLORIDE: 600; 310; 30; 20 INJECTION, SOLUTION INTRAVENOUS at 21:03

## 2023-04-13 RX ADMIN — HYDROMORPHONE HYDROCHLORIDE 1 MG: 1 INJECTION, SOLUTION INTRAMUSCULAR; INTRAVENOUS; SUBCUTANEOUS at 14:48

## 2023-04-13 RX ADMIN — SODIUM CHLORIDE 3000 MG: 900 INJECTION INTRAVENOUS at 14:48

## 2023-04-13 RX ADMIN — DORZOLAMIDE HYDROCHLORIDE AND TIMOLOL MALEATE 1 DROP: 20; 5 SOLUTION/ DROPS OPHTHALMIC at 10:25

## 2023-04-13 RX ADMIN — METOPROLOL TARTRATE 100 MG: 50 TABLET, FILM COATED ORAL at 12:20

## 2023-04-13 RX ADMIN — OXYCODONE 10 MG: 5 TABLET ORAL at 12:20

## 2023-04-13 RX ADMIN — HYDROMORPHONE HYDROCHLORIDE 1 MG: 1 INJECTION, SOLUTION INTRAMUSCULAR; INTRAVENOUS; SUBCUTANEOUS at 21:07

## 2023-04-13 RX ADMIN — ONDANSETRON HYDROCHLORIDE 4 MG: 2 INJECTION INTRAMUSCULAR; INTRAVENOUS at 00:34

## 2023-04-13 RX ADMIN — ROSUVASTATIN CALCIUM 40 MG: 10 TABLET, COATED ORAL at 18:10

## 2023-04-13 RX ADMIN — DORZOLAMIDE HYDROCHLORIDE AND TIMOLOL MALEATE 1 DROP: 20; 5 SOLUTION/ DROPS OPHTHALMIC at 21:05

## 2023-04-13 RX ADMIN — VANCOMYCIN HYDROCHLORIDE 1250 MG: 10 INJECTION, POWDER, LYOPHILIZED, FOR SOLUTION INTRAVENOUS at 00:04

## 2023-04-13 RX ADMIN — SODIUM CHLORIDE, SODIUM LACTATE, POTASSIUM CHLORIDE, AND CALCIUM CHLORIDE: 600; 310; 30; 20 INJECTION, SOLUTION INTRAVENOUS at 08:45

## 2023-04-13 RX ADMIN — ONDANSETRON HYDROCHLORIDE 4 MG: 2 INJECTION INTRAMUSCULAR; INTRAVENOUS at 06:27

## 2023-04-13 RX ADMIN — HYDROMORPHONE HYDROCHLORIDE 1 MG: 1 INJECTION, SOLUTION INTRAMUSCULAR; INTRAVENOUS; SUBCUTANEOUS at 00:29

## 2023-04-13 RX ADMIN — LATANOPROST 1 DROP: 50 SOLUTION OPHTHALMIC at 21:03

## 2023-04-13 RX ADMIN — VANCOMYCIN HYDROCHLORIDE 1250 MG: 10 INJECTION, POWDER, LYOPHILIZED, FOR SOLUTION INTRAVENOUS at 15:37

## 2023-04-13 RX ADMIN — ONDANSETRON HYDROCHLORIDE 4 MG: 2 INJECTION INTRAMUSCULAR; INTRAVENOUS at 21:04

## 2023-04-13 RX ADMIN — OXYCODONE 10 MG: 5 TABLET ORAL at 06:28

## 2023-04-13 RX ADMIN — SODIUM CHLORIDE 3000 MG: 900 INJECTION INTRAVENOUS at 06:30

## 2023-04-13 ASSESSMENT — PAIN DESCRIPTION - ORIENTATION
ORIENTATION: POSTERIOR
ORIENTATION: POSTERIOR;RIGHT
ORIENTATION: POSTERIOR

## 2023-04-13 ASSESSMENT — PAIN DESCRIPTION - DESCRIPTORS
DESCRIPTORS: ACHING
DESCRIPTORS: ACHING;SORE;THROBBING
DESCRIPTORS: ACHING
DESCRIPTORS: ACHING
DESCRIPTORS: ACHING;STABBING
DESCRIPTORS: ACHING;SHARP

## 2023-04-13 ASSESSMENT — PAIN DESCRIPTION - LOCATION
LOCATION: RECTUM
LOCATION: BACK;RECTUM
LOCATION: RECTUM;BACK
LOCATION: BUTTOCKS
LOCATION: BACK;RECTUM
LOCATION: RECTUM
LOCATION: HIP;BACK

## 2023-04-13 ASSESSMENT — PAIN SCALES - GENERAL
PAINLEVEL_OUTOF10: 8
PAINLEVEL_OUTOF10: 7
PAINLEVEL_OUTOF10: 7
PAINLEVEL_OUTOF10: 9
PAINLEVEL_OUTOF10: 8
PAINLEVEL_OUTOF10: 10
PAINLEVEL_OUTOF10: 9
PAINLEVEL_OUTOF10: 7
PAINLEVEL_OUTOF10: 4
PAINLEVEL_OUTOF10: 7

## 2023-04-13 ASSESSMENT — PAIN - FUNCTIONAL ASSESSMENT: PAIN_FUNCTIONAL_ASSESSMENT: 0-10

## 2023-04-13 NOTE — BRIEF OP NOTE
Brief Postoperative Note      Patient: Maegan Gan  YOB: 1970  MRN: [id number]    Date of Procedure: 4/13/2023    Pre-Op Diagnosis Codes:     * Perianal abscess [K61.0]    Post-Op Diagnosis: Same       Procedure(s):  PERIRECTAL ABCESS INCISION AND DRAINAGE    Surgeon(s):  Carissa Lovelace DO    Assistant:  Surgical Assistant: Nancy Smith    Anesthesia: General    Estimated Blood Loss (mL): Minimal    Complications: None    Specimens:   ID Type Source Tests Collected by Time Destination   1 : felicita-rectal abcess Swab Rectum CULTURE, ANAEROBIC, CULTURE, WOUND, CULTURE WITH SMEAR, ACID FAST BACILLIUS Carissa Lovelace DO 4/13/2023 3951        Implants:  * No implants in log *      Drains: * No LDAs found *    Findings: left thrombosed necrotic external hemorrhoid with perirectal abscess       Electronically signed by Carissa Lovelace DO on 4/13/2023 at 5:27 PM

## 2023-04-13 NOTE — PERIOP NOTE
TRANSFER - IN REPORT:    Verbal report received from 3S on Bud Gardner  being received from RN for ordered procedure      Report consisted of patient's Situation, Background, Assessment and   Recommendations(SBAR). Information from the following report(s) Nurse Handoff Report was reviewed with the receiving nurse. Opportunity for questions and clarification was provided. Assessment completed upon patient's arrival to unit and care assumed.

## 2023-04-13 NOTE — INTERVAL H&P NOTE
Update History & Physical    The patient's History and Physical of April 11, 2023 was reviewed with the patient and I examined the patient. There was no change. The surgical site was confirmed by the patient and me. Pt has drained and WBC normalized however wound needs some washout and debridement for healing. Plan for OR    Plan: The risks, benefits, expected outcome, and alternative to the recommended procedure have been discussed with the patient. Patient understands and wants to proceed with the procedure.      Electronically signed by Governor DO Jaime on 4/13/2023 at 8:19 AM

## 2023-04-13 NOTE — PERIOP NOTE
TRANSFER - OUT REPORT:    Verbal report given to JACLYN Adam on Jeaneth Arreaga  being transferred to [facility]  for routine post-op       Report consisted of patient's Situation, Background, Assessment and   Recommendations(SBAR). Information from the following report(s) Nurse Handoff Report, Adult Overview, Surgery Report, Intake/Output, and MAR was reviewed with the receiving nurse. [place] Assessment: No data recorded  Lines:   Peripheral IV 04/10/23 Left Antecubital (Active)   Site Assessment Clean, dry & intact 04/13/23 0948   Line Status Infusing 04/13/23 0948   Line Care Connections checked and tightened 04/13/23 0838   Phlebitis Assessment No symptoms 04/13/23 0948   Infiltration Assessment 0 04/13/23 0948   Alcohol Cap Used Yes 04/13/23 0300   Dressing Status Clean, dry & intact 04/13/23 0948   Dressing Type Transparent 04/13/23 0948        Opportunity for questions and clarification was provided.       Patient transported with:  Registered Nurse and [employer]

## 2023-04-13 NOTE — OP NOTE
electrocautery was used to achieve hemostasis. The cavity of the wound was packed with gauze and was covered with a dry dressing. The patient was then awakened and transported to the PACU in stable condition having tolerated the procedure well. Post procedure instructions and meds were given.     Electronically signed by America Cabezas DO on 4/13/2023 at 5:28 PM

## 2023-04-13 NOTE — PLAN OF CARE
Problem: Pain  Goal: Verbalizes/displays adequate comfort level or baseline comfort level  Outcome: Progressing     Problem: Risk for Elopement  Goal: Patient will not exit the unit/facility without proper excort  Outcome: Progressing     Problem: Safety - Adult  Goal: Free from fall injury  Outcome: Progressing

## 2023-04-14 VITALS
WEIGHT: 315 LBS | TEMPERATURE: 98.8 F | DIASTOLIC BLOOD PRESSURE: 82 MMHG | RESPIRATION RATE: 20 BRPM | SYSTOLIC BLOOD PRESSURE: 150 MMHG | OXYGEN SATURATION: 97 % | HEART RATE: 57 BPM | HEIGHT: 69 IN | BODY MASS INDEX: 46.65 KG/M2

## 2023-04-14 LAB
ACID FAST STN SPEC: NEGATIVE
GLUCOSE BLD STRIP.AUTO-MCNC: 149 MG/DL (ref 70–110)
GLUCOSE BLD STRIP.AUTO-MCNC: 166 MG/DL (ref 70–110)
MYCOBACTERIUM SPEC QL CULT: NORMAL
SPECIMEN PREPARATION: NORMAL
SPECIMEN SOURCE: NORMAL

## 2023-04-14 PROCEDURE — 2580000003 HC RX 258: Performed by: INTERNAL MEDICINE

## 2023-04-14 PROCEDURE — 6370000000 HC RX 637 (ALT 250 FOR IP): Performed by: SURGERY

## 2023-04-14 PROCEDURE — 6360000002 HC RX W HCPCS: Performed by: INTERNAL MEDICINE

## 2023-04-14 PROCEDURE — 6370000000 HC RX 637 (ALT 250 FOR IP): Performed by: FAMILY MEDICINE

## 2023-04-14 PROCEDURE — 97602 WOUND(S) CARE NON-SELECTIVE: CPT

## 2023-04-14 PROCEDURE — 82962 GLUCOSE BLOOD TEST: CPT

## 2023-04-14 PROCEDURE — 6370000000 HC RX 637 (ALT 250 FOR IP): Performed by: INTERNAL MEDICINE

## 2023-04-14 RX ORDER — SACCHAROMYCES BOULARDII 250 MG
500 CAPSULE ORAL 2 TIMES DAILY
Qty: 28 CAPSULE | Refills: 0 | Status: SHIPPED | OUTPATIENT
Start: 2023-04-14 | End: 2023-04-21

## 2023-04-14 RX ORDER — OXYCODONE HYDROCHLORIDE 5 MG/1
10 TABLET ORAL EVERY 4 HOURS PRN
Status: DISCONTINUED | OUTPATIENT
Start: 2023-04-14 | End: 2023-04-14 | Stop reason: HOSPADM

## 2023-04-14 RX ORDER — ENOXAPARIN SODIUM 100 MG/ML
30 INJECTION SUBCUTANEOUS 2 TIMES DAILY
Status: DISCONTINUED | OUTPATIENT
Start: 2023-04-14 | End: 2023-04-14 | Stop reason: HOSPADM

## 2023-04-14 RX ORDER — AMOXICILLIN AND CLAVULANATE POTASSIUM 875; 125 MG/1; MG/1
1 TABLET, FILM COATED ORAL 2 TIMES DAILY
Qty: 14 TABLET | Refills: 0 | Status: SHIPPED | OUTPATIENT
Start: 2023-04-14 | End: 2023-04-21

## 2023-04-14 RX ORDER — OXYCODONE AND ACETAMINOPHEN 10; 325 MG/1; MG/1
1 TABLET ORAL EVERY 6 HOURS PRN
Qty: 12 TABLET | Refills: 0 | Status: SHIPPED | OUTPATIENT
Start: 2023-04-14 | End: 2023-04-17

## 2023-04-14 RX ORDER — HYDRALAZINE HYDROCHLORIDE 20 MG/ML
10 INJECTION INTRAMUSCULAR; INTRAVENOUS EVERY 6 HOURS PRN
Status: DISCONTINUED | OUTPATIENT
Start: 2023-04-14 | End: 2023-04-14 | Stop reason: HOSPADM

## 2023-04-14 RX ORDER — SILDENAFIL 100 MG/1
100 TABLET, FILM COATED ORAL 2 TIMES DAILY
Status: DISCONTINUED | OUTPATIENT
Start: 2023-04-14 | End: 2023-04-14 | Stop reason: HOSPADM

## 2023-04-14 RX ADMIN — METOPROLOL TARTRATE 100 MG: 50 TABLET, FILM COATED ORAL at 00:10

## 2023-04-14 RX ADMIN — VERAPAMIL HYDROCHLORIDE 120 MG: 120 TABLET, FILM COATED, EXTENDED RELEASE ORAL at 00:10

## 2023-04-14 RX ADMIN — SODIUM CHLORIDE 3000 MG: 900 INJECTION INTRAVENOUS at 01:00

## 2023-04-14 RX ADMIN — OXYCODONE 10 MG: 5 TABLET ORAL at 00:58

## 2023-04-14 RX ADMIN — OXYCODONE 10 MG: 5 TABLET ORAL at 13:47

## 2023-04-14 RX ADMIN — SODIUM CHLORIDE 3000 MG: 900 INJECTION INTRAVENOUS at 06:48

## 2023-04-14 RX ADMIN — BUPROPION HYDROCHLORIDE 300 MG: 150 TABLET, EXTENDED RELEASE ORAL at 08:03

## 2023-04-14 RX ADMIN — METOPROLOL TARTRATE 100 MG: 50 TABLET, FILM COATED ORAL at 09:56

## 2023-04-14 RX ADMIN — DORZOLAMIDE HYDROCHLORIDE AND TIMOLOL MALEATE 1 DROP: 20; 5 SOLUTION/ DROPS OPHTHALMIC at 08:02

## 2023-04-14 RX ADMIN — OXYCODONE 10 MG: 5 TABLET ORAL at 06:46

## 2023-04-14 RX ADMIN — VERAPAMIL HYDROCHLORIDE 120 MG: 120 TABLET, FILM COATED, EXTENDED RELEASE ORAL at 09:56

## 2023-04-14 RX ADMIN — ONDANSETRON 4 MG: 4 TABLET, ORALLY DISINTEGRATING ORAL at 06:46

## 2023-04-14 ASSESSMENT — PAIN DESCRIPTION - LOCATION
LOCATION: RECTUM
LOCATION: BACK;RECTUM
LOCATION: RECTUM;BACK

## 2023-04-14 ASSESSMENT — PAIN SCALES - GENERAL
PAINLEVEL_OUTOF10: 8
PAINLEVEL_OUTOF10: 9
PAINLEVEL_OUTOF10: 9
PAINLEVEL_OUTOF10: 8

## 2023-04-14 ASSESSMENT — PAIN DESCRIPTION - ORIENTATION
ORIENTATION: POSTERIOR

## 2023-04-14 ASSESSMENT — PAIN DESCRIPTION - DESCRIPTORS
DESCRIPTORS: BURNING
DESCRIPTORS: SHARP;DULL

## 2023-04-14 ASSESSMENT — PAIN DESCRIPTION - PAIN TYPE: TYPE: SURGICAL PAIN;CHRONIC PAIN

## 2023-04-14 ASSESSMENT — PAIN - FUNCTIONAL ASSESSMENT: PAIN_FUNCTIONAL_ASSESSMENT: PREVENTS OR INTERFERES SOME ACTIVE ACTIVITIES AND ADLS

## 2023-04-14 NOTE — DISCHARGE SUMMARY
right thigh infection is admitted for sepsis due to perirectal abscess and cellulitis. ID and surgeon are consulted. IV abx started. perirectal abscess I&D per dr. Maryse Mcdonald, he tolerated well. He is cleared to be dc per surgeon and ID. He remained afebrile and pain controlled per po medication continue local wound care and po abx and sitz bath at home. Discharge planning discussed with patient, pt agrees  with the plan and no questions and concerns at this point. Activity: activity as tolerated    Diet: cardiac diet diabetic diet     Follow-up: PCP ID -dr Latonya Moore and Dr. Maryse Mcdonald     Disposition: home with home health     Minutes spent on discharge: 45 min       Labs: Results:       Chemistry Recent Labs     04/12/23 0220 04/13/23  0102    141   K 4.0 4.3    112*   CO2 26 26   BUN 14 11   GLOB 4.6* 3.8      CBC w/Diff Recent Labs     04/12/23 0220 04/13/23  0102   WBC 6.1 5.6   RBC 4.49 4.54   HGB 12.6* 12.8*   HCT 40.4 39.6    257      Cardiac Enzymes No results for input(s): CPK, OLIVIA in the last 72 hours. Invalid input(s): CKRMB, CKND1, TROIP   Coagulation No results for input(s): INR, APTT in the last 72 hours. Invalid input(s): PTP    Lipid Panel No results found for: CHOL, CHOLPOCT, CHOLX, CHLST, CHOLV, 085042, HDL, HDLC, LDL, LDLC, 602301, VLDLC, VLDL, TGLX, TRIGL   BNP Invalid input(s): BNPP   Liver Enzymes No results for input(s): TP, ALB in the last 72 hours. Invalid input(s): TBIL, AP, SGOT, GPT, DBIL   Thyroid Studies No results found for: T4, TSH       @micro    Significant Diagnostic Studies: CT ABDOMEN PELVIS W IV CONTRAST Additional Contrast? None    Result Date: 4/10/2023  INDICATION: Recal pain, concern for perirectal abscess COMPARISON: None TECHNIQUE: Thin axial images were obtained through the abdomen and pelvis following intravenous iodinated contrast administration. Coronal and sagittal reconstructions were generated. Oral contrast was not administered.  CT dose

## 2023-04-14 NOTE — WOUND CARE
Discharge Instructions from  [facility]  [address] [place], [address]  [phone] Fax [phone]    NAME:  Ted Howard  YOB: 1970  MEDICAL RECORD NUMBER:  [id number]  DATE:  4/10/2023    Wound Cleansing:   Do not scrub or use excessive force. Cleanse wound prior to applying a clean dressing with:  [x] Normal Saline   [x] Wound Cleanser    [x]Cleanse wound with Mild Soap & Water       Topical Treatments:  Do not apply lotions, creams, or ointments to wound bed unless directed. Dressings:           Wound Location thigh   [x] Apply Primary Dressing:         [x] Hydrofera Blue       Or                [x] Pack wound loosely with[x] Iodoform     [x] Cover and Secure with:     [x] Gauze     [x] ABD    [x] Change dressing: [x] Three times per week      Eileen-rectal orders - follow Dr Lise Crouch Note  Abx per ID  Remove perirectal packing 2-3 days postop (no need to repack)  Sitz bath BID and after every BM  Outpatient wound care f/u    Dietary:  [x] Diet as tolerated: [] Calorie Diabetic Diet: [x] No Added Salt:  [x] Increase Protein:   Activity:  [x] Activity as tolerated:              Return Appointment:    [x] ECF or Home Healthcare: [facility]    [x] Return Appointment: With Ishmael Carrel  in  [facility])        Electronically signed Ermelinda Flowers RN on 4/14/2023 at 1:17 PM     [address] [place] [address] Information: Should you experience any significant changes in your wound(s) or have questions about your wound care, please contact the [facility] Main at [address] [place] [address] 8:00 am - 4:30. If you need help with your wound outside these hours and cannot wait until we are again available, contact your PCP or go to the hospital emergency room. PLEASE NOTE: IF YOU ARE UNABLE TO OBTAIN WOUND SUPPLIES, CONTINUE TO USE THE SUPPLIES YOU HAVE AVAILABLE UNTIL YOU ARE ABLE TO REACH US.  IT IS MOST IMPORTANT TO KEEP THE WOUND COVERED AT ALL

## 2023-04-14 NOTE — PROGRESS NOTES
[address] [facility] Pharmacokinetic Monitoring Service - Vancomycin    Consulting Provider: Dr. Khushboo Greenfield   Indication: SSTI - 5 Day Tx  Target Concentration: Goal AUC/-600 mg*hr/L  Day of Therapy: 3  Additional Antimicrobials: Unasyn    Pertinent Laboratory Values: Wt Readings from Last 1 Encounters:   04/11/23 (!) 331 lb (150.1 kg)     Temp Readings from Last 1 Encounters:   04/12/23 97.8 °F (36.6 °C) (Oral)     Estimated Creatinine Clearance: 122 mL/min (based on SCr of 1.03 mg/dL).   Recent Labs     04/11/23  0600 04/12/23  0220   CREATININE 1.13 1.03   WBC 12.1 6.1       Recent vancomycin administrations                     vancomycin (VANCOCIN) 1,000 mg in sodium chloride 0.9 % 250 mL (vial-mate) IVPB (mg) 1,000 mg New Bag 04/11/23 2251     1,000 mg New Bag  1134    vancomycin (VANCOCIN) 2000 mg in 0.9% sodium chloride 500 mL IVPB (mg) 2,000 mg New Bag 04/10/23 2342                    Assessment:  Date/Time Current Dose Concentration Timing of Concentration (h) AUC   04/12/2023 Vancomycin 1000 mg IV q12hrs 19.0 mg/l 02:20 04/12/2023 391 mg/l.hr   Note: Serum concentrations collected for AUC dosing may appear elevated if collected in close proximity to the dose administered, this is not necessarily an indication of toxicity    Plan:  Current dosing regimen is sub-therapeutic  Changed to Vancomycin 1250 mg IV q12hrs  x 7 remaining doses ( 5 day Tx )  Estimated  mg/l.hr  Trough 14.6 mg/l  Pharmacy will continue to monitor patient and adjust therapy as indicated    TOMI REID RPH, BCPS  4/12/2023 10:30 AM
[address] [facility] Pharmacokinetic Monitoring Service - Vancomycin    Consulting Provider: Dr. Veronika Narayanan   Indication: SSTI  5 day Tx  Target Concentration: Goal AUC/-600 mg*hr/L  Day of Therapy: 2      Pertinent Laboratory Values: Wt Readings from Last 1 Encounters:   04/11/23 (!) 331 lb (150.1 kg)     Temp Readings from Last 1 Encounters:   04/11/23 98 °F (36.7 °C)     Estimated Creatinine Clearance: 111 mL/min (based on SCr of 1.13 mg/dL).   Recent Labs     04/10/23  1733 04/11/23  0600   CREATININE 1.30 1.13   WBC 14.9* 12.1     Recent vancomycin administrations                     vancomycin (VANCOCIN) 1,000 mg in sodium chloride 0.9 % 250 mL (vial-mate) IVPB (mg) 1,000 mg New Bag 04/11/23 1134    vancomycin (VANCOCIN) 2000 mg in 0.9% sodium chloride 500 mL IVPB (mg) 2,000 mg New Bag 04/10/23 2342               Plan:  Updated  mg/l.hr  Trough 13.8 mg/l  Vancomycin Random Level ordered with am labs 4/12/2023  Pharmacy will continue to monitor patient and adjust therapy as indicated    TOMI REID RPH, BCPS  4/11/2023 11:48 AM
[address] [facility] Pharmacokinetic Monitoring Service - Vancomycin    Consulting Provider: Mary Linda   Indication: SSTI x 5 days  Target Concentration: Goal AUC/-600 mg*hr/L  Day of Therapy: 1  Additional Antimicrobials: None    Pertinent Laboratory Values: Wt Readings from Last 1 Encounters:   04/10/23 (!) 330 lb (149.7 kg)     Temp Readings from Last 1 Encounters:   04/10/23 100.1 °F (37.8 °C) (Oral)     Estimated Creatinine Clearance: 96 mL/min (based on SCr of 1.3 mg/dL). Recent Labs     04/10/23  1733   CREATININE 1.30   WBC 14.9*       Recent vancomycin administrations                     vancomycin (VANCOCIN) 2000 mg in 0.9% sodium chloride 500 mL IVPB (mg) 2,000 mg New Bag 04/10/23 2342                      Plan:  Vancomycin 2g IV X1 administered 04/10/23 2342.   Vancomycin 1g IV Q12H maintenance doses to begin as scheduled for anticipated  mg/L.hr.  Pharmacy will continue to monitor patient and adjust therapy as indicated    Celio Hopkins PharmD  [phone]
AFVSS  Feels better  Still has some drainage. Continue IV abx for now.
Case Management Assessment  Initial Evaluation    Date/Time of Evaluation: 4/12/2023  11:40 AM  Assessment Completed by: Donnie Santana    If patient is discharged prior to next notation, then this note serves as note for discharge by case management. Patient Name: Jody Bonilla                   YOB: 1970  Diagnosis: Perirectal abscess [K61.1]  Eileen-rectal abscess [K61.1]                   Date / Time: 4/10/2023  5:15 PM    Patient Admission Status: Inpatient   Readmission Risk (Low < 19, Mod (19-27), High > 27): Readmission Risk Score: 11.5    Current PCP: Reyna Eubanks MD  PCP verified by CM? Yes    Chart Reviewed: Yes      History Provided by: Patient  Patient Orientation: Alert and Oriented    Patient Cognition: Alert    Hospitalization in the last 30 days (Readmission):  No    If yes, Readmission Assessment in CM Navigator will be completed. Advance Directives:      Code Status: Full Code   Patient's Primary Decision Maker is:      Primary Decision Maker: Fiona Maurer - [phone]    Discharge Planning:    Patient lives with: Spouse/Significant Other Type of Home: House  Primary Care Giver: Self  Patient Support Systems include: Spouse/Significant Other, Parent   Current Financial resources:    Current community resources:    Current services prior to admission: Wound Vac            Current DME:              Type of Home Care services:  Nursing Services, PT, OT    ADLS  Prior functional level: Independent in ADLs/IADLs  Current functional level: Independent in ADLs/IADLs    PT AM-PAC:   /24  OT AM-PAC:   /24    Family can provide assistance at DC: Yes  Would you like Case Management to discuss the discharge plan with any other family members/significant others, and if so, who? Plans to Return to Present Housing: Yes  Other Identified Issues/Barriers to RETURNING to current housing: clinical progression   Potential Assistance needed at discharge:  Other (Comment)
Chart reviewed  OR culture: NGSF    Can DC home on Oral Augmentin X1 week  FU with me in wound clinic   Wound packing on his thigh to be done prior to DC by wound care RN    Available PRN    Staci Hinojosa MD  Office #:     [phone]   Office Fax: [phone]
Consult order placed but this was in error as the patient is admitted by Dr. Gee Watts. Consult cancelled.
D/C Plan: [facility] with physician follow up     Noted plan discharge today with the above services. Pt is aware and in agreement with this plan. Anticipate pt will transition home today with [facility] and physician follow up. No other care transition needs have been identified. CM remains available to assist as needed.
Hospitalist Progress Note    Patient: Christiano Quispe MRN: [id number]  CSN: [id number]    YOB: 1970  Age: 46 y.o. Sex: male    DOA: 4/10/2023 LOS:  LOS: 2 days          Assessment/Plan     Active Hospital Problems    Diagnosis     Pulmonary hypertension ([facility] [I27.20]      Priority: Medium    Open wound of right thigh [S71.101A]      Priority: Medium    Perirectal abscess [K61.1]     Perirectal cellulitis [K61.1]     HTN (hypertension) [I10]     Controlled type 2 diabetes mellitus, without long-term current use of insulin (HCC) [E11.9]     VLADIMIR (obstructive sleep apnea) [G47.33]     Sepsis ([facility] [A41.9]     BMI 45.0-49.9, adult [facility]) [Z68.42]         Chief complaint :  Rectal pain and swelling      47 y/o male with morbid obesity, HTN, VLADIMIR, pulmonary hypertension, and recent hospital stay for right thigh infection is admitted for sepsis due to perirectal abscess and cellulitis. Sepsis due to perirectal abscess with cellulitis -  unasyn, vancomycin  Follow up blood and wound cultures    Infectious disease following    General surgery consulted >not ideal surgical candidate, is draining and improving and WBC 12--->6    Open wound of the right thigh -  Wound care consulted     VLADIMIR-  dose not have CPAP at home  RT consult to fit for CPAP       Type 2 diabetes mellitus -  Sliding scale insulin  Diabetic diet     HTN -  Hold antihypertensives for now given sepsis     Pulmonary hypertension -  Hold sildenafil for now     BMI 48.7 -  Aggressive lifestyle modification needed     Prophylaxis: lovenox SQ    Disposition : 1-2 days    S: RN concerned that pt has not had a BM  Pt states that he drank a lot of mag citrate and \"I cleaned myself out\" and when he does this, he usually does not defecate for at least 3 days afterwards    +nausea    Review of systems  General: No fevers or chills. Cardiovascular: No chest pain or pressure. No palpitations. Pulmonary: No shortness of breath.    Gastrointestinal: No
Hospitalist Progress Note    Patient: Jeaneth Arreaga MRN: [id number]  CSN: [id number]    YOB: 1970  Age: 46 y.o. Sex: male    DOA: 4/10/2023 LOS:  LOS: 1 day                Assessment/Plan     Active Hospital Problems    Diagnosis     Pulmonary hypertension ([facility] [I27.20]      Priority: Medium    Open wound of right thigh [S71.101A]      Priority: Medium    Perirectal abscess [K61.1]     Perirectal cellulitis [K61.1]     HTN (hypertension) [I10]     Controlled type 2 diabetes mellitus, without long-term current use of insulin (HCC) [E11.9]     VLADIMIR (obstructive sleep apnea) [G47.33]     Sepsis ([facility] [A41.9]     BMI 45.0-49.9, adult [facility]) [Z68.42]         Chief complaint :  Rectal pain and swelling  45 y/o male with morbid obesity, HTN, VLADIMIR, pulmonary hypertension, and recent hospital stay for right thigh infection is admitted for sepsis due to perirectal abscess and cellulitis. Sepsis due to perirectal abscess with cellulitis -  unasyn, vancomycin  Follow up blood and wound cultures  Dr. Griffin Diaz will take to OR  Infectious disease following       Open wound of the right thigh -  Wound care consulted     VLADIMIR-  dose not have CPAP at home  RT consult to fit for CPAP       Type 2 diabetes mellitus -  Sliding scale insulin  Diabetic diet     HTN -  Hold antihypertensives for now given sepsis     Pulmonary hypertension -  Hold sildenafil for now     BMI 48.7 -  Aggressive lifestyle modification needed     Prophylaxis: lovenox SQ    Disposition : 1-2 days    Review of systems  General: No fevers or chills. Cardiovascular: No chest pain or pressure. No palpitations. Pulmonary: No shortness of breath. Gastrointestinal: No nausea, vomiting. Physical Exam:  General: Awake, cooperative, no acute distress    HEENT: NC, Atraumatic. PERRLA, anicteric sclerae. Lungs: CTA Bilaterally. No Wheezing/Rhonchi/Rales. Heart:  S1 S2,  No murmur, No Rubs, No Gallops  Abdomen: Soft, Non distended, Non tender.
I spoke with Dr. Kiel Goode to clarify and I will take the patient on the hospitalist service. NPO after midnight. Dr. Kiel Goode will consult.
POD#1  Pt doing well, pain control adequate    LE wound viable  Perirectal wound clean w packing/dressing    OK to d/c when OK w IM  Abx per ID  Remove perirectal packing 2-3 days postop (no need to repack)  Sitz bath BID and after every BM  Outpatient wound care f/u
Pharmacist Review and Automatic Dose Adjustment of Prophylactic Enoxaparin    The reviewing pharmacist has made an adjustment to the ordered enoxaparin dose or converted to UFH per the approved [facility] protocol and table as identified below. [place] Estrada is a 46 y.o. male. Recent Labs     04/12/23  0220 04/13/23  0102   CREATININE 1.03 1.03       Estimated Creatinine Clearance: 122 mL/min (based on SCr of 1.03 mg/dL). Recent Labs     04/12/23 0220 04/13/23  0102   HGB 12.6* 12.8*   HCT 40.4 39.6    257     No results for input(s): INR in the last 72 hours. Height:   Ht Readings from Last 1 Encounters:   04/13/23 5' 9\" (1.753 m)     Weight:  Wt Readings from Last 1 Encounters:   04/13/23 (!) 331 lb (150.1 kg)       Plan: Based upon the patient's weight and renal function, the ordered enoxaparin dose of 40 mg daily has been changed/converted to enoxaparin 30 mg BID.       Thank you,  Dorothy Gonsales, [facility], Pharm D  4/14/2023, 11:47 AM
Progress Summary:  Patient continues to c/o pain 8-9/10. MD GARCIA aware and in to see patient. Patient NPO and planning to go to OR this morning. VSS, call bll within reach.
Progress Summary:  Patient continues to c/o rectal pain 8-9/10 with minor relief noted. Patient sits on the side of bed continuously, encouraged to elevate legs on bed. VSS, uneventful night, call bell within reach.
Pt can DC home with wound care follow up. Sitz baths 2x day.   No need to repack wound
Pt refused CPAP at this time
Pt returned to unit. Pt vital signs were taken and pt comfortable in bed.
Refused hospital CPAP
TRANSFER - IN REPORT:    Verbal report received from [facility] on Bud Gardner  being received from [facility] for routine progression of patient care      Report consisted of patient's Situation, Background, Assessment and   Recommendations(SBAR). Information from the following report(s) ED SBAR, Intake/Output, and MAR was reviewed with the receiving nurse. Opportunity for questions and clarification was provided. Assessment completed upon patient's arrival to unit and care assumed.
PERRLA, anicteric sclerae. Lungs: CTA Bilaterally. No Wheezing/Rhonchi/Rales. Heart:  S1 S2,  No murmur, No Rubs, No Gallops  Abdomen: Soft, Non distended, Non tender. +Bowel sounds,   Extremities: No c/c/e, right thigh wound. Psych:   Not anxious or agitated. Neurologic:  No acute neurological deficit. Vital signs/Intake and Output:  Visit Vitals  /85   Pulse 62   Temp 97.6 °F (36.4 °C) (Oral)   Resp 16   Ht 5' 9\" (1.753 m)   Wt (!) 331 lb (150.1 kg)   SpO2 99%   BMI 48.88 kg/m²     Current Shift:  No intake/output data recorded. Last three shifts:  04/11 1901 - 04/13 0700  In: -   Out: 1500 [Urine:1500]            Labs: Results:       Chemistry Recent Labs     04/11/23  0600 04/12/23  0220 04/13/23  0102    139 141   K 4.8 4.0 4.3    110 112*   CO2 28 26 26   BUN 15 14 11   GLOB 3.9 4.6* 3.8     ,  Lab Results   Component Value Date/Time    LACTA 1.5 02/02/2023 12:15 AM      CBC w/Diff Recent Labs     04/11/23  0600 04/12/23  0220 04/13/23  0102   WBC 12.1 6.1 5.6   RBC 4.46 4.49 4.54   HGB 12.6* 12.6* 12.8*   HCT 40.5 40.4 39.6    244 257        Cardiac Enzymes Lab Results   Component Value Date    CKTOTAL 65 02/03/2023   ,No results found for: BNP   Coagulation No results for input(s): INR, APTT in the last 72 hours. Invalid input(s): PTP    Lipid Panel No results found for: CHOL, CHOLPOCT, CHOLX, CHLST, CHOLV, 216730, HDL, HDLC, LDL, LDLC, 481372, VLDLC, VLDL, TGLX, TRIGL   Pancreas No results for input(s): LIPASE in the last 72 hours. ,No results for input(s): AMYLASE in the last 72 hours. Liver Enzymes No results for input(s): TP, ALB in the last 72 hours.     Invalid input(s): TBIL, AP, SGOT, GPT, DBIL   Thyroid Studies No results found for: T4, TSH     Procedures/imaging: see electronic medical records for all procedures/Xrays and details which were not copied into this note but were reviewed prior to creation of Plan    TIME: E/M Time spent with patient and patient
Dentition good. Lungs:   non-labored       Abdomen:  soft, non-distended   Genitourinary:  deferred   Extremities:   no clubbing, cyanosis; no joint effusions or swelling; ; muscle mass appropriate for age   Neurologic:  No gross focal sensory abnormalities;  Cranial nerves intact   Psychiatric:   appropriate and interactive. Labs: Results:   Chemistry Recent Labs     04/10/23  1733 04/11/23  0600 04/12/23  0220    138 139   K 4.5 4.8 4.0    107 110   CO2 30 28 26   BUN 19* 15 14   GLOB 4.1* 3.9 4.6*      CBC w/Diff Recent Labs     04/10/23  1733 04/11/23  0600 04/12/23  0220   WBC 14.9* 12.1 6.1   RBC 4.81 4.46 4.49   HGB 13.6 12.6* 12.6*   HCT 42.8 40.5 40.4    250 244            No results found for: SDES No components found for: CULT     Results       Procedure Component Value Units Date/Time    MRSA by PCR [[id number]] Collected: 04/11/23 1310    Order Status: Canceled Specimen: Nares     Culture, MRSA, Screening [[id number]] Collected: 04/11/23 1310    Order Status: No result Updated: 04/11/23 2313    Culture, Wound Aerobic Only [[id number]] Collected: 04/10/23 2220    Order Status: Completed Specimen: Abscess Updated: 04/12/23 0915     Special Requests NO SPECIAL REQUESTS        Gram stain RARE WBCS SEEN         NO DEFINITE ORGANISM SEEN        Culture NO GROWTH THUS FAR       Culture, Anaerobic [[id number]]     Order Status: Sent Specimen: Abscess     Blood Culture 2 [[id number]] Collected: 04/10/23 1735    Order Status: Completed Specimen: Blood Updated: 04/12/23 0752     Special Requests NO SPECIAL REQUESTS        Culture NO GROWTH 2 DAYS       Blood Culture 1 [[id number]] Collected: 04/10/23 1733    Order Status: Completed Specimen: Blood Updated: 04/12/23 0752     Special Requests NO SPECIAL REQUESTS        Culture NO GROWTH 2 DAYS                 Imaging:    All imaging reviewed from Admission to present as per radiology interpretation in [facility]    Radiology report
exposure control for dose modulation. FINDINGS: LIVER: No mass or biliary dilatation. GALLBLADDER: No calcified gallstone SPLEEN: Unremarkable PANCREAS: No mass or ductal dilatation. ADRENALS: Unremarkable. KIDNEYS/URETERS: Symmetric nephrograms with low density lesions too small to characterize. 2 mm calculus overlying the distal right ureter without evidence for any acute obstructive uropathy. No hydronephrosis or perinephric inflammation. Small bilateral nonobstructing renal calculi are seen. PERITONEUM: No abdominal lymphadenopathy or ascites. COLON: No dilatation or wall thickening. APPENDIX: Not visualized SMALL BOWEL: No dilatation or wall thickening. STOMACH: Unremarkable. PELVIS: No pelvic lymphadenopathy or free fluid. 2.6 cm left perirectal fluid collection may represent an abscess. BONES: Severe degenerative changes in the spine. Posterior fusion from L2 through L4 VISUALIZED THORAX: No significant abnormality ADDITIONAL COMMENTS: N/A     1. 2.6 cm left perirectal fluid collection which may represent a small abscess. 2. 2 mm calculus overlying the distal right ureter without evidence for acute obstructive uropathy. Several other small bilateral nonobstructing renal calculi. Clemencia Newell MD  [facility]  Office #:     [phone]  [phone]   Office Fax: [phone]

## 2023-04-14 NOTE — WOUND CARE
IP WOUND CONSULT    Clau Gardner  MEDICAL RECORD NUMBER:  [id number]  AGE: 46 y.o. GENDER: male  : 1970  TODAY'S DATE:  2023    GENERAL     [x] Follow-up     [x] Present on Admission    Abraham Liao is a 46 y.o. male referred by:     [x] Nursing          PAST MEDICAL HISTORY    Past Medical History:   Diagnosis Date    Arthritis     spine    Chronic pain     lumbar    Diabetes mellitus (HCC)     GERD (gastroesophageal reflux disease)     History of blood transfusion     Hx of blood clots     Hypertension     Sleep apnea     CPAP in the past, was 300 lbs, lost weight        PAST SURGICAL HISTORY    Past Surgical History:   Procedure Laterality Date    ORTHOPEDIC SURGERY      right ankle surgery    RECTAL SURGERY N/A 2023    PERIRECTAL ABCESS INCISION AND DRAINAGE performed by Stacey Soulier, DO at [address]    Family History   Problem Relation Age of Onset    Atrial Fibrillation Mother     Coronary Art Dis Mother     Elevated Lipids Mother     Hypertension Mother     Deep Vein Thrombosis Father     Diabetes Father     Hypertension Father        SOCIAL HISTORY    Social History     Tobacco Use    Smoking status: Never    Smokeless tobacco: Never   Substance Use Topics    Alcohol use: Yes     Alcohol/week: 5.0 standard drinks    Drug use: No       ALLERGIES    No Known Allergies    MEDICATIONS    No current facility-administered medications on file prior to encounter. Current Outpatient Medications on File Prior to Encounter   Medication Sig Dispense Refill    buPROPion (WELLBUTRIN XL) 300 MG extended release tablet Take 1 tablet by mouth daily      sildenafil (VIAGRA) 100 MG tablet Take 1 tablet by mouth in the morning and 1 tablet in the evening. metFORMIN (GLUCOPHAGE) 500 MG tablet Take 1 tablet by mouth daily      verapamil (VERELAN) 120 MG extended release capsule Take 1 capsule by mouth 3 times daily Patient only taking BID.       rosuvastatin (CRESTOR) 40 MG

## 2023-04-16 LAB
BACTERIA SPEC CULT: NORMAL
BACTERIA SPEC CULT: NORMAL
SERVICE CMNT-IMP: NORMAL
SERVICE CMNT-IMP: NORMAL

## 2023-04-17 LAB
BACTERIA SPEC CULT: ABNORMAL
BACTERIA SPEC CULT: ABNORMAL
BACTERIA SPEC CULT: NORMAL
GRAM STN SPEC: NORMAL
GRAM STN SPEC: NORMAL
SERVICE CMNT-IMP: ABNORMAL
SERVICE CMNT-IMP: NORMAL

## 2023-04-20 ENCOUNTER — HOSPITAL ENCOUNTER (OUTPATIENT)
Facility: HOSPITAL | Age: 53
Discharge: HOME | End: 2023-04-20
Payer: COMMERCIAL

## 2023-04-20 VITALS
HEART RATE: 60 BPM | RESPIRATION RATE: 20 BRPM | DIASTOLIC BLOOD PRESSURE: 85 MMHG | OXYGEN SATURATION: 98 % | TEMPERATURE: 98.6 F | SYSTOLIC BLOOD PRESSURE: 155 MMHG

## 2023-04-20 DIAGNOSIS — S71.101D OPEN WOUND OF RIGHT THIGH, SUBSEQUENT ENCOUNTER: Primary | ICD-10-CM

## 2023-04-20 PROCEDURE — 99213 OFFICE O/P EST LOW 20 MIN: CPT

## 2023-04-20 PROCEDURE — 6370000000 HC RX 637 (ALT 250 FOR IP): Performed by: INTERNAL MEDICINE

## 2023-04-20 RX ORDER — GINSENG 100 MG
CAPSULE ORAL ONCE
OUTPATIENT
Start: 2023-04-20 | End: 2023-04-20

## 2023-04-20 RX ORDER — BETAMETHASONE DIPROPIONATE 0.05 %
OINTMENT (GRAM) TOPICAL ONCE
OUTPATIENT
Start: 2023-04-20 | End: 2023-04-20

## 2023-04-20 RX ORDER — BACITRACIN, NEOMYCIN, POLYMYXIN B 400; 3.5; 5 [USP'U]/G; MG/G; [USP'U]/G
OINTMENT TOPICAL ONCE
OUTPATIENT
Start: 2023-04-20 | End: 2023-04-20

## 2023-04-20 RX ORDER — CLOBETASOL PROPIONATE 0.5 MG/G
OINTMENT TOPICAL ONCE
OUTPATIENT
Start: 2023-04-20 | End: 2023-04-20

## 2023-04-20 RX ORDER — LIDOCAINE HYDROCHLORIDE 20 MG/ML
JELLY TOPICAL PRN
Status: DISCONTINUED | OUTPATIENT
Start: 2023-04-20 | End: 2023-04-21 | Stop reason: HOSPADM

## 2023-04-20 RX ORDER — BACITRACIN ZINC AND POLYMYXIN B SULFATE 500; 1000 [USP'U]/G; [USP'U]/G
OINTMENT TOPICAL ONCE
OUTPATIENT
Start: 2023-04-20 | End: 2023-04-20

## 2023-04-20 RX ORDER — GENTAMICIN SULFATE 1 MG/G
OINTMENT TOPICAL ONCE
OUTPATIENT
Start: 2023-04-20 | End: 2023-04-20

## 2023-04-20 RX ADMIN — LIDOCAINE HYDROCHLORIDE: 20 JELLY TOPICAL at 11:05

## 2023-04-20 ASSESSMENT — PAIN SCALES - GENERAL: PAINLEVEL_OUTOF10: 2

## 2023-04-20 NOTE — FLOWSHEET NOTE
04/20/23 1102   Wound 02/03/23 Thigh Anterior;Right   Date First Assessed/Time First Assessed: 02/03/23 1130   Primary Wound Type: Surgical Type  Location: Thigh  Wound Location Orientation: Anterior;Right   Wound Etiology Non-Healing Surgical   Dressing Status Intact;Dry   Wound Cleansed Wound cleanser   Dressing/Treatment Hydrofera blue   Offloading for Diabetic Foot Ulcers Offloading not required   Wound Length (cm) 0.5 cm   Wound Width (cm) 0.5 cm   Wound Depth (cm) 1.1 cm   Wound Surface Area (cm^2) 0.25 cm^2   Change in Wound Size % (l*w) 96   Wound Volume (cm^3) 0.275 cm^3   Wound Healing % 98   Wound Assessment Granulation tissue   Drainage Amount Scant   Drainage Description Serosanguinous   Odor None   Eileen-wound Assessment Intact   Margins Defined edges   Wound Thickness Description not for Pressure Injury Full thickness   Wound 04/20/23 Perineum   Date First Assessed/Time First Assessed: 04/20/23 1102   Primary Wound Type: Other (comment)  Location: Perineum   Wound Image    Wound Etiology Other   Wound Cleansed Wound cleanser   Dressing/Treatment Hydrofera blue   Offloading for Diabetic Foot Ulcers Offloading not required   Dressing Change Due 04/22/23   Wound Length (cm) 0.4 cm   Wound Width (cm) 0.6 cm   Wound Depth (cm) 5 cm   Wound Surface Area (cm^2) 0.24 cm^2   Wound Volume (cm^3) 1.2 cm^3   Wound Assessment Granulation tissue   Drainage Amount Small   Drainage Description Serosanguinous   Odor None   Eileen-wound Assessment Intact   Margins Defined edges   Wound Thickness Description not for Pressure Injury Full thickness   Wound Follow Up   Require Follow Up Yes     Applied collagen, hydrofera blue ready, folded 4x4, drape to thigh wound.   Applied hydrofera blue ready to perirectum wound

## 2023-04-20 NOTE — WOUND CARE
THE WOUND COVERED AT ALL TIMES.
not in resp distress. HEENT: Unicteric. EOMI intact  CHEST: Non laboured breathing  LUCY: Deferred  EXT: No apparent swelling or redness on UE/LE joints. CNS: A, OX3. Moves all extremity. CN grossly ok. Skin:   --see wound chart for picture and measurements      Assessment- wound clinic related     R thigh deep wound --post surgical-- progressing smaller, but slowly  Perineum wound- post surgical--clean   Pre-DM     Other Medical issues:  Back pain from Lumbar stenosis  Morbidly Obese    Plan:     Wounds cleansed and examined. Hydrofera blue for packign used for both thigh wound and perinium wound. No I and D indicated today  FU in 1 week    Diagnosis and treatment plan reviewed with patient. Questions and concerns discussed with patient.    Discharge instructions- see RN chart    Tre Lucio MD on 4/20/2023

## 2023-04-27 ENCOUNTER — HOSPITAL ENCOUNTER (OUTPATIENT)
Facility: HOSPITAL | Age: 53
Discharge: HOME | End: 2023-04-27
Payer: COMMERCIAL

## 2023-04-27 VITALS
HEART RATE: 56 BPM | TEMPERATURE: 99.1 F | RESPIRATION RATE: 16 BRPM | OXYGEN SATURATION: 100 % | DIASTOLIC BLOOD PRESSURE: 85 MMHG | SYSTOLIC BLOOD PRESSURE: 186 MMHG

## 2023-04-27 DIAGNOSIS — S71.101D OPEN WOUND OF RIGHT THIGH, SUBSEQUENT ENCOUNTER: Primary | ICD-10-CM

## 2023-04-27 PROCEDURE — 6370000000 HC RX 637 (ALT 250 FOR IP): Performed by: HOSPITALIST

## 2023-04-27 RX ORDER — GINSENG 100 MG
CAPSULE ORAL ONCE
OUTPATIENT
Start: 2023-04-27 | End: 2023-04-27

## 2023-04-27 RX ORDER — BACITRACIN, NEOMYCIN, POLYMYXIN B 400; 3.5; 5 [USP'U]/G; MG/G; [USP'U]/G
OINTMENT TOPICAL ONCE
OUTPATIENT
Start: 2023-04-27 | End: 2023-04-27

## 2023-04-27 RX ORDER — GENTAMICIN SULFATE 1 MG/G
OINTMENT TOPICAL ONCE
OUTPATIENT
Start: 2023-04-27 | End: 2023-04-27

## 2023-04-27 RX ORDER — CLOBETASOL PROPIONATE 0.5 MG/G
OINTMENT TOPICAL ONCE
OUTPATIENT
Start: 2023-04-27 | End: 2023-04-27

## 2023-04-27 RX ORDER — BACITRACIN ZINC AND POLYMYXIN B SULFATE 500; 1000 [USP'U]/G; [USP'U]/G
OINTMENT TOPICAL ONCE
OUTPATIENT
Start: 2023-04-27 | End: 2023-04-27

## 2023-04-27 RX ORDER — BETAMETHASONE DIPROPIONATE 0.05 %
OINTMENT (GRAM) TOPICAL ONCE
OUTPATIENT
Start: 2023-04-27 | End: 2023-04-27

## 2023-04-27 RX ORDER — LIDOCAINE HYDROCHLORIDE 20 MG/ML
JELLY TOPICAL PRN
Status: DISCONTINUED | OUTPATIENT
Start: 2023-04-27 | End: 2023-04-28 | Stop reason: HOSPADM

## 2023-04-27 RX ADMIN — LIDOCAINE HYDROCHLORIDE: 20 JELLY TOPICAL at 11:10

## 2023-04-27 ASSESSMENT — PAIN SCALES - GENERAL: PAINLEVEL_OUTOF10: 0

## 2023-04-27 NOTE — WOUND CARE
Tobacco Use    Smoking status: Never    Smokeless tobacco: Never   Substance Use Topics    Alcohol use: Yes     Alcohol/week: 5.0 standard drinks    Drug use: No       ALLERGIES  No Known Allergies    MEDICATIONS  Current Outpatient Medications   Medication Sig Dispense Refill    buPROPion (WELLBUTRIN XL) 300 MG extended release tablet Take 1 tablet by mouth daily      sildenafil (VIAGRA) 100 MG tablet Take 1 tablet by mouth in the morning and 1 tablet in the evening. metFORMIN (GLUCOPHAGE) 500 MG tablet Take 1 tablet by mouth daily      verapamil (VERELAN) 120 MG extended release capsule Take 1 capsule by mouth 3 times daily Patient only taking BID. rosuvastatin (CRESTOR) 40 MG tablet Take 1 tablet by mouth every evening      ezetimibe (ZETIA) 10 MG tablet Take 1 tablet by mouth daily      metoprolol (LOPRESSOR) 100 MG tablet Take 1 tablet by mouth 2 times daily      latanoprost (XALATAN) 0.005 % ophthalmic solution Place 1 drop into both eyes nightly      dorzolamide-timolol (COSOPT) 22.3-6.8 MG/ML ophthalmic solution Place 1 drop into both eyes 2 times daily       No current facility-administered medications for this encounter. He needs to bring his medication list prior to next visit. Objective:      BP (!) 186/85   Pulse 56   Temp 99.1 °F (37.3 °C)   Resp 16   SpO2 100%        GEN: WD Morbidly obese, not in resp distress. HEENT: Unicteric. EOMI intact  CHEST: Non laboured breathing  LUCY: Deferred  EXT: No apparent swelling or redness on UE/LE joints. CNS: A, OX3. Moves all extremity. CN grossly ok. Skin:   --see wound chart for picture and measurements      Assessment- wound clinic related     R thigh deep wound --post surgical-- progressing smaller, but slowly  Perineum wound- post surgical--clean   Pre-DM     Other Medical issues:  Back pain from Lumbar stenosis  Morbidly Obese    Plan:     Wounds cleansed and examined.  Hydrofera blue for packign used for both thigh wound and

## 2023-05-04 ENCOUNTER — HOSPITAL ENCOUNTER (OUTPATIENT)
Facility: HOSPITAL | Age: 53
Discharge: HOME | End: 2023-05-04
Payer: COMMERCIAL

## 2023-05-04 VITALS
HEART RATE: 51 BPM | TEMPERATURE: 98.5 F | RESPIRATION RATE: 18 BRPM | SYSTOLIC BLOOD PRESSURE: 110 MMHG | DIASTOLIC BLOOD PRESSURE: 65 MMHG

## 2023-05-04 DIAGNOSIS — S71.101D OPEN WOUND OF RIGHT THIGH, SUBSEQUENT ENCOUNTER: Primary | ICD-10-CM

## 2023-05-04 PROCEDURE — 99212 OFFICE O/P EST SF 10 MIN: CPT

## 2023-05-04 RX ORDER — LIDOCAINE HYDROCHLORIDE 20 MG/ML
JELLY TOPICAL PRN
Status: DISCONTINUED | OUTPATIENT
Start: 2023-05-04 | End: 2023-05-05 | Stop reason: HOSPADM

## 2023-05-04 RX ORDER — GINSENG 100 MG
CAPSULE ORAL ONCE
OUTPATIENT
Start: 2023-05-04 | End: 2023-05-04

## 2023-05-04 RX ORDER — GENTAMICIN SULFATE 1 MG/G
OINTMENT TOPICAL ONCE
OUTPATIENT
Start: 2023-05-04 | End: 2023-05-04

## 2023-05-04 RX ORDER — CLOBETASOL PROPIONATE 0.5 MG/G
OINTMENT TOPICAL ONCE
OUTPATIENT
Start: 2023-05-04 | End: 2023-05-04

## 2023-05-04 RX ORDER — BETAMETHASONE DIPROPIONATE 0.05 %
OINTMENT (GRAM) TOPICAL ONCE
OUTPATIENT
Start: 2023-05-04 | End: 2023-05-04

## 2023-05-04 RX ORDER — BACITRACIN, NEOMYCIN, POLYMYXIN B 400; 3.5; 5 [USP'U]/G; MG/G; [USP'U]/G
OINTMENT TOPICAL ONCE
OUTPATIENT
Start: 2023-05-04 | End: 2023-05-04

## 2023-05-04 RX ORDER — BACITRACIN ZINC AND POLYMYXIN B SULFATE 500; 1000 [USP'U]/G; [USP'U]/G
OINTMENT TOPICAL ONCE
OUTPATIENT
Start: 2023-05-04 | End: 2023-05-04

## 2023-05-04 ASSESSMENT — PAIN SCALES - GENERAL: PAINLEVEL_OUTOF10: 0

## 2023-05-04 NOTE — WOUND CARE
Discharge Instructions from  [address] [facility]  [address], [facility]0 [facility]  [phone] Fax [phone]    NAME:  Marcello Sky  YOB: 1970  MEDICAL RECORD NUMBER:  [id number]  DATE:  5/4/2023    Wound Cleansing:   Do not scrub or use excessive force. Cleanse wound prior to applying a clean dressing with:    [x] Wound Cleanser       Dressings:           Wound Location Right Thigh and Perineum   [x] Apply Primary Dressing:       [x] Alginate with Silver Right Thigh wound     [x] Collagen with Silver to all wounds. [x] Mepilex Border to Right Tight wound only    [x] Mepitel one to all wounds         [x] Change dressing:    [x] Two times a week          Dietary:  [x] Diet as tolerated:   [x] Increase Protein:    Activity:  [x] Activity as tolerated:  [x] Patient has no activity restrictions                                              Return Appointment:  [x] Wound and dressing supply provider:   [x] ECF or Home Healthcare:  [x] Wound Assessment: [x] Physician or NP scheduled for Wound Assessment:   [x] Return Appointment: 2 Week(s)       Electronically signed Hernando Sena LPN on 5/9/7243 at [address] [facility]: Should you experience any significant changes in your wound(s) or have questions about your wound care, please contact the [address] [place] [address] at [address] [place] [address] 8:00 am - 4:30. If you need help with your wound outside these hours and cannot wait until we are again available, contact your PCP or go to the hospital emergency room. PLEASE NOTE: IF YOU ARE UNABLE TO OBTAIN WOUND SUPPLIES, CONTINUE TO USE THE SUPPLIES YOU HAVE AVAILABLE UNTIL YOU ARE ABLE TO REACH US. IT IS MOST IMPORTANT TO KEEP THE WOUND COVERED AT ALL TIMES.      Physician Signature:_______________________    Date: ___________ Time:  ____________

## 2023-05-04 NOTE — FLOWSHEET NOTE
05/04/23 1049   Wound 04/20/23 Perineum   Date First Assessed/Time First Assessed: 04/20/23 1102   Primary Wound Type: Other (comment)  Location: Perineum   Wound Image    Wound Etiology Other   Dressing Status Intact; New drainage noted   Wound Cleansed Wound cleanser   Dressing/Treatment Collagen with Ag;Silicone border   Offloading for Diabetic Foot Ulcers Offloading not required   Dressing Change Due 05/11/23   Wound Length (cm) 0.2 cm   Wound Width (cm) 0.5 cm   Wound Depth (cm) 1.5 cm   Wound Surface Area (cm^2) 0.1 cm^2   Change in Wound Size % (l*w) 58.33   Wound Volume (cm^3) 0.15 cm^3   Wound Healing % 88   Post-Procedure Length (cm) 0.2 cm   Post-Procedure Width (cm) 0.5 cm   Post-Procedure Depth (cm) 1.5 cm   Post-Procedure Surface Area (cm^2) 0.1 cm^2   Post-Procedure Volume (cm^3) 0.15 cm^3   Wound Assessment Devitalized tissue   Drainage Amount Small   Drainage Description Serosanguinous   Odor None   Eileen-wound Assessment Intact   Margins Defined edges   Wound Thickness Description not for Pressure Injury Full thickness   Wound 02/03/23 Thigh Anterior;Right   Date First Assessed/Time First Assessed: 02/03/23 1130   Primary Wound Type: Surgical Type  Location: Thigh  Wound Location Orientation: Anterior;Right   Wound Image    Wound Etiology Non-Healing Surgical   Dressing Status Intact;Dry   Wound Cleansed Wound cleanser   Dressing/Treatment Collagen with Ag;Silicone border   Offloading for Diabetic Foot Ulcers Offloading not required   Dressing Change Due 05/11/23   Wound Length (cm) 0.5 cm   Wound Width (cm) 0.5 cm   Wound Depth (cm) 1.5 cm   Wound Surface Area (cm^2) 0.25 cm^2   Change in Wound Size % (l*w) 96   Wound Volume (cm^3) 0.375 cm^3   Wound Healing % 97   Wound Assessment Granulation tissue   Drainage Amount Scant   Drainage Description Serosanguinous   Odor None   Eileen-wound Assessment Intact   Margins Defined edges   Wound Thickness Description not for Pressure Injury Full thickness

## 2023-05-04 NOTE — DISCHARGE INSTRUCTIONS
Discharge Instructions from  [address] [facility]  [address] [place], [address], [facility]0 [facility]  [phone] Fax [phone]    NAME:  Ardie Severs  YOB: 1970  MEDICAL RECORD NUMBER:  [id number]  DATE:  @ED@    Wound Cleansing:   Do not scrub or use excessive force. Cleanse wound prior to applying a clean dressing with:  [] Normal Saline [] Keep Wound Dry in Shower    [x] Wound Cleanser   [] Cleanse wound with Mild Soap & Water  [] May Shower at Discharge   [] Other:      Topical Treatments:  Do not apply lotions, creams, or ointments to wound bed unless directed. [] Apply moisturizing lotion to skin surrounding the wound prior to dressing change. [x] Apply antifungal ointment to skin surrounding the wound prior to dressing change.  [] Apply thin film of moisture barrier ointment to skin immediately around wound. [] Other:       Dressings:           Wound Location Right Thigh  [x] Apply Primary Dressing:       [] MediHoney Gel [x] Alginate with Silver [] Alginate   [] Collagen [x] Collagen with Silver   [] Santyl with Moisten saline gauze     [] Hydrocolloid   [] MediHoney Alginate [] Foam with Silver   [] Foam   [] Hydrofera Blue    [x] Mepilex Border    [] Moisten with Saline [] Hydrogel [] Mepitel     [] Bactroban/Mupirocin [] Polysporin  [] Other:    [] Pack wound loosely with  [] Iodoform   [] Plain Packing  [] Other   [x] Cover and Secure with:     [] Gauze [] Kera [] Kerlix   [] Ace Wrap [] Cover Roll Tape [x] ABD     [] Other:    Avoid contact of tape with skin.   [x] Change dressing: [] Daily    [] Every Other Day [] Three times per week   [x] Once a week [] Do Not Change Dressing   [] Other:       Dietary:  [x] Diet as tolerated: [] Calorie Diabetic Diet: [] No Added Salt:  [x] Increase Protein: [] Other:   Activity:  [x] Activity as tolerated:  [x] Patient has no activity restrictions     [] Strict Bedrest: [] Remain off Work:     [] May return to

## 2023-05-04 NOTE — WOUND CARE
[address] [facility]   Progress Note     Referring Provider: Post hospital DC--inpatient consult Dr Sudhakar Wild  Reason for Referral: Post hospital discharged, wound care    Lonza Oscar Gardner  MEDICAL RECORD NUMBER:  [id number]  AGE: 46 y.o. GENDER: male  : 1970  EPISODE DATE:  2023    Chief complaint and reason for visit:     FU wound check post surgical for an abscess-- R proximal thigh from before  FU wound check perineum post op -- new- 23        HISTORY of PRESENT ILLNESS HPI     Alberto Padilla is a 46 y.o. male with PMH of HTN, Morbid Obesity, GERD, Pul hypertension, with admission to [facility] from  to 23. He was admitted with sepsis and had right upper thigh cellulitis and abscess. He was treated with IV antibiotics and on 23 underwent I and D of deep abscess. OR culture remain negative. He was treated with IV ceftriaxone for 2 weeks and is here for wound care of his thigh. Wound duration: since (date) 23 - for R thigh. History of Wound Context: Surgical-- I and D  Pertinent associated symptoms: pain severity: intermittent, moderate    Perineal abscess- surgical wound 23    Contributing factors:  Pre Diabetes mellitis: HB A1C- 6.6%( 2023)  Smoking: No   Vascular disease: N/A    Today's visit 2023  :     Here for FU , no new complaint. No new medications  No new lesions. He asks regarding swimming / bathing etc.   DW with paent    Review of Systems    Denies fever or chills or rigors  Denies  nausea or vomiting or abd pain or diarrhea  Denies chest pain, productive cough  Denies rash/ itching - no new skin lesions.      PAST MEDICAL HISTORY        Diagnosis Date    Arthritis     spine    Chronic pain     lumbar    Diabetes mellitus (HCC)     GERD (gastroesophageal reflux disease)     History of blood transfusion     Hx of blood clots     Hypertension     Sleep apnea     CPAP in the past, was 300 lbs, lost weight       SOCIAL HISTORY  Social

## 2023-05-18 ENCOUNTER — HOSPITAL ENCOUNTER (OUTPATIENT)
Facility: HOSPITAL | Age: 53
Discharge: HOME | End: 2023-05-18
Payer: COMMERCIAL

## 2023-05-18 VITALS
SYSTOLIC BLOOD PRESSURE: 149 MMHG | HEART RATE: 58 BPM | DIASTOLIC BLOOD PRESSURE: 80 MMHG | RESPIRATION RATE: 16 BRPM | OXYGEN SATURATION: 99 % | TEMPERATURE: 97.6 F

## 2023-05-18 DIAGNOSIS — S71.101D OPEN WOUND OF RIGHT THIGH, SUBSEQUENT ENCOUNTER: Primary | ICD-10-CM

## 2023-05-18 PROCEDURE — 99212 OFFICE O/P EST SF 10 MIN: CPT

## 2023-05-18 RX ORDER — GENTAMICIN SULFATE 1 MG/G
OINTMENT TOPICAL ONCE
OUTPATIENT
Start: 2023-05-18 | End: 2023-05-18

## 2023-05-18 RX ORDER — BETAMETHASONE DIPROPIONATE 0.05 %
OINTMENT (GRAM) TOPICAL ONCE
OUTPATIENT
Start: 2023-05-18 | End: 2023-05-18

## 2023-05-18 RX ORDER — CLOBETASOL PROPIONATE 0.5 MG/G
OINTMENT TOPICAL ONCE
OUTPATIENT
Start: 2023-05-18 | End: 2023-05-18

## 2023-05-18 RX ORDER — GINSENG 100 MG
CAPSULE ORAL ONCE
OUTPATIENT
Start: 2023-05-18 | End: 2023-05-18

## 2023-05-18 RX ORDER — BACITRACIN, NEOMYCIN, POLYMYXIN B 400; 3.5; 5 [USP'U]/G; MG/G; [USP'U]/G
OINTMENT TOPICAL ONCE
OUTPATIENT
Start: 2023-05-18 | End: 2023-05-18

## 2023-05-18 RX ORDER — BACITRACIN ZINC AND POLYMYXIN B SULFATE 500; 1000 [USP'U]/G; [USP'U]/G
OINTMENT TOPICAL ONCE
OUTPATIENT
Start: 2023-05-18 | End: 2023-05-18

## 2023-05-18 ASSESSMENT — PAIN SCALES - WONG BAKER: WONGBAKER_NUMERICALRESPONSE: 0

## 2023-05-18 NOTE — FLOWSHEET NOTE
Discharge Instructions from  [address] [facility]  [address] [place], [address], [facility]0 [facility]  [phone] Fax [phone]    NAME:  Marie Medrano  YOB: 1970  MEDICAL RECORD NUMBER:  [id number]  DATE:  5/18/2023    Wound Cleansing:   Do not scrub or use excessive force. Cleanse wound prior to applying a clean dressing with:  [] Normal Saline [] Keep Wound Dry in Shower    [x] Wound Cleanser   [] Cleanse wound with Mild Soap & Water  [] May Shower at Discharge   [] Other:      Topical Treatments:  Do not apply lotions, creams, or ointments to wound bed unless directed. [] Apply moisturizing lotion to skin surrounding the wound prior to dressing change. [x] Apply antifungal ointment to skin surrounding the wound prior to dressing change. Dressings:           Wound Location Right hip and Perineum area   [x] Apply Primary Dressing:       [x] Alginate with Silver    [x] Collagen with Silver      [x] Pack wound loosely with    [x] Other       Avoid contact of tape with skin.   [x] Change dressing: [x] Daily          Dietary:  [x] Diet as tolerated: [] Calorie Diabetic Diet: [] No Added Salt:  [x] Increase Protein: [] Other:   Activity:  [x] Activity as tolerated:  [x] Patient has no activity restrictions     [] Strict Bedrest: [] Remain off Work:     [] May return to full duty work:                                   [] Return to work with restrictions:             Return Appointment:  [x] Wound and dressing supply provider:   [] ECF or Home Healthcare:  [x] Wound Assessment: [x] Physician or NP scheduled for Wound Assessment:   [x] Return Appointment: 2 Week(s)  [] Ordered tests:      Electronically signed Kaye Sheth LPN on 7/06/9386 at [place] [address]: Should you experience any significant changes in your wound(s) or have questions about your wound care, please contact the [facility] at [facility] -

## 2023-05-18 NOTE — WOUND CARE
Wound Care Discharge Instructions  [address]  [address] [facility]                                   Telephone: [phone]    NAME:  Jose Ramon Winchester OF BIRTH:  1970  MEDICAL RECORD NUMBER:  [id number]  DATE:  5/18/2023    Home Health      Patient is no longer in need of Home Health as of this date 05/18/2023. [address] YOU FOR  YOUR SERVICE TO MR. Clancy Sandi RICARDO.       Electronically signed by Dandre Pompa LPN on 4/07/4072 at 6:42 PM

## 2023-05-18 NOTE — DISCHARGE INSTRUCTIONS
Discharge Instructions from  [address] [facility]  [address] [place], [address] [facility]  [phone] Fax [phone]    NAME:  Jimena Yeh  YOB: 1970  MEDICAL RECORD NUMBER:  [id number]  DATE:  @ED@    Wound Cleansing:   Do not scrub or use excessive force. Cleanse wound prior to applying a clean dressing with:  [] Normal Saline [] Keep Wound Dry in Shower    [x] Wound Cleanser   [] Cleanse wound with Mild Soap & Water    [] Other:      Topical Treatments:  Do not apply lotions, creams, or ointments to wound bed unless directed. [] Apply moisturizing lotion to skin surrounding the wound prior to dressing change. [x] Apply antifungal ointment to skin surrounding the wound prior to dressing change. [x] Apply thin film of moisture barrier ointment to skin immediately around wound. [] Other:       Dressings:           Wound Location Right hip and Perineum   [x] Apply Primary Dressing:       [x] Alginate with Silver    [x] Collagen with Silver      [x] Pack wound loosely with       Avoid contact of tape with skin.   [x] Change dressing: [x] Daily         Dietary:  [x] Diet as tolerated: [x] Calorie Diabetic Diet:   [x] Increase Protein:    Activity:  [x] Activity as tolerated:  [x] Patient has no activity restrictions     [] Strict Bedrest: [] Remain off Work:     [] May return to full duty work:                                   [] Return to work with restrictions:             Return Appointment:  [x] Wound and dressing supply provider:   [] ECF or Home Healthcare:  [x] Wound Assessment: [x] Physician or NP scheduled for Wound Assessment:   [x] Return Appointment: 2 Week(s)  [] Ordered tests:      Electronically signed Cecile Yang LPN on 5/80/2951 at 3:15 PM     [address] [place] [address] Information: Should you experience any significant changes in your wound(s) or have questions about your wound care, please contact the [address]

## 2023-05-19 NOTE — WOUND CARE
[address] [facility]   Progress Note     Referring Provider: Post hospital DC--inpatient consult Dr Sylvia Coker  Reason for Referral: Post hospital discharged, wound care    Christiano Gardner  MEDICAL RECORD NUMBER:  [id number]  AGE: 48 y.o. GENDER: male  : 1970  EPISODE DATE:  2023    Chief complaint and reason for visit:     FU wound check post surgical for an abscess-- R proximal thigh from before  FU wound check perineum post op -- new- 23        HISTORY of PRESENT ILLNESS HPI     Jennifer Dominguez is a 48 y.o. male with PMH of HTN, Morbid Obesity, GERD, Pul hypertension, with admission to THE [facility] from  to 23. He was admitted with sepsis and had right upper thigh cellulitis and abscess. He was treated with IV antibiotics and on 23 underwent I and D of deep abscess. OR culture remain negative. He was treated with IV ceftriaxone for 2 weeks and is here for wound care of his thigh. Wound duration: since (date) 23 - for R thigh. History of Wound Context: Surgical-- I and D  Pertinent associated symptoms: pain severity: intermittent, moderate    Perineal abscess- surgical wound 23    Contributing factors:  Pre Diabetes mellitis: HB A1C- 6.6%( 2023)  Smoking: No   Vascular disease: N/A    Today's visit 2023:     Here for FU , no new complaint. No new skin lesion. Wound sizes getting smaller. No new medications. Review of Systems    Denies fever or chills or rigors  Denies  nausea or vomiting or abd pain or diarrhea  Denies chest pain, productive cough  Denies rash/ itching - no new skin lesions.      PAST MEDICAL HISTORY        Diagnosis Date    Arthritis     spine    Chronic pain     lumbar    Diabetes mellitus (HCC)     GERD (gastroesophageal reflux disease)     History of blood transfusion     Hx of blood clots     Hypertension     Sleep apnea     CPAP in the past, was 300 lbs, lost weight       SOCIAL HISTORY  Social History     Tobacco Use

## 2023-05-28 LAB
ACID FAST STN SPEC: NEGATIVE
MYCOBACTERIUM SPEC QL CULT: NEGATIVE
SPECIMEN PREPARATION: NORMAL
SPECIMEN SOURCE: NORMAL

## 2023-06-01 ENCOUNTER — HOSPITAL ENCOUNTER (OUTPATIENT)
Facility: HOSPITAL | Age: 53
Discharge: HOME | End: 2023-06-01
Payer: COMMERCIAL

## 2023-06-01 VITALS
HEART RATE: 57 BPM | RESPIRATION RATE: 16 BRPM | SYSTOLIC BLOOD PRESSURE: 150 MMHG | TEMPERATURE: 98.8 F | DIASTOLIC BLOOD PRESSURE: 68 MMHG

## 2023-06-01 DIAGNOSIS — S71.101D OPEN WOUND OF RIGHT THIGH, SUBSEQUENT ENCOUNTER: Primary | ICD-10-CM

## 2023-06-01 PROCEDURE — 99212 OFFICE O/P EST SF 10 MIN: CPT

## 2023-06-01 RX ORDER — GENTAMICIN SULFATE 1 MG/G
OINTMENT TOPICAL ONCE
OUTPATIENT
Start: 2023-06-01 | End: 2023-06-01

## 2023-06-01 RX ORDER — GINSENG 100 MG
CAPSULE ORAL ONCE
OUTPATIENT
Start: 2023-06-01 | End: 2023-06-01

## 2023-06-01 RX ORDER — BACITRACIN ZINC AND POLYMYXIN B SULFATE 500; 1000 [USP'U]/G; [USP'U]/G
OINTMENT TOPICAL ONCE
OUTPATIENT
Start: 2023-06-01 | End: 2023-06-01

## 2023-06-01 RX ORDER — CLOBETASOL PROPIONATE 0.5 MG/G
OINTMENT TOPICAL ONCE
OUTPATIENT
Start: 2023-06-01 | End: 2023-06-01

## 2023-06-01 RX ORDER — BETAMETHASONE DIPROPIONATE 0.05 %
OINTMENT (GRAM) TOPICAL ONCE
OUTPATIENT
Start: 2023-06-01 | End: 2023-06-01

## 2023-06-01 RX ORDER — BACITRACIN, NEOMYCIN, POLYMYXIN B 400; 3.5; 5 [USP'U]/G; MG/G; [USP'U]/G
OINTMENT TOPICAL ONCE
OUTPATIENT
Start: 2023-06-01 | End: 2023-06-01

## 2023-06-01 ASSESSMENT — PAIN SCALES - GENERAL: PAINLEVEL_OUTOF10: 0

## 2023-06-01 NOTE — FLOWSHEET NOTE
06/01/23 1535   Wound 04/20/23 Perineum   Date First Assessed/Time First Assessed: 04/20/23 1102   Primary Wound Type:  Other (comment)  Location: Perineum   Wound Image    Wound Etiology Other   Dressing Status Other (Comment)  (open to air)   Dressing/Treatment Open to air   Wound Length (cm)   (wound clinically closed)   Wound Assessment Epithelialization   Drainage Amount None   Wound 02/03/23 Thigh Anterior;Right   Date First Assessed/Time First Assessed: 02/03/23 1130   Primary Wound Type: Surgical Type  Location: Thigh  Wound Location Orientation: Anterior;Right   Wound Image    Wound Etiology Non-Healing Surgical   Dressing Status   (open to air)   Wound Length (cm)   (wound is clinically closed)   Wound Assessment Epithelialization   Drainage Amount None   Odor None   Eileen-wound Assessment Intact

## 2023-06-01 NOTE — DISCHARGE INSTRUCTIONS
Discharge Instructions from  [address] [facility]  [address] [place], [address], [facility]  [phone] Fax [phone]    NAME:  Niles Brittle  YOB: 1970  MEDICAL RECORD NUMBER:  [id number]  DATE: 6/1/2023    Wound Cleansing:   Do not scrub or use excessive force. Cleanse wound prior to applying a clean dressing with:  [] Normal Saline [] Keep Wound Dry in Shower    [] Wound Cleanser   [] Cleanse wound with Mild Soap & Water  [] May Shower at Discharge   [] Other:      Topical Treatments:  Do not apply lotions, creams, or ointments to wound bed unless directed. [] Apply moisturizing lotion to skin surrounding the wound prior to dressing change.  [] Apply antifungal ointment to skin surrounding the wound prior to dressing change.  [] Apply thin film of moisture barrier ointment to skin immediately around wound. [] Other:       Dressings:           Wound Location Right hip and Perineum   [] Apply Primary Dressing:       [] MediHoney Gel [] Alginate with Silver [] Alginate   [] Collagen [] Collagen with Silver   [] Santyl with Moisten saline gauze     [] Hydrocolloid   [] MediHoney Alginate [] Foam with Silver   [] Foam   [] Hydrofera Blue    [] Mepilex Border    [] Moisten with Saline [] Hydrogel [] Mepitel     [] Bactroban/Mupirocin [] Polysporin  [] Other:    [] Pack wound loosely with  [] Iodoform   [] Plain Packing  [] Other   [] Cover and Secure with:     [] Gauze [] Kera [] Kerlix   [] Ace Wrap [] Cover Roll Tape [] ABD     [] Other:    Avoid contact of tape with skin. [] Change dressing: [] Daily    [] Every Other Day [] Three times per week   [] Once a week [] Do Not Change Dressing   [x] Other: No dressings needed        .       Dietary:  [x] Diet as tolerated: [] Calorie Diabetic Diet: [] No Added Salt:  [] Increase Protein: [] Other:       Activity:  [x] Activity as tolerated:  [] Patient has no activity restrictions     [] Strict Bedrest: []

## 2025-05-12 ENCOUNTER — HOSPITAL ENCOUNTER (OUTPATIENT)
Facility: HOSPITAL | Age: 55
Discharge: HOME | End: 2025-05-15
Payer: MEDICAID

## 2025-05-12 DIAGNOSIS — M96.1 FAILED BACK SYNDROME: ICD-10-CM

## 2025-05-12 DIAGNOSIS — M47.819 FACET ARTHROPATHY: ICD-10-CM

## 2025-05-12 DIAGNOSIS — M54.16 LUMBAR RADICULOPATHY: ICD-10-CM

## 2025-05-12 PROCEDURE — 72131 CT LUMBAR SPINE W/O DYE: CPT

## 2025-05-12 PROCEDURE — 72128 CT CHEST SPINE W/O DYE: CPT

## 2025-06-02 ENCOUNTER — OFFICE VISIT (OUTPATIENT)
Age: 55
End: 2025-06-02
Payer: MEDICAID

## 2025-06-02 VITALS
BODY MASS INDEX: 43.78 KG/M2 | OXYGEN SATURATION: 98 % | SYSTOLIC BLOOD PRESSURE: 109 MMHG | HEART RATE: 88 BPM | WEIGHT: 305.8 LBS | HEIGHT: 70 IN | TEMPERATURE: 97.5 F | DIASTOLIC BLOOD PRESSURE: 62 MMHG

## 2025-06-02 DIAGNOSIS — E11.9 TYPE 2 DIABETES MELLITUS WITHOUT COMPLICATION, WITHOUT LONG-TERM CURRENT USE OF INSULIN (HCC): ICD-10-CM

## 2025-06-02 DIAGNOSIS — M19.90 ARTHRITIS: ICD-10-CM

## 2025-06-02 DIAGNOSIS — I10 HYPERTENSION, UNSPECIFIED TYPE: ICD-10-CM

## 2025-06-02 DIAGNOSIS — E88.810 METABOLIC SYNDROME X: ICD-10-CM

## 2025-06-02 DIAGNOSIS — E66.01 MORBID OBESITY WITH BMI OF 40.0-44.9, ADULT (HCC): ICD-10-CM

## 2025-06-02 DIAGNOSIS — G47.30 SLEEP APNEA, UNSPECIFIED TYPE: ICD-10-CM

## 2025-06-02 DIAGNOSIS — E66.01 MORBID OBESITY (HCC): Primary | ICD-10-CM

## 2025-06-02 DIAGNOSIS — K21.9 GASTROESOPHAGEAL REFLUX DISEASE, UNSPECIFIED WHETHER ESOPHAGITIS PRESENT: ICD-10-CM

## 2025-06-02 DIAGNOSIS — Z86.718 HX OF BLOOD CLOTS: ICD-10-CM

## 2025-06-02 DIAGNOSIS — Z92.89 HISTORY OF BLOOD TRANSFUSION: ICD-10-CM

## 2025-06-02 PROCEDURE — 99205 OFFICE O/P NEW HI 60 MIN: CPT | Performed by: SPECIALIST

## 2025-06-02 PROCEDURE — 3078F DIAST BP <80 MM HG: CPT | Performed by: SPECIALIST

## 2025-06-02 PROCEDURE — 3074F SYST BP LT 130 MM HG: CPT | Performed by: SPECIALIST

## 2025-06-02 RX ORDER — ALFUZOSIN HYDROCHLORIDE 10 MG/1
10 TABLET, EXTENDED RELEASE ORAL DAILY
COMMUNITY
Start: 2025-05-07

## 2025-06-02 RX ORDER — SEMAGLUTIDE 2.68 MG/ML
INJECTION, SOLUTION SUBCUTANEOUS
COMMUNITY
Start: 2024-12-20

## 2025-06-02 RX ORDER — EMPAGLIFLOZIN 25 MG/1
25 TABLET, FILM COATED ORAL EVERY MORNING
COMMUNITY

## 2025-06-02 RX ORDER — SERTRALINE HYDROCHLORIDE 100 MG/1
100 TABLET, FILM COATED ORAL DAILY
COMMUNITY
Start: 2025-05-07

## 2025-06-02 RX ORDER — PREGABALIN 75 MG/1
75 CAPSULE ORAL 2 TIMES DAILY
COMMUNITY

## 2025-06-02 RX ORDER — HYDRALAZINE HYDROCHLORIDE 50 MG/1
TABLET, FILM COATED ORAL
COMMUNITY

## 2025-06-02 RX ORDER — IBUPROFEN 800 MG/1
TABLET, FILM COATED ORAL
COMMUNITY

## 2025-06-02 RX ORDER — VERAPAMIL HYDROCHLORIDE 120 MG/1
TABLET, FILM COATED, EXTENDED RELEASE ORAL
COMMUNITY
Start: 2025-05-12

## 2025-06-02 RX ORDER — DICLOFENAC SODIUM 75 MG/1
75 TABLET, DELAYED RELEASE ORAL 2 TIMES DAILY
COMMUNITY

## 2025-06-02 RX ORDER — PREDNISONE 10 MG/1
10 TABLET ORAL DAILY
COMMUNITY

## 2025-06-02 RX ORDER — TESTOSTERONE CYPIONATE 200 MG/ML
200 INJECTION, SOLUTION INTRAMUSCULAR
COMMUNITY

## 2025-06-02 RX ORDER — OXYCODONE AND ACETAMINOPHEN 5; 325 MG/1; MG/1
1 TABLET ORAL EVERY 6 HOURS PRN
COMMUNITY

## 2025-06-02 NOTE — PROGRESS NOTES
we may choose to postpone their surgery.      Hypertension - the patient understands, and we have discussed in detail, that this is an active acute health problem that has a multifactorial effect on their body from the standpoint of healing and complications related to surgery.  They understand that uncontrolled hypertension affects other organ processes and this leads to risk associated with surgical procedures.  They understand that in addition to hypertension, once they develop other health issues, their risk is exponentially worse.  Currently they understand that this likely one of the single most important items that they need to control in the perioperative process.  They understand that the [facility] protocol is that patients entering the operative suite should have a blood pressure reading less than 160/90 prior to surgery.  Elevated readings today in office above 160/90 are recommended emergent PCP follow-up or if symptomatic to proceed to the ED. The patient has a clear understanding of how weight loss improves hypertension as a whole, but also they understand that there is a significant genetic component to this disease process. We will monitor the patient’s blood pressure while in the hospital and the plan would be to continue those medications postoperatively.  If a diuretic is being used we will stop them on discharge to prevent dehydration particularly with the sleeve gastrectomy and the gastric bypass procedures.  They will be instructed to monitor their blood pressure postoperatively while at home and notify their primary care physician in the event of any significantly high or uncharacteristic readings.      Hyperlipidemia - The patient understands that studies show that almost all patient will realize an improvement in their lipid profile with weight loss that occurs with these procedures.   They however also understand that hyperlipidemia is a multifactorial disease particularly as it pertains to

## 2025-06-11 ENCOUNTER — APPOINTMENT (OUTPATIENT)
Facility: HOSPITAL | Age: 55
End: 2025-06-11
Payer: MEDICAID

## 2025-06-11 ENCOUNTER — HOSPITAL ENCOUNTER (OUTPATIENT)
Facility: HOSPITAL | Age: 55
Setting detail: SPECIMEN
Discharge: HOME | End: 2025-06-14

## 2025-06-11 ENCOUNTER — HOSPITAL ENCOUNTER (OUTPATIENT)
Facility: HOSPITAL | Age: 55
Setting detail: OUTPATIENT SURGERY
Discharge: HOME | End: 2025-06-14
Payer: MEDICAID

## 2025-06-11 ENCOUNTER — HOSPITAL ENCOUNTER (OUTPATIENT)
Facility: HOSPITAL | Age: 55
Discharge: HOME | End: 2025-06-14
Payer: MEDICAID

## 2025-06-11 VITALS
DIASTOLIC BLOOD PRESSURE: 75 MMHG | OXYGEN SATURATION: 95 % | HEART RATE: 75 BPM | BODY MASS INDEX: 43.54 KG/M2 | SYSTOLIC BLOOD PRESSURE: 128 MMHG | HEIGHT: 70 IN | TEMPERATURE: 98.3 F | RESPIRATION RATE: 18 BRPM | WEIGHT: 304.1 LBS

## 2025-06-11 DIAGNOSIS — E66.01 MORBID OBESITY (HCC): ICD-10-CM

## 2025-06-11 LAB — LABCORP SPECIMEN COLLECTION: NORMAL

## 2025-06-11 PROCEDURE — 99001 SPECIMEN HANDLING PT-LAB: CPT

## 2025-06-11 PROCEDURE — 74240 X-RAY XM UPR GI TRC 1CNTRST: CPT

## 2025-06-11 PROCEDURE — 74240 X-RAY XM UPR GI TRC 1CNTRST: CPT | Performed by: SPECIALIST

## 2025-06-11 PROCEDURE — 74220 X-RAY XM ESOPHAGUS 1CNTRST: CPT

## 2025-06-11 PROCEDURE — 2500000003 HC RX 250 WO HCPCS: Performed by: SPECIALIST

## 2025-06-11 RX ADMIN — BARIUM SULFATE 100 ML: 960 POWDER, FOR SUSPENSION ORAL at 13:16

## 2025-06-11 NOTE — PROCEDURES
Patient:Bud Gardner   : 1970  Medical Record Number:[id number]            PREPROCEDURE DIAGNOSIS: This patient is preoperative for laparoscopic sleeve gastrectomy procedure with a history of  reflux disease.    POSTPROCEDURE DIAGNOSIS: This patient is preoperative for laparoscopic sleeve gastrectomy procedure with a history of  reflux disease.      PROCEDURES PERFORMED: Upper GI study with barium.    ESTIMATED BLOOD LOSS: None.    SPECIMENS: None.    STATEMENT OF MEDICAL NECESSITY: The patient is a patient with a  longstanding history of obesity. They are now considering the laparoscopic sleeve gastrectomy procedure as a means of surgical weight control and due to their history of reflux disease and are being assessed preoperatively for such.    DESCRIPTION OF PROCEDURE: The patient was brought to the fluoroscopy unit and  was given thin barium. On swallowing of barium, they were noted to have  normal peristalsis of their esophagus. They had prompt filling of distal  esophagus with tapering into the gastroesophageal junction. There was no evidence of a hiatal hernia present. Contrast then filled the gastric cardia, fundus,body and pre pyloric region with no abnormalities noted. Contrast then exited the pylorus in normal fashion. No obstruction was noted. There was no evidence of reflux noted.    (normal anatomy)    Vinicisu Schreiber MD

## 2025-06-11 NOTE — PROGRESS NOTES
[facility] UGI FOCUS NOTE      Date:  6/11/2025  Time:  1:13 PM    Patient:  Bud Gardner  Procedure:  UGI      Patient Questions  Lap Band Adjustment Patient Questionnaire:  If female, are you pregnant? []Yes     [x]No  Tolerates thick liquids:  []Well   [x]1     []2     []3     []4     []5     []Poorly  Tolerates red meat:  []Well   [x]1     []2     []3     []4     []5     [] Poorly  Tolerates chicken:  []Well   [x]1     []2     []3     []4     []5     []Poorly  Tolerates fish:   []Well   [x]1     []2     []3     []4     []5     []Poorly  Hunger is:   []Well Controlled     []1     [x]2     []3     []4     []5      [] Poorly Controlled  Nightime regurgitation:  []Never     []1     [x]2     []3     []4     []5     []Frequently    Lap Band Info:  Band Type  []Realize     []Realize-C     []AP     []VG     []10cm     []Unknown  []Other      Comments:        Delmi Pace RN

## 2025-06-12 LAB
25(OH)D3+25(OH)D2 SERPL-MCNC: 41.2 NG/ML (ref 30–100)
ALBUMIN SERPL-MCNC: 4.2 G/DL (ref 3.8–4.9)
ALP SERPL-CCNC: 40 IU/L (ref 44–121)
ALT SERPL-CCNC: 42 IU/L (ref 0–44)
AST SERPL-CCNC: 25 IU/L (ref 0–40)
BASOPHILS # BLD AUTO: 0.1 X10E3/UL (ref 0–0.2)
BASOPHILS NFR BLD AUTO: 1 %
BILIRUB SERPL-MCNC: 0.4 MG/DL (ref 0–1.2)
BUN SERPL-MCNC: 20 MG/DL (ref 6–24)
BUN/CREAT SERPL: 17 (ref 9–20)
CALCIUM SERPL-MCNC: 9.5 MG/DL (ref 8.7–10.2)
CHLORIDE SERPL-SCNC: 106 MMOL/L (ref 96–106)
CO2 SERPL-SCNC: 20 MMOL/L (ref 20–29)
CREAT SERPL-MCNC: 1.17 MG/DL (ref 0.76–1.27)
EGFRCR SERPLBLD CKD-EPI 2021: 74 ML/MIN/1.73
EOSINOPHIL # BLD AUTO: 0.2 X10E3/UL (ref 0–0.4)
EOSINOPHIL NFR BLD AUTO: 3 %
ERYTHROCYTE [DISTWIDTH] IN BLOOD BY AUTOMATED COUNT: 12.1 % (ref 11.6–15.4)
FERRITIN SERPL-MCNC: 70 NG/ML (ref 30–400)
FOLATE SERPL-MCNC: 12.2 NG/ML
GLOBULIN SER CALC-MCNC: 1.9 G/DL (ref 1.5–4.5)
GLUCOSE SERPL-MCNC: 100 MG/DL (ref 70–99)
HBA1C MFR BLD: 6 % (ref 4.8–5.6)
HCT VFR BLD AUTO: 46 % (ref 37.5–51)
HGB BLD-MCNC: 14.2 G/DL (ref 13–17.7)
IMM GRANULOCYTES # BLD AUTO: 0 X10E3/UL (ref 0–0.1)
IMM GRANULOCYTES NFR BLD AUTO: 0 %
IRON SERPL-MCNC: 71 UG/DL (ref 38–169)
LYMPHOCYTES # BLD AUTO: 1 X10E3/UL (ref 0.7–3.1)
LYMPHOCYTES NFR BLD AUTO: 18 %
MCH RBC QN AUTO: 29 PG (ref 26.6–33)
MCHC RBC AUTO-ENTMCNC: 30.9 G/DL (ref 31.5–35.7)
MCV RBC AUTO: 94 FL (ref 79–97)
MONOCYTES # BLD AUTO: 0.4 X10E3/UL (ref 0.1–0.9)
MONOCYTES NFR BLD AUTO: 6 %
NEUTROPHILS # BLD AUTO: 4 X10E3/UL (ref 1.4–7)
NEUTROPHILS NFR BLD AUTO: 72 %
PLATELET # BLD AUTO: 164 X10E3/UL (ref 150–450)
POTASSIUM SERPL-SCNC: 4.1 MMOL/L (ref 3.5–5.2)
PROT SERPL-MCNC: 6.1 G/DL (ref 6–8.5)
RBC # BLD AUTO: 4.89 X10E6/UL (ref 4.14–5.8)
SODIUM SERPL-SCNC: 142 MMOL/L (ref 134–144)
T4 FREE SERPL-MCNC: 1.08 NG/DL (ref 0.82–1.77)
TSH SERPL DL<=0.005 MIU/L-ACNC: 2.79 UIU/ML (ref 0.45–4.5)
VIT B12 SERPL-MCNC: 358 PG/ML (ref 232–1245)
WBC # BLD AUTO: 5.7 X10E3/UL (ref 3.4–10.8)

## 2025-06-15 LAB — VIT B1 BLD-SCNC: 113.1 NMOL/L (ref 66.5–200)

## 2025-06-18 ENCOUNTER — HOSPITAL ENCOUNTER (OUTPATIENT)
Facility: HOSPITAL | Age: 55
Discharge: HOME | End: 2025-06-21

## 2025-06-18 VITALS — HEIGHT: 70 IN | WEIGHT: 299 LBS | BODY MASS INDEX: 42.8 KG/M2

## 2025-06-18 NOTE — PROGRESS NOTES
Medical Weight Loss Multi-Disciplinary Program    Patient's Name: Bud Gardner Age: 55 y.o.   YOB: 1970 Sex: male      Session #1. Pt attended in-person class.  Weight obtained in office.    Date: 6/18/2025    Vitals:    06/18/25 1615   Weight: 135.6 kg (299 lb)   Height: 1.778 m (5' 10\")      Body mass index is 42.9 kg/m².     Pounds Lost since last month: N/A   Pounds Gained since last month: N/A    Starting Weight: 305.8 lbs  Previous Month’s Weight: N/A  Overall Pounds Lost: 6.8 lbs  Overall Pounds Gained: 0 lbs      Class Guidelines    Guidelines are reviewed with patient at the start of every class.    1. Patient understands that weight loss trial classes must be consecutive.  Patient understands if they miss a class, it is their responsibility to contact me to reschedule class.  I will reach out to patient after their first no show.  2.  Patient understands the expectations that weight maintenance/weight loss is expected during the classes.  Failure to demonstrate changes may result in extension of weight loss trial, followed by returning to see the surgeon.  Patient understands that they CANNOT gain any weight during the weight loss trial.  Gaining weight will result in extension of weight loss trial.  3. Patient is also instructed to complete their labwork, psychological evaluation visit, and any other tests that the surgeon has used while they are working on their weight loss trial.  4.  Patient was instructed to bring their packet of nutrition education materials to every class and appointment.        Eating Habits and Behaviors    Today in class we reviewed the Key Diet Principles.  Patient was encouraged to consume 3 meals each day, and the timing the meals was reviewed.  Meal time behaviors that will help pt to be successful with their weight loss efforts were additionally reviewed.      We discussed the importance of drinking adequate amounts of fluids, recommending that patient

## 2025-06-19 ENCOUNTER — HOSPITAL ENCOUNTER (OUTPATIENT)
Facility: HOSPITAL | Age: 55
Discharge: HOME | End: 2025-06-22

## 2025-06-19 VITALS — HEIGHT: 70 IN | WEIGHT: 299 LBS | BODY MASS INDEX: 42.8 KG/M2

## 2025-06-19 NOTE — PROGRESS NOTES
that patient consume a minimum of 64 oz of sugar-free, caffeine-free and carbonation-free fluids each day.  Patient was encouraged to eliminate sugar-sweetened beverages such as sweet tea, fruit juice, fruit smoothies, flavored coffee drinks and regular sodas.      During the weight loss trial, patient was encouraged to focus on eating protein-forward meals, with a daily goal of  grams protein.  Patient was also advised to decrease carbohydrate intake to <100 g/d, choosing complex vs. simple carbohydrates in limited amounts.  We also discussed limiting fat intake.  Encouraged patient to follow the \"3-gram rule\" when choosing foods by looking for items containing <3 g of fat and sugar per serving.  We reviewed meal planning guidelines and discussed appropriate meal and snack options.  We also talked about appropriate protein drinks and patient was encouraged to start trying these, using them either for a meal replacement or a protein-rich snack.  We reviewed the nutritional guidelines for selecting protein shakes.      Pre-operative vitamin and mineral supplementation were reviewed.  Patient was instructed to choose chewable complete multivitamin with iron in NON-gummy form. Selection of probiotic was reviewed.  Pt was additionally encouraged to start taking a B50 or B100 Vitamin complex when starting multivitamin and probiotic at least 30 days pre-op.    Patient's current diet habits include:  Patient is eating 5 \"tiny\" meals and 1 snacks per day.  Pt has found 0 protein supplements/shakes for use post-op in meeting their protein needs.    Patient's plan for dietary and/or behavior changes in the upcoming month: \"To follow all this program rules perfectly for success.\"      Physical Activity/Exercise    Comments:    We talked about exercise.  Patient was given reasons of why exercise is so important and how that can help with their long-term success.  I have encouraged patient to get a support system to help

## 2025-08-06 ENCOUNTER — OFFICE VISIT (OUTPATIENT)
Age: 55
End: 2025-08-06
Payer: MEDICAID

## 2025-08-06 VITALS
HEART RATE: 73 BPM | OXYGEN SATURATION: 98 % | RESPIRATION RATE: 16 BRPM | WEIGHT: 293 LBS | DIASTOLIC BLOOD PRESSURE: 83 MMHG | BODY MASS INDEX: 41.95 KG/M2 | SYSTOLIC BLOOD PRESSURE: 126 MMHG | HEIGHT: 70 IN | TEMPERATURE: 96.9 F

## 2025-08-06 DIAGNOSIS — E66.01 MORBID OBESITY (HCC): Primary | ICD-10-CM

## 2025-08-06 DIAGNOSIS — G47.30 SLEEP APNEA, UNSPECIFIED TYPE: ICD-10-CM

## 2025-08-06 DIAGNOSIS — E11.9 TYPE 2 DIABETES MELLITUS WITHOUT COMPLICATION, WITH LONG-TERM CURRENT USE OF INSULIN (HCC): ICD-10-CM

## 2025-08-06 DIAGNOSIS — E66.01 MORBID OBESITY WITH BMI OF 40.0-44.9, ADULT (HCC): ICD-10-CM

## 2025-08-06 DIAGNOSIS — M19.90 ARTHRITIS: ICD-10-CM

## 2025-08-06 DIAGNOSIS — Z86.718 HX OF BLOOD CLOTS: ICD-10-CM

## 2025-08-06 DIAGNOSIS — Z92.89 HISTORY OF BLOOD TRANSFUSION: ICD-10-CM

## 2025-08-06 DIAGNOSIS — E88.810 METABOLIC SYNDROME X: ICD-10-CM

## 2025-08-06 DIAGNOSIS — Z79.4 TYPE 2 DIABETES MELLITUS WITHOUT COMPLICATION, WITH LONG-TERM CURRENT USE OF INSULIN (HCC): ICD-10-CM

## 2025-08-06 DIAGNOSIS — K21.9 GASTROESOPHAGEAL REFLUX DISEASE, UNSPECIFIED WHETHER ESOPHAGITIS PRESENT: ICD-10-CM

## 2025-08-06 DIAGNOSIS — I10 HYPERTENSION, UNSPECIFIED TYPE: ICD-10-CM

## 2025-08-06 DIAGNOSIS — E78.00 HYPERCHOLESTEROLEMIA: ICD-10-CM

## 2025-08-06 PROBLEM — E87.20 LACTIC ACIDOSIS: Status: RESOLVED | Noted: 2023-02-02 | Resolved: 2025-08-06

## 2025-08-06 PROBLEM — L02.415 ABSCESS OF RIGHT THIGH: Status: RESOLVED | Noted: 2023-02-03 | Resolved: 2025-08-06

## 2025-08-06 PROBLEM — N17.9 AKI (ACUTE KIDNEY INJURY): Status: RESOLVED | Noted: 2023-02-01 | Resolved: 2025-08-06

## 2025-08-06 PROBLEM — A41.9 SEPSIS (HCC): Status: RESOLVED | Noted: 2023-02-01 | Resolved: 2025-08-06

## 2025-08-06 PROBLEM — S71.101A OPEN WOUND OF RIGHT THIGH: Status: RESOLVED | Noted: 2023-02-16 | Resolved: 2025-08-06

## 2025-08-06 PROBLEM — G47.33 OSA (OBSTRUCTIVE SLEEP APNEA): Status: RESOLVED | Noted: 2023-02-01 | Resolved: 2025-08-06

## 2025-08-06 PROBLEM — K61.1 PERIRECTAL CELLULITIS: Status: RESOLVED | Noted: 2023-04-10 | Resolved: 2025-08-06

## 2025-08-06 PROBLEM — L03.90 CELLULITIS: Status: RESOLVED | Noted: 2023-02-01 | Resolved: 2025-08-06

## 2025-08-06 PROBLEM — I27.20 PULMONARY HYPERTENSION (HCC): Status: RESOLVED | Noted: 2023-03-09 | Resolved: 2025-08-06

## 2025-08-06 PROBLEM — K61.1 PERIRECTAL ABSCESS: Status: RESOLVED | Noted: 2023-04-11 | Resolved: 2025-08-06

## 2025-08-06 PROCEDURE — 3044F HG A1C LEVEL LT 7.0%: CPT | Performed by: SPECIALIST

## 2025-08-06 PROCEDURE — 99215 OFFICE O/P EST HI 40 MIN: CPT | Performed by: SPECIALIST

## 2025-08-06 PROCEDURE — 3074F SYST BP LT 130 MM HG: CPT | Performed by: SPECIALIST

## 2025-08-06 PROCEDURE — 3079F DIAST BP 80-89 MM HG: CPT | Performed by: SPECIALIST

## 2025-08-11 DIAGNOSIS — I10 HYPERTENSION, UNSPECIFIED TYPE: ICD-10-CM

## 2025-08-11 DIAGNOSIS — E66.01 MORBID OBESITY (HCC): ICD-10-CM

## 2025-08-11 DIAGNOSIS — G47.30 SLEEP APNEA, UNSPECIFIED TYPE: ICD-10-CM

## 2025-08-11 DIAGNOSIS — E66.01 MORBID OBESITY (HCC): Primary | ICD-10-CM

## 2025-08-11 DIAGNOSIS — Z01.812 PRE-OPERATIVE LABORATORY EXAMINATION: ICD-10-CM

## 2025-08-18 ENCOUNTER — OFFICE VISIT (OUTPATIENT)
Age: 55
End: 2025-08-18
Payer: MEDICAID

## 2025-08-18 ENCOUNTER — HOSPITAL ENCOUNTER (OUTPATIENT)
Facility: HOSPITAL | Age: 55
Discharge: HOME | End: 2025-08-21

## 2025-08-18 ENCOUNTER — HOSPITAL ENCOUNTER (OUTPATIENT)
Facility: HOSPITAL | Age: 55
Discharge: HOME | End: 2025-08-21
Payer: MEDICAID

## 2025-08-18 ENCOUNTER — CLINICAL DOCUMENTATION (OUTPATIENT)
Age: 55
End: 2025-08-18

## 2025-08-18 VITALS
TEMPERATURE: 97.2 F | BODY MASS INDEX: 41.99 KG/M2 | SYSTOLIC BLOOD PRESSURE: 104 MMHG | DIASTOLIC BLOOD PRESSURE: 62 MMHG | WEIGHT: 293.3 LBS | OXYGEN SATURATION: 100 % | HEART RATE: 85 BPM | HEIGHT: 70 IN

## 2025-08-18 DIAGNOSIS — K21.9 GASTROESOPHAGEAL REFLUX DISEASE, UNSPECIFIED WHETHER ESOPHAGITIS PRESENT: ICD-10-CM

## 2025-08-18 DIAGNOSIS — E78.00 HYPERCHOLESTEROLEMIA: ICD-10-CM

## 2025-08-18 DIAGNOSIS — E66.01 MORBID OBESITY WITH BMI OF 40.0-44.9, ADULT (HCC): ICD-10-CM

## 2025-08-18 DIAGNOSIS — Z86.718 HX OF BLOOD CLOTS: ICD-10-CM

## 2025-08-18 DIAGNOSIS — G47.30 SLEEP APNEA, UNSPECIFIED TYPE: ICD-10-CM

## 2025-08-18 DIAGNOSIS — I10 HYPERTENSION, UNSPECIFIED TYPE: ICD-10-CM

## 2025-08-18 DIAGNOSIS — E66.01 MORBID OBESITY (HCC): Primary | ICD-10-CM

## 2025-08-18 DIAGNOSIS — E88.810 METABOLIC SYNDROME X: ICD-10-CM

## 2025-08-18 DIAGNOSIS — Z92.89 HISTORY OF BLOOD TRANSFUSION: ICD-10-CM

## 2025-08-18 LAB
ABO + RH BLD: NORMAL
BLOOD GROUP ANTIBODIES SERPL: NORMAL
SPECIMEN EXP DATE BLD: NORMAL

## 2025-08-18 PROCEDURE — 86901 BLOOD TYPING SEROLOGIC RH(D): CPT

## 2025-08-18 PROCEDURE — 86850 RBC ANTIBODY SCREEN: CPT

## 2025-08-18 PROCEDURE — 36415 COLL VENOUS BLD VENIPUNCTURE: CPT

## 2025-08-18 PROCEDURE — 99215 OFFICE O/P EST HI 40 MIN: CPT | Performed by: SPECIALIST

## 2025-08-18 PROCEDURE — 3074F SYST BP LT 130 MM HG: CPT | Performed by: SPECIALIST

## 2025-08-18 PROCEDURE — 86900 BLOOD TYPING SEROLOGIC ABO: CPT

## 2025-08-18 PROCEDURE — 3078F DIAST BP <80 MM HG: CPT | Performed by: SPECIALIST

## 2025-08-18 RX ORDER — ENOXAPARIN SODIUM 100 MG/ML
INJECTION SUBCUTANEOUS
Qty: 28 EACH | Refills: 0 | Status: SHIPPED | OUTPATIENT
Start: 2025-08-18

## 2025-08-18 RX ORDER — ONDANSETRON 8 MG/1
8 TABLET, ORALLY DISINTEGRATING ORAL EVERY 8 HOURS PRN
Qty: 30 TABLET | Refills: 0 | Status: SHIPPED | OUTPATIENT
Start: 2025-08-18

## 2025-08-25 ENCOUNTER — ANESTHESIA EVENT (OUTPATIENT)
Facility: HOSPITAL | Age: 55
DRG: 403 | End: 2025-08-25
Payer: MEDICAID

## 2025-08-26 ENCOUNTER — TELEPHONE (OUTPATIENT)
Age: 55
End: 2025-08-26

## 2025-08-26 ENCOUNTER — HOSPITAL ENCOUNTER (INPATIENT)
Facility: HOSPITAL | Age: 55
LOS: 1 days | Discharge: HOME | DRG: 403 | End: 2025-08-27
Attending: SPECIALIST | Admitting: SPECIALIST
Payer: MEDICAID

## 2025-08-26 ENCOUNTER — ANESTHESIA (OUTPATIENT)
Facility: HOSPITAL | Age: 55
DRG: 403 | End: 2025-08-26
Payer: MEDICAID

## 2025-08-26 PROBLEM — K76.0 NAFLD (NONALCOHOLIC FATTY LIVER DISEASE): Status: ACTIVE | Noted: 2025-08-26

## 2025-08-26 PROBLEM — K29.30 SUPERFICIAL GASTRITIS WITHOUT HEMORRHAGE: Status: ACTIVE | Noted: 2025-08-26

## 2025-08-26 PROBLEM — E66.01 MORBID OBESITY WITH BODY MASS INDEX (BMI) OF 40.0 TO 49.9 (HCC): Status: ACTIVE | Noted: 2025-08-26

## 2025-08-26 LAB
GLUCOSE BLD STRIP.AUTO-MCNC: 102 MG/DL (ref 70–110)
GLUCOSE BLD STRIP.AUTO-MCNC: 142 MG/DL (ref 70–110)

## 2025-08-26 PROCEDURE — 2500000003 HC RX 250 WO HCPCS: Performed by: STUDENT IN AN ORGANIZED HEALTH CARE EDUCATION/TRAINING PROGRAM

## 2025-08-26 PROCEDURE — 82962 GLUCOSE BLOOD TEST: CPT

## 2025-08-26 PROCEDURE — 47379 UNLISTED LAPS PX LIVER: CPT | Performed by: SPECIALIST

## 2025-08-26 PROCEDURE — 1100000000 HC RM PRIVATE

## 2025-08-26 PROCEDURE — C1713 ANCHOR/SCREW BN/BN,TIS/BN: HCPCS | Performed by: SPECIALIST

## 2025-08-26 PROCEDURE — 47100 WEDGE BIOPSY OF LIVER: CPT | Performed by: SPECIALIST

## 2025-08-26 PROCEDURE — 0DB64Z3 EXCISION OF STOMACH, PERCUTANEOUS ENDOSCOPIC APPROACH, VERTICAL: ICD-10-PCS | Performed by: SPECIALIST

## 2025-08-26 PROCEDURE — 6360000002 HC RX W HCPCS: Performed by: SPECIALIST

## 2025-08-26 PROCEDURE — 0FB24ZX EXCISION OF LEFT LOBE LIVER, PERCUTANEOUS ENDOSCOPIC APPROACH, DIAGNOSTIC: ICD-10-PCS | Performed by: SPECIALIST

## 2025-08-26 PROCEDURE — 2500000003 HC RX 250 WO HCPCS: Performed by: SPECIALIST

## 2025-08-26 PROCEDURE — 88305 TISSUE EXAM BY PATHOLOGIST: CPT

## 2025-08-26 PROCEDURE — 6360000002 HC RX W HCPCS

## 2025-08-26 PROCEDURE — 3700000001 HC ADD 15 MINUTES (ANESTHESIA): Performed by: SPECIALIST

## 2025-08-26 PROCEDURE — 6360000002 HC RX W HCPCS: Performed by: STUDENT IN AN ORGANIZED HEALTH CARE EDUCATION/TRAINING PROGRAM

## 2025-08-26 PROCEDURE — 2580000003 HC RX 258: Performed by: SPECIALIST

## 2025-08-26 PROCEDURE — 2709999900 HC NON-CHARGEABLE SUPPLY: Performed by: SPECIALIST

## 2025-08-26 PROCEDURE — 7100000000 HC PACU RECOVERY - FIRST 15 MIN: Performed by: SPECIALIST

## 2025-08-26 PROCEDURE — 43239 EGD BIOPSY SINGLE/MULTIPLE: CPT | Performed by: SPECIALIST

## 2025-08-26 PROCEDURE — 0DB78ZX EXCISION OF STOMACH, PYLORUS, VIA NATURAL OR ARTIFICIAL OPENING ENDOSCOPIC, DIAGNOSTIC: ICD-10-PCS | Performed by: SPECIALIST

## 2025-08-26 PROCEDURE — 3700000000 HC ANESTHESIA ATTENDED CARE: Performed by: SPECIALIST

## 2025-08-26 PROCEDURE — 7100000001 HC PACU RECOVERY - ADDTL 15 MIN: Performed by: SPECIALIST

## 2025-08-26 PROCEDURE — 3600000005 HC SURGERY LEVEL 5 BASE: Performed by: SPECIALIST

## 2025-08-26 PROCEDURE — 3600000015 HC SURGERY LEVEL 5 ADDTL 15MIN: Performed by: SPECIALIST

## 2025-08-26 PROCEDURE — 6370000000 HC RX 637 (ALT 250 FOR IP): Performed by: SPECIALIST

## 2025-08-26 PROCEDURE — 88307 TISSUE EXAM BY PATHOLOGIST: CPT

## 2025-08-26 PROCEDURE — 2500000003 HC RX 250 WO HCPCS

## 2025-08-26 PROCEDURE — 43775 LAP SLEEVE GASTRECTOMY: CPT | Performed by: SPECIALIST

## 2025-08-26 PROCEDURE — 88313 SPECIAL STAINS GROUP 2: CPT

## 2025-08-26 PROCEDURE — 2720000010 HC SURG SUPPLY STERILE: Performed by: SPECIALIST

## 2025-08-26 RX ORDER — KETOROLAC TROMETHAMINE 30 MG/ML
30 INJECTION, SOLUTION INTRAMUSCULAR; INTRAVENOUS EVERY 6 HOURS
Status: DISCONTINUED | OUTPATIENT
Start: 2025-08-26 | End: 2025-08-27 | Stop reason: HOSPADM

## 2025-08-26 RX ORDER — MIDAZOLAM HYDROCHLORIDE 1 MG/ML
INJECTION, SOLUTION INTRAMUSCULAR; INTRAVENOUS
Status: DISCONTINUED | OUTPATIENT
Start: 2025-08-26 | End: 2025-08-26 | Stop reason: SDUPTHER

## 2025-08-26 RX ORDER — OXYCODONE HYDROCHLORIDE 5 MG/1
10 TABLET ORAL PRN
Status: DISCONTINUED | OUTPATIENT
Start: 2025-08-26 | End: 2025-08-26 | Stop reason: HOSPADM

## 2025-08-26 RX ORDER — BUPIVACAINE HYDROCHLORIDE AND EPINEPHRINE 2.5; 5 MG/ML; UG/ML
INJECTION, SOLUTION EPIDURAL; INFILTRATION; INTRACAUDAL; PERINEURAL PRN
Status: DISCONTINUED | OUTPATIENT
Start: 2025-08-26 | End: 2025-08-26 | Stop reason: ALTCHOICE

## 2025-08-26 RX ORDER — FENTANYL CITRATE 50 UG/ML
INJECTION, SOLUTION INTRAMUSCULAR; INTRAVENOUS
Status: DISCONTINUED | OUTPATIENT
Start: 2025-08-26 | End: 2025-08-26 | Stop reason: SDUPTHER

## 2025-08-26 RX ORDER — SODIUM CHLORIDE 0.9 % (FLUSH) 0.9 %
5-40 SYRINGE (ML) INJECTION PRN
Status: DISCONTINUED | OUTPATIENT
Start: 2025-08-26 | End: 2025-08-26 | Stop reason: HOSPADM

## 2025-08-26 RX ORDER — ONDANSETRON 2 MG/ML
4 INJECTION INTRAMUSCULAR; INTRAVENOUS EVERY 6 HOURS PRN
Status: DISCONTINUED | OUTPATIENT
Start: 2025-08-26 | End: 2025-08-27 | Stop reason: HOSPADM

## 2025-08-26 RX ORDER — KETOROLAC TROMETHAMINE 30 MG/ML
INJECTION, SOLUTION INTRAMUSCULAR; INTRAVENOUS
Status: DISCONTINUED | OUTPATIENT
Start: 2025-08-26 | End: 2025-08-26 | Stop reason: SDUPTHER

## 2025-08-26 RX ORDER — LABETALOL HYDROCHLORIDE 5 MG/ML
10 INJECTION, SOLUTION INTRAVENOUS
Status: DISCONTINUED | OUTPATIENT
Start: 2025-08-26 | End: 2025-08-26 | Stop reason: HOSPADM

## 2025-08-26 RX ORDER — ONDANSETRON 2 MG/ML
4 INJECTION INTRAMUSCULAR; INTRAVENOUS
Status: DISCONTINUED | OUTPATIENT
Start: 2025-08-26 | End: 2025-08-26 | Stop reason: HOSPADM

## 2025-08-26 RX ORDER — SODIUM CHLORIDE, SODIUM LACTATE, POTASSIUM CHLORIDE, CALCIUM CHLORIDE 600; 310; 30; 20 MG/100ML; MG/100ML; MG/100ML; MG/100ML
INJECTION, SOLUTION INTRAVENOUS CONTINUOUS
Status: DISCONTINUED | OUTPATIENT
Start: 2025-08-26 | End: 2025-08-26 | Stop reason: HOSPADM

## 2025-08-26 RX ORDER — ACETAMINOPHEN 325 MG/1
650 TABLET ORAL ONCE
Status: DISCONTINUED | OUTPATIENT
Start: 2025-08-26 | End: 2025-08-26

## 2025-08-26 RX ORDER — EPHEDRINE SULFATE 5 MG/ML
INJECTION INTRAVENOUS
Status: DISCONTINUED | OUTPATIENT
Start: 2025-08-26 | End: 2025-08-26 | Stop reason: SDUPTHER

## 2025-08-26 RX ORDER — FENTANYL CITRATE 50 UG/ML
50 INJECTION, SOLUTION INTRAMUSCULAR; INTRAVENOUS EVERY 5 MIN PRN
Status: DISCONTINUED | OUTPATIENT
Start: 2025-08-26 | End: 2025-08-26 | Stop reason: HOSPADM

## 2025-08-26 RX ORDER — OMEPRAZOLE 20 MG/1
20 TABLET, DELAYED RELEASE ORAL DAILY
Qty: 30 TABLET | Refills: 1 | Status: SHIPPED | OUTPATIENT
Start: 2025-08-26

## 2025-08-26 RX ORDER — SCOPOLAMINE 1 MG/3D
1 PATCH, EXTENDED RELEASE TRANSDERMAL
Status: DISCONTINUED | OUTPATIENT
Start: 2025-08-26 | End: 2025-08-27 | Stop reason: HOSPADM

## 2025-08-26 RX ORDER — ROCURONIUM BROMIDE 10 MG/ML
INJECTION, SOLUTION INTRAVENOUS
Status: DISCONTINUED | OUTPATIENT
Start: 2025-08-26 | End: 2025-08-26 | Stop reason: SDUPTHER

## 2025-08-26 RX ORDER — DIPHENHYDRAMINE HYDROCHLORIDE 50 MG/ML
12.5 INJECTION, SOLUTION INTRAMUSCULAR; INTRAVENOUS
Status: DISCONTINUED | OUTPATIENT
Start: 2025-08-26 | End: 2025-08-26 | Stop reason: HOSPADM

## 2025-08-26 RX ORDER — ONDANSETRON 4 MG/1
4 TABLET, ORALLY DISINTEGRATING ORAL EVERY 8 HOURS PRN
Status: DISCONTINUED | OUTPATIENT
Start: 2025-08-26 | End: 2025-08-27 | Stop reason: HOSPADM

## 2025-08-26 RX ORDER — SODIUM CHLORIDE 0.9 % (FLUSH) 0.9 %
5-40 SYRINGE (ML) INJECTION EVERY 12 HOURS SCHEDULED
Status: DISCONTINUED | OUTPATIENT
Start: 2025-08-26 | End: 2025-08-27 | Stop reason: HOSPADM

## 2025-08-26 RX ORDER — ENOXAPARIN SODIUM 100 MG/ML
40 INJECTION SUBCUTANEOUS EVERY 12 HOURS
Status: DISCONTINUED | OUTPATIENT
Start: 2025-08-26 | End: 2025-08-27 | Stop reason: HOSPADM

## 2025-08-26 RX ORDER — SODIUM CHLORIDE 0.9 % (FLUSH) 0.9 %
5-40 SYRINGE (ML) INJECTION EVERY 12 HOURS SCHEDULED
Status: DISCONTINUED | OUTPATIENT
Start: 2025-08-26 | End: 2025-08-26 | Stop reason: HOSPADM

## 2025-08-26 RX ORDER — LABETALOL HYDROCHLORIDE 5 MG/ML
INJECTION, SOLUTION INTRAVENOUS
Status: DISPENSED
Start: 2025-08-26 | End: 2025-08-27

## 2025-08-26 RX ORDER — SODIUM CHLORIDE 9 MG/ML
INJECTION, SOLUTION INTRAVENOUS PRN
Status: DISCONTINUED | OUTPATIENT
Start: 2025-08-26 | End: 2025-08-26 | Stop reason: HOSPADM

## 2025-08-26 RX ORDER — METOCLOPRAMIDE HYDROCHLORIDE 5 MG/ML
10 INJECTION INTRAMUSCULAR; INTRAVENOUS EVERY 6 HOURS
Status: DISCONTINUED | OUTPATIENT
Start: 2025-08-26 | End: 2025-08-27 | Stop reason: HOSPADM

## 2025-08-26 RX ORDER — DEXMEDETOMIDINE HYDROCHLORIDE 100 UG/ML
INJECTION, SOLUTION INTRAVENOUS
Status: DISCONTINUED | OUTPATIENT
Start: 2025-08-26 | End: 2025-08-26 | Stop reason: SDUPTHER

## 2025-08-26 RX ORDER — MORPHINE SULFATE 10 MG/ML
6 INJECTION, SOLUTION INTRAMUSCULAR; INTRAVENOUS
Status: DISCONTINUED | OUTPATIENT
Start: 2025-08-26 | End: 2025-08-27

## 2025-08-26 RX ORDER — SODIUM CHLORIDE, SODIUM LACTATE, POTASSIUM CHLORIDE, CALCIUM CHLORIDE 600; 310; 30; 20 MG/100ML; MG/100ML; MG/100ML; MG/100ML
INJECTION, SOLUTION INTRAVENOUS CONTINUOUS
Status: DISCONTINUED | OUTPATIENT
Start: 2025-08-26 | End: 2025-08-27 | Stop reason: HOSPADM

## 2025-08-26 RX ORDER — ENOXAPARIN SODIUM 100 MG/ML
40 INJECTION SUBCUTANEOUS ONCE
Status: COMPLETED | OUTPATIENT
Start: 2025-08-26 | End: 2025-08-26

## 2025-08-26 RX ORDER — MIDAZOLAM HYDROCHLORIDE 2 MG/2ML
2 INJECTION, SOLUTION INTRAMUSCULAR; INTRAVENOUS
Status: DISCONTINUED | OUTPATIENT
Start: 2025-08-26 | End: 2025-08-26 | Stop reason: HOSPADM

## 2025-08-26 RX ORDER — PROPOFOL 10 MG/ML
INJECTION, EMULSION INTRAVENOUS
Status: DISCONTINUED | OUTPATIENT
Start: 2025-08-26 | End: 2025-08-26 | Stop reason: SDUPTHER

## 2025-08-26 RX ORDER — NYSTATIN 100000 [USP'U]/ML
5 SUSPENSION ORAL ONCE
Status: COMPLETED | OUTPATIENT
Start: 2025-08-26 | End: 2025-08-26

## 2025-08-26 RX ORDER — DROPERIDOL 2.5 MG/ML
0.62 INJECTION, SOLUTION INTRAMUSCULAR; INTRAVENOUS
Status: DISCONTINUED | OUTPATIENT
Start: 2025-08-26 | End: 2025-08-26 | Stop reason: HOSPADM

## 2025-08-26 RX ORDER — SODIUM CHLORIDE 0.9 % (FLUSH) 0.9 %
5-40 SYRINGE (ML) INJECTION PRN
Status: DISCONTINUED | OUTPATIENT
Start: 2025-08-26 | End: 2025-08-27 | Stop reason: HOSPADM

## 2025-08-26 RX ORDER — HYDROMORPHONE HYDROCHLORIDE 1 MG/ML
0.5 INJECTION, SOLUTION INTRAMUSCULAR; INTRAVENOUS; SUBCUTANEOUS EVERY 5 MIN PRN
Status: DISCONTINUED | OUTPATIENT
Start: 2025-08-26 | End: 2025-08-26 | Stop reason: HOSPADM

## 2025-08-26 RX ORDER — LIDOCAINE HYDROCHLORIDE 20 MG/ML
INJECTION, SOLUTION INTRAVENOUS
Status: DISCONTINUED | OUTPATIENT
Start: 2025-08-26 | End: 2025-08-26 | Stop reason: SDUPTHER

## 2025-08-26 RX ORDER — OXYCODONE HYDROCHLORIDE 5 MG/1
5 TABLET ORAL PRN
Status: DISCONTINUED | OUTPATIENT
Start: 2025-08-26 | End: 2025-08-26 | Stop reason: HOSPADM

## 2025-08-26 RX ORDER — SODIUM CHLORIDE 9 MG/ML
INJECTION, SOLUTION INTRAVENOUS PRN
Status: DISCONTINUED | OUTPATIENT
Start: 2025-08-26 | End: 2025-08-27 | Stop reason: HOSPADM

## 2025-08-26 RX ORDER — APREPITANT 40 MG/1
40 CAPSULE ORAL ONCE
Status: COMPLETED | OUTPATIENT
Start: 2025-08-26 | End: 2025-08-26

## 2025-08-26 RX ORDER — SIMETHICONE 80 MG
80 TABLET,CHEWABLE ORAL EVERY 6 HOURS PRN
Status: DISCONTINUED | OUTPATIENT
Start: 2025-08-26 | End: 2025-08-27 | Stop reason: HOSPADM

## 2025-08-26 RX ORDER — MAGNESIUM HYDROXIDE 1200 MG/15ML
LIQUID ORAL CONTINUOUS PRN
Status: COMPLETED | OUTPATIENT
Start: 2025-08-26 | End: 2025-08-26

## 2025-08-26 RX ORDER — METOCLOPRAMIDE HYDROCHLORIDE 5 MG/ML
INJECTION INTRAMUSCULAR; INTRAVENOUS
Status: DISCONTINUED | OUTPATIENT
Start: 2025-08-26 | End: 2025-08-26 | Stop reason: SDUPTHER

## 2025-08-26 RX ORDER — ONDANSETRON 2 MG/ML
INJECTION INTRAMUSCULAR; INTRAVENOUS
Status: DISCONTINUED | OUTPATIENT
Start: 2025-08-26 | End: 2025-08-26 | Stop reason: SDUPTHER

## 2025-08-26 RX ADMIN — ROCURONIUM BROMIDE 50 MG: 10 INJECTION, SOLUTION INTRAVENOUS at 09:48

## 2025-08-26 RX ADMIN — SODIUM CHLORIDE, PRESERVATIVE FREE 20 MG: 5 INJECTION INTRAVENOUS at 08:17

## 2025-08-26 RX ADMIN — HYDROMORPHONE HYDROCHLORIDE 0.5 MG: 1 INJECTION, SOLUTION INTRAMUSCULAR; INTRAVENOUS; SUBCUTANEOUS at 12:13

## 2025-08-26 RX ADMIN — MORPHINE SULFATE 6 MG: 10 INJECTION INTRAVENOUS at 13:26

## 2025-08-26 RX ADMIN — SODIUM CHLORIDE, PRESERVATIVE FREE 10 ML: 5 INJECTION INTRAVENOUS at 21:08

## 2025-08-26 RX ADMIN — LIDOCAINE HYDROCHLORIDE 100 MG: 20 INJECTION, SOLUTION INTRAVENOUS at 09:48

## 2025-08-26 RX ADMIN — SODIUM CHLORIDE, SODIUM LACTATE, POTASSIUM CHLORIDE, AND CALCIUM CHLORIDE: 600; 310; 30; 20 INJECTION, SOLUTION INTRAVENOUS at 10:19

## 2025-08-26 RX ADMIN — Medication 3000 MG: at 10:02

## 2025-08-26 RX ADMIN — SIMETHICONE 80 MG: 80 TABLET, CHEWABLE ORAL at 15:28

## 2025-08-26 RX ADMIN — SODIUM CHLORIDE, SODIUM LACTATE, POTASSIUM CHLORIDE, AND CALCIUM CHLORIDE: .6; .31; .03; .02 INJECTION, SOLUTION INTRAVENOUS at 13:24

## 2025-08-26 RX ADMIN — KETOROLAC TROMETHAMINE 15 MG: 30 INJECTION, SOLUTION INTRAMUSCULAR at 10:57

## 2025-08-26 RX ADMIN — MIDAZOLAM 2 MG: 1 INJECTION INTRAMUSCULAR; INTRAVENOUS at 09:40

## 2025-08-26 RX ADMIN — ONDANSETRON 4 MG: 2 INJECTION, SOLUTION INTRAMUSCULAR; INTRAVENOUS at 21:07

## 2025-08-26 RX ADMIN — ENOXAPARIN SODIUM 40 MG: 100 INJECTION SUBCUTANEOUS at 08:07

## 2025-08-26 RX ADMIN — SODIUM CHLORIDE, SODIUM LACTATE, POTASSIUM CHLORIDE, AND CALCIUM CHLORIDE: 600; 310; 30; 20 INJECTION, SOLUTION INTRAVENOUS at 08:17

## 2025-08-26 RX ADMIN — DEXMEDETOMIDINE HYDROCHLORIDE 2 MCG: 100 INJECTION, SOLUTION INTRAVENOUS at 11:06

## 2025-08-26 RX ADMIN — Medication 10 MG: at 10:03

## 2025-08-26 RX ADMIN — SODIUM CHLORIDE, SODIUM LACTATE, POTASSIUM CHLORIDE, AND CALCIUM CHLORIDE: 600; 310; 30; 20 INJECTION, SOLUTION INTRAVENOUS at 11:03

## 2025-08-26 RX ADMIN — PANTOPRAZOLE SODIUM 40 MG: 40 INJECTION, POWDER, FOR SOLUTION INTRAVENOUS at 21:07

## 2025-08-26 RX ADMIN — PROPOFOL 30 MG: 10 INJECTION, EMULSION INTRAVENOUS at 10:30

## 2025-08-26 RX ADMIN — FENTANYL CITRATE 50 MCG: 50 INJECTION INTRAMUSCULAR; INTRAVENOUS at 11:59

## 2025-08-26 RX ADMIN — SODIUM CHLORIDE, SODIUM LACTATE, POTASSIUM CHLORIDE, AND CALCIUM CHLORIDE: .6; .31; .03; .02 INJECTION, SOLUTION INTRAVENOUS at 23:14

## 2025-08-26 RX ADMIN — KETOROLAC TROMETHAMINE 30 MG: 30 INJECTION, SOLUTION INTRAMUSCULAR at 23:15

## 2025-08-26 RX ADMIN — MORPHINE SULFATE 6 MG: 10 INJECTION INTRAVENOUS at 16:58

## 2025-08-26 RX ADMIN — HYDROMORPHONE HYDROCHLORIDE 0.5 MG: 1 INJECTION, SOLUTION INTRAMUSCULAR; INTRAVENOUS; SUBCUTANEOUS at 12:22

## 2025-08-26 RX ADMIN — Medication 20 MG: at 10:29

## 2025-08-26 RX ADMIN — ROCURONIUM BROMIDE 10 MG: 10 INJECTION, SOLUTION INTRAVENOUS at 10:08

## 2025-08-26 RX ADMIN — APREPITANT 40 MG: 40 CAPSULE ORAL at 08:07

## 2025-08-26 RX ADMIN — EPHEDRINE SULFATE 10 MG: 5 INJECTION INTRAVENOUS at 10:16

## 2025-08-26 RX ADMIN — KETOROLAC TROMETHAMINE 30 MG: 30 INJECTION, SOLUTION INTRAMUSCULAR at 16:59

## 2025-08-26 RX ADMIN — FENTANYL CITRATE 50 MCG: 50 INJECTION INTRAMUSCULAR; INTRAVENOUS at 09:45

## 2025-08-26 RX ADMIN — FENTANYL CITRATE 50 MCG: 50 INJECTION INTRAMUSCULAR; INTRAVENOUS at 11:50

## 2025-08-26 RX ADMIN — ONDANSETRON 4 MG: 2 INJECTION, SOLUTION INTRAMUSCULAR; INTRAVENOUS at 13:26

## 2025-08-26 RX ADMIN — METOCLOPRAMIDE 10 MG: 5 INJECTION, SOLUTION INTRAMUSCULAR; INTRAVENOUS at 23:14

## 2025-08-26 RX ADMIN — ENOXAPARIN SODIUM 40 MG: 100 INJECTION SUBCUTANEOUS at 21:05

## 2025-08-26 RX ADMIN — SUGAMMADEX 260 MG: 100 INJECTION, SOLUTION INTRAVENOUS at 11:00

## 2025-08-26 RX ADMIN — EPHEDRINE SULFATE 10 MG: 5 INJECTION INTRAVENOUS at 10:19

## 2025-08-26 RX ADMIN — ONDANSETRON 4 MG: 2 INJECTION, SOLUTION INTRAMUSCULAR; INTRAVENOUS at 09:59

## 2025-08-26 RX ADMIN — METOCLOPRAMIDE 10 MG: 5 INJECTION, SOLUTION INTRAMUSCULAR; INTRAVENOUS at 10:57

## 2025-08-26 RX ADMIN — HYDROMORPHONE HYDROCHLORIDE 0.5 MG: 1 INJECTION, SOLUTION INTRAMUSCULAR; INTRAVENOUS; SUBCUTANEOUS at 10:59

## 2025-08-26 RX ADMIN — PROPOFOL 150 MG: 10 INJECTION, EMULSION INTRAVENOUS at 09:48

## 2025-08-26 RX ADMIN — METOCLOPRAMIDE 10 MG: 5 INJECTION, SOLUTION INTRAMUSCULAR; INTRAVENOUS at 16:58

## 2025-08-26 RX ADMIN — Medication 10 MG: at 10:59

## 2025-08-26 RX ADMIN — HYDROMORPHONE HYDROCHLORIDE 0.5 MG: 1 INJECTION, SOLUTION INTRAMUSCULAR; INTRAVENOUS; SUBCUTANEOUS at 11:19

## 2025-08-26 RX ADMIN — MORPHINE SULFATE 6 MG: 10 INJECTION INTRAVENOUS at 21:05

## 2025-08-26 RX ADMIN — NYSTATIN 500000 UNITS: 100000 SUSPENSION ORAL at 08:07

## 2025-08-26 ASSESSMENT — PAIN - FUNCTIONAL ASSESSMENT
PAIN_FUNCTIONAL_ASSESSMENT: PREVENTS OR INTERFERES SOME ACTIVE ACTIVITIES AND ADLS
PAIN_FUNCTIONAL_ASSESSMENT: 0-10
PAIN_FUNCTIONAL_ASSESSMENT: ACTIVITIES ARE NOT PREVENTED
PAIN_FUNCTIONAL_ASSESSMENT: PREVENTS OR INTERFERES SOME ACTIVE ACTIVITIES AND ADLS
PAIN_FUNCTIONAL_ASSESSMENT: PREVENTS OR INTERFERES SOME ACTIVE ACTIVITIES AND ADLS
PAIN_FUNCTIONAL_ASSESSMENT: 0-10

## 2025-08-26 ASSESSMENT — PAIN SCALES - GENERAL
PAINLEVEL_OUTOF10: 6
PAINLEVEL_OUTOF10: 7
PAINLEVEL_OUTOF10: 0
PAINLEVEL_OUTOF10: 8
PAINLEVEL_OUTOF10: 0
PAINLEVEL_OUTOF10: 8
PAINLEVEL_OUTOF10: 8
PAINLEVEL_OUTOF10: 9
PAINLEVEL_OUTOF10: 7

## 2025-08-26 ASSESSMENT — PAIN DESCRIPTION - PAIN TYPE
TYPE: SURGICAL PAIN

## 2025-08-26 ASSESSMENT — PAIN DESCRIPTION - ORIENTATION
ORIENTATION: MID
ORIENTATION: ANTERIOR
ORIENTATION: ANTERIOR

## 2025-08-26 ASSESSMENT — PAIN DESCRIPTION - FREQUENCY
FREQUENCY: INTERMITTENT

## 2025-08-26 ASSESSMENT — PAIN DESCRIPTION - LOCATION
LOCATION: BACK;LEG;SHOULDER
LOCATION: ABDOMEN

## 2025-08-26 ASSESSMENT — PAIN DESCRIPTION - DESCRIPTORS
DESCRIPTORS: PRESSURE;DISCOMFORT;DULL
DESCRIPTORS: ACHING
DESCRIPTORS: PRESSURE;DISCOMFORT
DESCRIPTORS: PRESSURE;DISCOMFORT
DESCRIPTORS: ACHING

## 2025-08-27 ENCOUNTER — APPOINTMENT (OUTPATIENT)
Facility: HOSPITAL | Age: 55
DRG: 403 | End: 2025-08-27
Attending: SPECIALIST
Payer: MEDICAID

## 2025-08-27 VITALS
OXYGEN SATURATION: 98 % | TEMPERATURE: 98.2 F | HEIGHT: 70 IN | BODY MASS INDEX: 40.74 KG/M2 | DIASTOLIC BLOOD PRESSURE: 85 MMHG | SYSTOLIC BLOOD PRESSURE: 120 MMHG | WEIGHT: 284.6 LBS | HEART RATE: 64 BPM | RESPIRATION RATE: 18 BRPM

## 2025-08-27 PROBLEM — R13.10 DYSPHAGIA: Status: ACTIVE | Noted: 2025-08-27

## 2025-08-27 LAB
GLUCOSE BLD STRIP.AUTO-MCNC: 110 MG/DL (ref 70–110)
GLUCOSE BLD STRIP.AUTO-MCNC: 112 MG/DL (ref 70–110)

## 2025-08-27 PROCEDURE — 6360000002 HC RX W HCPCS: Performed by: SPECIALIST

## 2025-08-27 PROCEDURE — 0DJ08ZZ INSPECTION OF UPPER INTESTINAL TRACT, VIA NATURAL OR ARTIFICIAL OPENING ENDOSCOPIC: ICD-10-PCS | Performed by: SPECIALIST

## 2025-08-27 PROCEDURE — 82962 GLUCOSE BLOOD TEST: CPT

## 2025-08-27 PROCEDURE — 6360000004 HC RX CONTRAST MEDICATION: Performed by: SPECIALIST

## 2025-08-27 PROCEDURE — 74240 X-RAY XM UPR GI TRC 1CNTRST: CPT

## 2025-08-27 PROCEDURE — 74240 X-RAY XM UPR GI TRC 1CNTRST: CPT | Performed by: SPECIALIST

## 2025-08-27 PROCEDURE — 2500000003 HC RX 250 WO HCPCS: Performed by: SPECIALIST

## 2025-08-27 RX ORDER — OXYCODONE AND ACETAMINOPHEN 5; 325 MG/1; MG/1
1 TABLET ORAL EVERY 4 HOURS PRN
Refills: 0 | Status: DISCONTINUED | OUTPATIENT
Start: 2025-08-27 | End: 2025-08-27 | Stop reason: HOSPADM

## 2025-08-27 RX ADMIN — METOCLOPRAMIDE 10 MG: 5 INJECTION, SOLUTION INTRAMUSCULAR; INTRAVENOUS at 11:33

## 2025-08-27 RX ADMIN — MORPHINE SULFATE 6 MG: 10 INJECTION INTRAVENOUS at 06:59

## 2025-08-27 RX ADMIN — KETOROLAC TROMETHAMINE 30 MG: 30 INJECTION, SOLUTION INTRAMUSCULAR at 11:33

## 2025-08-27 RX ADMIN — SODIUM CHLORIDE, PRESERVATIVE FREE 10 ML: 5 INJECTION INTRAVENOUS at 08:16

## 2025-08-27 RX ADMIN — IOHEXOL 20 ML: 240 INJECTION, SOLUTION INTRATHECAL; INTRAVASCULAR; INTRAVENOUS; ORAL at 07:35

## 2025-08-27 RX ADMIN — KETOROLAC TROMETHAMINE 30 MG: 30 INJECTION, SOLUTION INTRAMUSCULAR at 06:35

## 2025-08-27 RX ADMIN — MORPHINE SULFATE 6 MG: 10 INJECTION INTRAVENOUS at 00:06

## 2025-08-27 RX ADMIN — METOCLOPRAMIDE 10 MG: 5 INJECTION, SOLUTION INTRAMUSCULAR; INTRAVENOUS at 06:35

## 2025-08-27 RX ADMIN — BARIUM SULFATE 20 ML: 960 POWDER, FOR SUSPENSION ORAL at 07:35

## 2025-08-27 RX ADMIN — ENOXAPARIN SODIUM 40 MG: 100 INJECTION SUBCUTANEOUS at 08:16

## 2025-08-27 ASSESSMENT — PAIN DESCRIPTION - DESCRIPTORS
DESCRIPTORS: ACHING
DESCRIPTORS: ACHING

## 2025-08-27 ASSESSMENT — PAIN DESCRIPTION - PAIN TYPE: TYPE: SURGICAL PAIN

## 2025-08-27 ASSESSMENT — PAIN DESCRIPTION - LOCATION
LOCATION: ABDOMEN

## 2025-08-27 ASSESSMENT — PAIN SCALES - GENERAL
PAINLEVEL_OUTOF10: 8
PAINLEVEL_OUTOF10: 9
PAINLEVEL_OUTOF10: 7
PAINLEVEL_OUTOF10: 5

## 2025-08-27 ASSESSMENT — PAIN - FUNCTIONAL ASSESSMENT
PAIN_FUNCTIONAL_ASSESSMENT: ACTIVITIES ARE NOT PREVENTED
PAIN_FUNCTIONAL_ASSESSMENT: 0-10

## 2025-08-27 ASSESSMENT — PAIN DESCRIPTION - ORIENTATION
ORIENTATION: MID;UPPER
ORIENTATION: ANTERIOR
ORIENTATION: ANTERIOR

## 2025-08-27 ASSESSMENT — PAIN DESCRIPTION - ONSET: ONSET: ON-GOING

## 2025-08-27 ASSESSMENT — PAIN DESCRIPTION - FREQUENCY: FREQUENCY: CONTINUOUS

## 2025-08-28 ENCOUNTER — TELEPHONE (OUTPATIENT)
Age: 55
End: 2025-08-28

## 2025-09-02 ENCOUNTER — TELEPHONE (OUTPATIENT)
Age: 55
End: 2025-09-02

## 2025-09-05 ENCOUNTER — HOSPITAL ENCOUNTER (EMERGENCY)
Facility: HOSPITAL | Age: 55
Discharge: HOME | End: 2025-09-05
Payer: MEDICAID

## 2025-09-05 ENCOUNTER — APPOINTMENT (OUTPATIENT)
Facility: HOSPITAL | Age: 55
End: 2025-09-05
Payer: MEDICAID

## 2025-09-05 VITALS
HEIGHT: 70 IN | DIASTOLIC BLOOD PRESSURE: 99 MMHG | WEIGHT: 280 LBS | HEART RATE: 88 BPM | RESPIRATION RATE: 18 BRPM | SYSTOLIC BLOOD PRESSURE: 145 MMHG | BODY MASS INDEX: 40.09 KG/M2 | TEMPERATURE: 98.1 F | OXYGEN SATURATION: 100 %

## 2025-09-05 DIAGNOSIS — M48.061 SPINAL STENOSIS OF LUMBAR REGION WITHOUT NEUROGENIC CLAUDICATION: ICD-10-CM

## 2025-09-05 DIAGNOSIS — G89.29 CHRONIC BILATERAL LOW BACK PAIN WITHOUT SCIATICA: Primary | ICD-10-CM

## 2025-09-05 DIAGNOSIS — M54.50 CHRONIC BILATERAL LOW BACK PAIN WITHOUT SCIATICA: Primary | ICD-10-CM

## 2025-09-05 PROCEDURE — 72110 X-RAY EXAM L-2 SPINE 4/>VWS: CPT

## 2025-09-05 PROCEDURE — 6360000002 HC RX W HCPCS: Performed by: PHYSICIAN ASSISTANT

## 2025-09-05 RX ORDER — KETOROLAC TROMETHAMINE 30 MG/ML
30 INJECTION, SOLUTION INTRAMUSCULAR; INTRAVENOUS ONCE
Status: COMPLETED | OUTPATIENT
Start: 2025-09-05 | End: 2025-09-05

## 2025-09-05 RX ORDER — LIDOCAINE 50 MG/G
1 PATCH TOPICAL DAILY
Qty: 10 PATCH | Refills: 0 | Status: SHIPPED | OUTPATIENT
Start: 2025-09-05 | End: 2025-09-15

## 2025-09-05 RX ADMIN — KETOROLAC TROMETHAMINE 30 MG: 30 INJECTION, SOLUTION INTRAMUSCULAR; INTRAVENOUS at 14:19

## 2025-09-05 ASSESSMENT — PAIN DESCRIPTION - PAIN TYPE: TYPE: ACUTE PAIN;CHRONIC PAIN

## 2025-09-05 ASSESSMENT — PAIN - FUNCTIONAL ASSESSMENT: PAIN_FUNCTIONAL_ASSESSMENT: 0-10

## 2025-09-05 ASSESSMENT — PAIN SCALES - GENERAL: PAINLEVEL_OUTOF10: 9

## 2025-09-05 ASSESSMENT — PAIN DESCRIPTION - LOCATION: LOCATION: BACK

## 2025-09-05 ASSESSMENT — PAIN DESCRIPTION - DESCRIPTORS: DESCRIPTORS: ACHING

## 2025-09-05 ASSESSMENT — PAIN DESCRIPTION - ORIENTATION: ORIENTATION: LOWER

## (undated) DEVICE — ELECTRODE PT RET AD L9FT HI MOIST COND ADH HYDRGEL CORDED

## (undated) DEVICE — SET TBNG DISP TIP FOR AHTO

## (undated) DEVICE — STAPLER 60MM POWERED ECHELON 3000 LONG 440MM

## (undated) DEVICE — SEALANT TISS 10 CC FOR HUM FIBRIN VISTASEAL

## (undated) DEVICE — GLOVE SURG SZ 8 L12IN THK75MIL DK GRN LTX FREE

## (undated) DEVICE — SOLUTION IRRIG 500ML 0.9% SOD CHLO USP POUR PLAS BTL

## (undated) DEVICE — MINOR: Brand: MEDLINE INDUSTRIES, INC.

## (undated) DEVICE — SUTURE PDS II SZ 0 L27IN ABSRB VLT L26MM CT-2 1/2 CIR Z334H

## (undated) DEVICE — GARMENT COMPR M FOR 12-18IN CALF INTMIT SGL BLDR HEMO-FORCE

## (undated) DEVICE — SOLUTION SURG PREP 26 CC PURPREP

## (undated) DEVICE — TROCAR LAP L100MM DIA5MM BLDELSS W/ STBL SL ENDOPATH XCEL

## (undated) DEVICE — SOLUTION IV 1000 ML 0.9 NACL INJ USP EXCEL PLAS CONTAINER

## (undated) DEVICE — SUTURE VCRL SZ 3-0 L27IN ABSRB UD L26MM SH 1/2 CIR J416H

## (undated) DEVICE — Device

## (undated) DEVICE — STRIP SKIN CLSR W1XL5IN NYLON REINF CURAD STERIL

## (undated) DEVICE — APPLICATOR LAP 35 CM 2 RIGID VISTASEAL

## (undated) DEVICE — PAD,ABDOMINAL,5"X9",ST,LF,25/BX: Brand: MEDLINE INDUSTRIES, INC.

## (undated) DEVICE — SUTURE MONOCRYL + SZ 4-0 L27IN ABSRB UD L19MM PS-2 3/8 CIR MCP426H

## (undated) DEVICE — SYRINGE MED 10ML TRNSLUC BRL PLUNG BLK MRK POLYPR CTRL

## (undated) DEVICE — TAPE ADH CLTH SILK H2O REPELLENT CURAD

## (undated) DEVICE — PAD,SANITARY,11 IN,MAXI,N-STRL,IND WRAP: Brand: MEDLINE

## (undated) DEVICE — SHEET,DRAPE,70X100,STERILE: Brand: MEDLINE

## (undated) DEVICE — 3M(TM) MEDIPORE(TM) +PAD SOFT CLOTH ADHESIVE WOUND DRESSING 3569: Brand: 3M™ MEDIPORE™

## (undated) DEVICE — FORCEPS BX OVL CUP SERR DISP CAP L 240CM RAD JAW 4

## (undated) DEVICE — GLOVE SURG SZ 7 L12IN FNGR THK79MIL GRN LTX FREE

## (undated) DEVICE — APPLIER CLP L SHFT DIA12MM 20 ROT MULT LIGACLP

## (undated) DEVICE — SPONGE DISSECT PNUT SM 3/8IN -- 5/PK

## (undated) DEVICE — SLEEVE TRCR L100MM DIA5MM UNIV STBL FOR BLDELSS DIL TIP

## (undated) DEVICE — SHEAR HARMONIC VES SEAL 360 DEG SHFT L 45 MM DIA 5 MM JAW L

## (undated) DEVICE — SET TBNG CAP ENDOSCP PMP BK FLO VLV DISP ERBEFLO

## (undated) DEVICE — PREMIUM WET SKIN PREP TRAY: Brand: MEDLINE INDUSTRIES, INC.

## (undated) DEVICE — SUTURE NONABSORBABLE MONOFILAMENT 3-0 PS-1 18 IN BLK ETHILON 1663H

## (undated) DEVICE — KIT SUTURING DEVICE M-CLOSE

## (undated) DEVICE — 3M(TM) MEDIPORE(TM) +PAD SOFT CLOTH ADHESIVE WOUND DRESSING 3566: Brand: 3M™ MEDIPORE™

## (undated) DEVICE — SWAB CULT LIQ STUART AGR AERB MOD IN BRK SGL RAYON TIP PLAS 220099] BECTON DICKINSON MICRO]

## (undated) DEVICE — SWAB CULT SGL AMIES W/O CHAR FOR THRT VAG SKIN HRT CULTSWAB

## (undated) DEVICE — TROCAR ENDOSCP BLDELSS 12X100 MM W/ HNDL STBL SL OPT TIP

## (undated) DEVICE — GLOVE SURG SZ 65 THK91MIL LTX FREE SYN POLYISOPRENE

## (undated) DEVICE — GARMENT,MEDLINE,DVT,INT,CALF,MED, GEN2: Brand: MEDLINE

## (undated) DEVICE — GAUZE,PACKING STRIP,IODOFORM,1"X5YD,STRL: Brand: CURAD

## (undated) DEVICE — SYRINGE MED 10ML LUERLOCK TIP W/O SFTY DISP

## (undated) DEVICE — SUTURE ETHIBOND EXCL BR GRN TAPR PT 2-0 30 X563H X563H

## (undated) DEVICE — MASTISOL ADHESIVE LIQ 2/3ML

## (undated) DEVICE — TUBING SCTION CONN 1/4X12 RIB

## (undated) DEVICE — TUBE GASTROSTMY 36FR L107CM CLS ROUNDED TIP SM SIDE H

## (undated) DEVICE — RELOAD STPL H4.1X2MM DIA60MM THCK TISS GRN 6 ROW PWR GST B

## (undated) DEVICE — GLOVE ORANGE PI 7 1/2   MSG9075

## (undated) DEVICE — SPONGE GZ W4XL4IN COT 12 PLY TYP VII WVN C FLD DSGN STERILE

## (undated) DEVICE — RELOAD STPL L60MM H1.5-3.6MM REG TISS BLU GRIPPING SURF B

## (undated) DEVICE — SHEET REPROC POS 40X80IN AIR BLEND CMFRT GLDE

## (undated) DEVICE — TROCAR ENDOSCP L100MM DIA12MM STBL SL BLDELSS ENDOPATH XCEL